# Patient Record
Sex: MALE | Race: WHITE
[De-identification: names, ages, dates, MRNs, and addresses within clinical notes are randomized per-mention and may not be internally consistent; named-entity substitution may affect disease eponyms.]

---

## 2017-03-03 ENCOUNTER — HOSPITAL ENCOUNTER (OUTPATIENT)
Dept: HOSPITAL 79 - KOH-I | Age: 67
End: 2017-03-03
Attending: PODIATRIST
Payer: MEDICARE

## 2017-03-03 DIAGNOSIS — M79.671: Primary | ICD-10-CM

## 2017-04-26 ENCOUNTER — OFFICE VISIT (OUTPATIENT)
Dept: UROLOGY | Facility: CLINIC | Age: 67
End: 2017-04-26

## 2017-04-26 DIAGNOSIS — N13.8 BPH (BENIGN PROSTATIC HYPERTROPHY) WITH URINARY OBSTRUCTION: ICD-10-CM

## 2017-04-26 DIAGNOSIS — E29.1 HYPOGONADISM IN MALE: Primary | ICD-10-CM

## 2017-04-26 DIAGNOSIS — N40.1 BPH (BENIGN PROSTATIC HYPERTROPHY) WITH URINARY OBSTRUCTION: ICD-10-CM

## 2017-04-26 LAB
BASOPHILS # BLD AUTO: 0.02 10*3/MM3 (ref 0–0.3)
BASOPHILS NFR BLD AUTO: 0.3 % (ref 0–2)
DEPRECATED RDW RBC AUTO: 41.7 FL (ref 37–54)
EOSINOPHIL # BLD AUTO: 0.22 10*3/MM3 (ref 0–0.7)
EOSINOPHIL NFR BLD AUTO: 3.1 % (ref 0–7)
ERYTHROCYTE [DISTWIDTH] IN BLOOD BY AUTOMATED COUNT: 13.1 % (ref 11.5–14.5)
HCT VFR BLD AUTO: 43.7 % (ref 42–52)
HGB BLD-MCNC: 14.6 G/DL (ref 14–18)
IMM GRANULOCYTES # BLD: 0.07 10*3/MM3 (ref 0–0.03)
IMM GRANULOCYTES NFR BLD: 1 % (ref 0–0.5)
LYMPHOCYTES # BLD AUTO: 2.75 10*3/MM3 (ref 1–3)
LYMPHOCYTES NFR BLD AUTO: 39.1 % (ref 16–46)
MCH RBC QN AUTO: 29.4 PG (ref 27–33)
MCHC RBC AUTO-ENTMCNC: 33.4 G/DL (ref 33–37)
MCV RBC AUTO: 87.9 FL (ref 80–94)
MONOCYTES # BLD AUTO: 0.83 10*3/MM3 (ref 0.1–0.9)
MONOCYTES NFR BLD AUTO: 11.8 % (ref 0–12)
NEUTROPHILS # BLD AUTO: 3.15 10*3/MM3 (ref 1.4–6.5)
NEUTROPHILS NFR BLD AUTO: 44.7 % (ref 40–75)
PLATELET # BLD AUTO: 194 10*3/MM3 (ref 130–400)
PMV BLD AUTO: 10.5 FL (ref 6–10)
PSA SERPL-MCNC: 1.38 NG/ML (ref 0–4)
RBC # BLD AUTO: 4.97 10*6/MM3 (ref 4.7–6.1)
TESTOST SERPL-MCNC: 333.35 NG/DL (ref 86.98–780.1)
WBC NRBC COR # BLD: 7.04 10*3/MM3 (ref 4.5–12.5)

## 2017-04-26 PROCEDURE — 36415 COLL VENOUS BLD VENIPUNCTURE: CPT | Performed by: UROLOGY

## 2017-04-26 PROCEDURE — 85025 COMPLETE CBC W/AUTO DIFF WBC: CPT | Performed by: UROLOGY

## 2017-04-26 PROCEDURE — 84153 ASSAY OF PSA TOTAL: CPT | Performed by: UROLOGY

## 2017-04-26 PROCEDURE — 96372 THER/PROPH/DIAG INJ SC/IM: CPT | Performed by: UROLOGY

## 2017-04-26 PROCEDURE — 84403 ASSAY OF TOTAL TESTOSTERONE: CPT | Performed by: UROLOGY

## 2017-04-26 PROCEDURE — 99203 OFFICE O/P NEW LOW 30 MIN: CPT | Performed by: UROLOGY

## 2017-04-26 RX ORDER — LISINOPRIL 40 MG/1
40 TABLET ORAL EVERY MORNING
COMMUNITY
Start: 2017-04-12 | End: 2022-02-11

## 2017-04-26 RX ORDER — TESTOSTERONE CYPIONATE 200 MG/ML
400 INJECTION, SOLUTION INTRAMUSCULAR ONCE
Status: COMPLETED | OUTPATIENT
Start: 2017-04-26 | End: 2017-04-26

## 2017-04-26 RX ORDER — GLIMEPIRIDE 4 MG/1
4 TABLET ORAL
COMMUNITY
Start: 2017-04-12

## 2017-04-26 RX ORDER — TIMOLOL MALEATE 5 MG/ML
1 SOLUTION/ DROPS OPHTHALMIC 2 TIMES DAILY
COMMUNITY
Start: 2017-04-08

## 2017-04-26 RX ADMIN — TESTOSTERONE CYPIONATE 400 MG: 200 INJECTION, SOLUTION INTRAMUSCULAR at 14:27

## 2017-04-26 NOTE — PROGRESS NOTES
Chief Complaint:          Chief Complaint   Patient presents with   • Erectile Dysfunction       HPI:   67 y.o. male.    67-year-old  white male to a much younger woman of 41 with significant erectile dysfunction for 2 years he's able to get a 50% erection and it maulik there is no been.  He has low libido and he feels as if his penis is drawn up inside him.  He is also signs and symptoms of hypogonadism.  I'm going to initiate a workup begun testosterone today after appropriate discussion of the risks and benefits and start him on oral sildenafil.This pleasant male patient presents today with signs and symptoms that are consistent with low testosterone he has positive Petr questionnaire by history.  He has a discussion of the various forms testosterone available including parenteral, topical, and the form of a patch.  We discussed the efficacy of the gels, and the injections.  As well as the cost and benefits analysis.  We discussed the the studies a talked about heart disease and its effect on prostate cancer both of which are negligible.  He gives verbal consent to proceed with treatment.  He understands the risks and benefits of length he also completed his attempts at fertility he understands the partial effect on spermatogenesis.ED-we discussed the anatomy and physiology of the penis and the endothelium.  We discussed the various treatment options available including oral medication and its various forms.  Penile injections, vacuum erection device and surgical intervention reserved for only the most severe cases.  We discussed the need for testosterone and about 20% of cases of erectile dysfunction      Past Medical History:        Past Medical History:   Diagnosis Date   • Diabetes mellitus    • Hypertension          Current Meds:     Current Outpatient Prescriptions   Medication Sig Dispense Refill   • glimepiride (AMARYL) 4 MG tablet      • lisinopril (PRINIVIL,ZESTRIL) 40 MG tablet      • metFORMIN  (GLUCOPHAGE) 500 MG tablet      • metFORMIN (GLUCOPHAGE) 850 MG tablet      • timolol (TIMOPTIC) 0.5 % ophthalmic solution        No current facility-administered medications for this visit.         Allergies:      No Known Allergies     Past Surgical History:     Past Surgical History:   Procedure Laterality Date   • CHOLECYSTECTOMY     • MOUTH SURGERY           Social History:     Social History     Social History   • Marital status:      Spouse name: N/A   • Number of children: N/A   • Years of education: N/A     Occupational History   • Not on file.     Social History Main Topics   • Smoking status: Former Smoker   • Smokeless tobacco: Not on file   • Alcohol use No   • Drug use: No   • Sexual activity: Not on file     Other Topics Concern   • Not on file     Social History Narrative   • No narrative on file       Family History:     Family History   Problem Relation Age of Onset   • No Known Problems Father    • No Known Problems Mother        Review of Systems:     Review of Systems   Constitutional: Negative.    HENT: Negative.    Eyes: Negative.    Respiratory: Negative.    Cardiovascular: Negative.    Gastrointestinal: Negative.    Endocrine: Negative.    Genitourinary: Negative.    Musculoskeletal: Negative.    Allergic/Immunologic: Negative.    Neurological: Negative.    Hematological: Negative.    Psychiatric/Behavioral: Negative.        Physical Exam:     Physical Exam   Constitutional: He is oriented to person, place, and time. He appears well-developed and well-nourished.   HENT:   Head: Normocephalic and atraumatic.   Eyes: Conjunctivae and EOM are normal. Pupils are equal, round, and reactive to light.   Neck: Normal range of motion.   Cardiovascular: Normal rate, regular rhythm, normal heart sounds and intact distal pulses.    Pulmonary/Chest: Effort normal and breath sounds normal.   Abdominal: Soft. Bowel sounds are normal.   Genitourinary: Rectum normal, prostate normal and penis normal.    Musculoskeletal: Normal range of motion.   Neurological: He is alert and oriented to person, place, and time. He has normal reflexes.   Skin: Skin is warm and dry.   Psychiatric: He has a normal mood and affect. His behavior is normal. Judgment and thought content normal.   Nursing note and vitals reviewed.      Procedure:       Assessment:   No diagnosis found.  No orders of the defined types were placed in this encounter.      Plan:   Erectile dysfunction-treatment therapy is an urgent need,ED-we discussed the anatomy and physiology of the penis and the endothelium.  We discussed the various treatment options available including oral medication and its various forms.  Penile injections, vacuum erection device and surgical intervention reserved for only the most severe cases.  We discussed the need for testosterone and about 20% of cases of erectile dysfunction.This pleasant male patient presents today with signs and symptoms that are consistent with low testosterone he has positive Petr questionnaire by history.  He has a discussion of the various forms testosterone available including parenteral, topical, and the form of a patch.  We discussed the efficacy of the gels, and the injections.  As well as the cost and benefits analysis.  We discussed the the studies a talked about heart disease and its effect on prostate cancer both of which are negligible.  He gives verbal consent to proceed with treatment.  He understands the risks and benefits of length he also completed his attempts at fertility he understands the partial effect on spermatogenesis           This document has been electronically signed by VIRAJ BAUTISTA MD April 26, 2017 1:58 PM

## 2017-10-19 ENCOUNTER — OFFICE VISIT (OUTPATIENT)
Dept: UROLOGY | Facility: CLINIC | Age: 67
End: 2017-10-19

## 2017-10-19 VITALS
DIASTOLIC BLOOD PRESSURE: 81 MMHG | BODY MASS INDEX: 30.8 KG/M2 | HEIGHT: 71 IN | WEIGHT: 220 LBS | HEART RATE: 68 BPM | SYSTOLIC BLOOD PRESSURE: 138 MMHG

## 2017-10-19 DIAGNOSIS — R79.89 LOW TESTOSTERONE IN MALE: Primary | ICD-10-CM

## 2017-10-19 DIAGNOSIS — N52.02 CORPORO-VENOUS OCCLUSIVE ERECTILE DYSFUNCTION: ICD-10-CM

## 2017-10-19 DIAGNOSIS — E29.1 HYPOGONADISM IN MALE: ICD-10-CM

## 2017-10-19 PROCEDURE — 96372 THER/PROPH/DIAG INJ SC/IM: CPT | Performed by: UROLOGY

## 2017-10-19 PROCEDURE — 99213 OFFICE O/P EST LOW 20 MIN: CPT | Performed by: UROLOGY

## 2017-10-19 RX ORDER — SILDENAFIL CITRATE 20 MG/1
TABLET ORAL
Qty: 24 TABLET | Refills: 11 | Status: SHIPPED | OUTPATIENT
Start: 2017-10-19

## 2017-10-19 RX ORDER — TESTOSTERONE CYPIONATE 200 MG/ML
100 INJECTION, SOLUTION INTRAMUSCULAR ONCE
Status: COMPLETED | OUTPATIENT
Start: 2017-10-19 | End: 2017-10-19

## 2017-10-19 RX ORDER — TESTOSTERONE CYPIONATE 200 MG/ML
INJECTION, SOLUTION INTRAMUSCULAR
Qty: 10 ML | Refills: 2 | Status: SHIPPED | OUTPATIENT
Start: 2017-10-19 | End: 2018-05-04 | Stop reason: HOSPADM

## 2017-10-19 RX ADMIN — TESTOSTERONE CYPIONATE 100 MG: 200 INJECTION, SOLUTION INTRAMUSCULAR at 09:32

## 2017-10-19 NOTE — PROGRESS NOTES
Chief Complaint:          Chief Complaint   Patient presents with   • Erectile Dysfunction       HPI:   67 y.o. male.  67-year-old  white male to a much younger woman of 41 with significant erectile dysfunction for 2 years he's able to get a 50% erection and it maulik there is no been.  He has low libido and he feels as if his penis is drawn up inside him.  He is also signs and symptoms of hypogonadism.  I'm going to initiate a workup begun testosterone today after appropriate discussion of the risks and benefits and start him on oral sildenafil.This pleasant male patient presents today with signs and symptoms that are consistent with low testosterone he has positive Petr questionnaire by history.  He has a discussion of the various forms testosterone available including parenteral, topical, and the form of a patch.  We discussed the efficacy of the gels, and the injections.  As well as the cost and benefits analysis.  We discussed the the studies a talked about heart disease and its effect on prostate cancer both of which are negligible.  He gives verbal consent to proceed with treatment.  He understands the risks and benefits of length he also completed his attempts at fertility he understands the partial effect on spermatogenesis.ED-we discussed the anatomy and physiology of the penis and the endothelium.  We discussed the various treatment options available including oral medication and its various forms.  Penile injections, vacuum erection device and surgical intervention reserved for only the most severe cases.  We discussed the need for testosterone and about 20% of cases of erectile dysfunction      He returns today his penis is still not rock hard.  His PSA is 1.38 with a testosterone of 333 and a hemoglobin of 14.6 I'm undergo ahead make the addition of sildenafil.  I discussed this with him at length he also discussed penile injections and a vacuum erection device to the age of his wife organ to  recommend the going to testosterone twice a week and adding the Viagra. Erectile dysfunction-we discussed the anatomy and physiology of the penis and the endothelium.  We discussed the various forms of erectile dysfunction including peripheral venous occlusive disease, postoperative, secondary to radiation treatments of the prostate, and arterial inflow.  We discussed the various treatment options available including oral medication and its various forms.  We discussed the use of both generic and non-generic Viagra.  We discussed Cialis and a longer half-life of 17 hours as well as the other 2 medications.  We discussed cost involved with this including the fact that the generic is much cheaper but is taken has multiple pills because they are 20 mg dosages.  We did discuss the other alternatives including Penile injections, vacuum erection devices and surgical intervention reserved for only the most severe cases.  We discussed the need for testosterone in about 20% of cases of erectile dysfunction.        Past Medical History:        Past Medical History:   Diagnosis Date   • Diabetes mellitus    • Hypertension          Current Meds:     Current Outpatient Prescriptions   Medication Sig Dispense Refill   • glimepiride (AMARYL) 4 MG tablet      • lisinopril (PRINIVIL,ZESTRIL) 40 MG tablet      • metFORMIN (GLUCOPHAGE) 500 MG tablet      • metFORMIN (GLUCOPHAGE) 850 MG tablet      • timolol (TIMOPTIC) 0.5 % ophthalmic solution        No current facility-administered medications for this visit.         Allergies:      No Known Allergies     Past Surgical History:     Past Surgical History:   Procedure Laterality Date   • CHOLECYSTECTOMY     • MOUTH SURGERY           Social History:     Social History     Social History   • Marital status:      Spouse name: N/A   • Number of children: N/A   • Years of education: N/A     Occupational History   • Not on file.     Social History Main Topics   • Smoking status:  Former Smoker   • Smokeless tobacco: Not on file   • Alcohol use No   • Drug use: No   • Sexual activity: Not on file     Other Topics Concern   • Not on file     Social History Narrative       Family History:     Family History   Problem Relation Age of Onset   • No Known Problems Father    • No Known Problems Mother        Review of Systems:     Review of Systems   Constitutional: Negative.    HENT: Negative.    Eyes: Negative.    Respiratory: Negative.    Cardiovascular: Negative.    Gastrointestinal: Negative.    Endocrine: Negative.    Musculoskeletal: Negative.    Allergic/Immunologic: Negative.    Neurological: Negative.    Hematological: Negative.    Psychiatric/Behavioral: Negative.        Physical Exam:     Physical Exam   Constitutional: He is oriented to person, place, and time. He appears well-developed and well-nourished.   HENT:   Head: Normocephalic and atraumatic.   Eyes: Conjunctivae and EOM are normal. Pupils are equal, round, and reactive to light.   Neck: Normal range of motion.   Cardiovascular: Normal rate, regular rhythm, normal heart sounds and intact distal pulses.    Pulmonary/Chest: Effort normal and breath sounds normal.   Abdominal: Soft. Bowel sounds are normal.   Genitourinary: Rectum normal, prostate normal and penis normal.   Musculoskeletal: Normal range of motion.   Neurological: He is alert and oriented to person, place, and time. He has normal reflexes.   Skin: Skin is warm and dry.   Psychiatric: He has a normal mood and affect. His behavior is normal. Judgment and thought content normal.   Nursing note and vitals reviewed.      Procedure:       Assessment:   No diagnosis found.  No orders of the defined types were placed in this encounter.      Plan:   -Low testosterone: patient is here for follow-up.  Since beginning the medication he's been very pleased.  He reports a dramatic improvement in his erections, ability to achieve and maintain an erection, improvement in libido,  increase in frequency of morning erections, a noticeable weight loss consistent with the treatment.  No development of breast problems or abnormalities.  He's going to have appropriate safety laboratory parameters checked.   He understands that the new data implicates testosterone with the development of prostate cancer and this is all but been disproven and the medical literature as well as the risks of cardiovascular disease which is actually also been disproven.  He understands that while he is a candidate for topical therapy if he is in contact with children this is not an option because it's been shown to accentuate genitalia development at an early age that this frequently irreversible.  He also understands this is a controlled substance and as such will not be prescribed without appropriate follow-up and appropriate laboratory investigation.  He understands effects on spermatogenesis including the fact that this is not always completely reversible and not always completely limited his ability to father a child.  He has demonstrated facility in the technique of both intramuscular and subcutaneous injection.  And has been taught sterility one drawing up the medication.  Erectile dysfunction-we discussed the anatomy and physiology of the penis and the endothelium.  We discussed the various forms of erectile dysfunction including peripheral venous occlusive disease, postoperative, secondary to radiation treatments of the prostate, and arterial inflow.  We discussed the various treatment options available including oral medication and its various forms.  We discussed the use of both generic and non-generic Viagra.  We discussed Cialis and a longer half-life of 17 hours as well as the other 2 medications.  We discussed cost involved with this including the fact that the generic is much cheaper but is taken has multiple pills because they are 20 mg dosages.  We did discuss the other alternatives including Penile  injections, vacuum erection devices and surgical intervention reserved for only the most severe cases.  We discussed the need for testosterone in about 20% of cases of erectile dysfunction.           This document has been electronically signed by VIRAJ BAUTISTA MD October 19, 2017 8:41 AM

## 2017-10-20 PROBLEM — N52.02 CORPORO-VENOUS OCCLUSIVE ERECTILE DYSFUNCTION: Status: ACTIVE | Noted: 2017-10-20

## 2018-03-01 ENCOUNTER — TRANSCRIBE ORDERS (OUTPATIENT)
Dept: ADMINISTRATIVE | Facility: HOSPITAL | Age: 68
End: 2018-03-01

## 2018-03-01 DIAGNOSIS — I70.0 CALCIFICATION OF AORTA (HCC): Primary | ICD-10-CM

## 2018-03-07 ENCOUNTER — TRANSCRIBE ORDERS (OUTPATIENT)
Dept: ADMINISTRATIVE | Facility: HOSPITAL | Age: 68
End: 2018-03-07

## 2018-03-07 ENCOUNTER — APPOINTMENT (OUTPATIENT)
Dept: LAB | Facility: HOSPITAL | Age: 68
End: 2018-03-07
Attending: INTERNAL MEDICINE

## 2018-03-07 DIAGNOSIS — Z78.9 AMERICAN DIABETES ASSOCIATION (ADA) 1100 CALORIE DIET: Primary | ICD-10-CM

## 2018-03-07 LAB
BUN BLD-MCNC: 16 MG/DL (ref 7–21)
CREAT BLD-MCNC: 1.02 MG/DL (ref 0.43–1.29)
GFR SERPL CREATININE-BSD FRML MDRD: 73 ML/MIN/1.73

## 2018-03-07 PROCEDURE — 82565 ASSAY OF CREATININE: CPT | Performed by: INTERNAL MEDICINE

## 2018-03-07 PROCEDURE — 84520 ASSAY OF UREA NITROGEN: CPT | Performed by: INTERNAL MEDICINE

## 2018-03-07 PROCEDURE — 36415 COLL VENOUS BLD VENIPUNCTURE: CPT | Performed by: INTERNAL MEDICINE

## 2018-03-08 ENCOUNTER — HOSPITAL ENCOUNTER (OUTPATIENT)
Dept: CT IMAGING | Facility: HOSPITAL | Age: 68
Discharge: HOME | End: 2018-03-08
Attending: INTERNAL MEDICINE | Admitting: INTERNAL MEDICINE

## 2018-03-08 DIAGNOSIS — I70.0 CALCIFICATION OF AORTA (HCC): ICD-10-CM

## 2018-03-08 PROCEDURE — 71275 CT ANGIOGRAPHY CHEST: CPT | Performed by: RADIOLOGY

## 2018-03-08 PROCEDURE — 71275 CT ANGIOGRAPHY CHEST: CPT

## 2018-03-08 PROCEDURE — 0 IOPAMIDOL 61 % SOLUTION: Performed by: INTERNAL MEDICINE

## 2018-03-08 RX ADMIN — IOPAMIDOL 100 ML: 612 INJECTION, SOLUTION INTRAVENOUS at 10:30

## 2018-05-01 DIAGNOSIS — R94.39 ABNORMAL CARDIOVASCULAR STRESS TEST: Primary | ICD-10-CM

## 2018-05-01 NOTE — PROGRESS NOTES
Per telephone call with Dr. Cecy Gorman pt needs cath. Diabetic with atypical symptoms however stress was positive at 2 minutes with ST depression (2 mm). Call [phone] to schedule for Wednesday or Friday

## 2018-05-03 ENCOUNTER — PREP FOR SURGERY (OUTPATIENT)
Dept: OTHER | Facility: HOSPITAL | Age: 68
End: 2018-05-03

## 2018-05-03 RX ORDER — CLOPIDOGREL BISULFATE 75 MG/1
75 TABLET ORAL DAILY
Status: CANCELLED | OUTPATIENT
Start: 2018-05-04

## 2018-05-03 RX ORDER — SODIUM CHLORIDE 0.9 % (FLUSH) 0.9 %
1-10 SYRINGE (ML) INJECTION AS NEEDED
Status: CANCELLED | OUTPATIENT
Start: 2018-05-03

## 2018-05-03 RX ORDER — CLOPIDOGREL BISULFATE 75 MG/1
600 TABLET ORAL ONCE
Status: CANCELLED | OUTPATIENT
Start: 2018-05-03 | End: 2018-05-03

## 2018-05-03 RX ORDER — ACETAMINOPHEN 325 MG/1
650 TABLET ORAL EVERY 4 HOURS PRN
Status: CANCELLED | OUTPATIENT
Start: 2018-05-03

## 2018-05-03 RX ORDER — NITROGLYCERIN 0.4 MG/1
0.4 TABLET SUBLINGUAL
Status: CANCELLED | OUTPATIENT
Start: 2018-05-03

## 2018-05-04 ENCOUNTER — APPOINTMENT (OUTPATIENT)
Dept: GENERAL RADIOLOGY | Facility: HOSPITAL | Age: 68
End: 2018-05-04

## 2018-05-04 ENCOUNTER — HOSPITAL ENCOUNTER (OUTPATIENT)
Facility: HOSPITAL | Age: 68
Discharge: HOME | End: 2018-05-04
Attending: INTERNAL MEDICINE | Admitting: INTERNAL MEDICINE

## 2018-05-04 VITALS
WEIGHT: 214.73 LBS | RESPIRATION RATE: 18 BRPM | HEART RATE: 60 BPM | DIASTOLIC BLOOD PRESSURE: 75 MMHG | HEIGHT: 71 IN | BODY MASS INDEX: 30.06 KG/M2 | SYSTOLIC BLOOD PRESSURE: 124 MMHG | OXYGEN SATURATION: 97 %

## 2018-05-04 DIAGNOSIS — R94.39 ABNORMAL CARDIOVASCULAR STRESS TEST: ICD-10-CM

## 2018-05-04 LAB
ALBUMIN SERPL-MCNC: 4.1 G/DL (ref 3.2–4.8)
ALBUMIN/GLOB SERPL: 1.4 G/DL (ref 1.5–2.5)
ALP SERPL-CCNC: 73 U/L (ref 25–100)
ALT SERPL W P-5'-P-CCNC: 28 U/L (ref 7–40)
ANION GAP SERPL CALCULATED.3IONS-SCNC: 6 MMOL/L (ref 3–11)
ARTICHOKE IGE QN: 87 MG/DL (ref 0–130)
AST SERPL-CCNC: 22 U/L (ref 0–33)
BILIRUB SERPL-MCNC: 0.7 MG/DL (ref 0.3–1.2)
BUN BLD-MCNC: 16 MG/DL (ref 9–23)
BUN/CREAT SERPL: 16 (ref 7–25)
CALCIUM SPEC-SCNC: 9.2 MG/DL (ref 8.7–10.4)
CHLORIDE SERPL-SCNC: 106 MMOL/L (ref 99–109)
CHOLEST SERPL-MCNC: 126 MG/DL (ref 0–200)
CO2 SERPL-SCNC: 25 MMOL/L (ref 20–31)
CREAT BLD-MCNC: 1 MG/DL (ref 0.6–1.3)
DEPRECATED RDW RBC AUTO: 40.1 FL (ref 37–54)
ERYTHROCYTE [DISTWIDTH] IN BLOOD BY AUTOMATED COUNT: 12.6 % (ref 11.3–14.5)
GFR SERPL CREATININE-BSD FRML MDRD: 75 ML/MIN/1.73
GLOBULIN UR ELPH-MCNC: 2.9 GM/DL
GLUCOSE BLD-MCNC: 150 MG/DL (ref 70–100)
GLUCOSE BLDC GLUCOMTR-MCNC: 114 MG/DL (ref 70–130)
HBA1C MFR BLD: 8.4 % (ref 4.8–5.6)
HCT VFR BLD AUTO: 41.6 % (ref 38.9–50.9)
HDLC SERPL-MCNC: 29 MG/DL (ref 40–60)
HGB BLD-MCNC: 14.2 G/DL (ref 13.1–17.5)
MCH RBC QN AUTO: 29.6 PG (ref 27–31)
MCHC RBC AUTO-ENTMCNC: 34.1 G/DL (ref 32–36)
MCV RBC AUTO: 86.7 FL (ref 80–99)
PLATELET # BLD AUTO: 174 10*3/MM3 (ref 150–450)
PMV BLD AUTO: 9.7 FL (ref 6–12)
POTASSIUM BLD-SCNC: 4.3 MMOL/L (ref 3.5–5.5)
PROT SERPL-MCNC: 7 G/DL (ref 5.7–8.2)
RBC # BLD AUTO: 4.8 10*6/MM3 (ref 4.2–5.76)
SODIUM BLD-SCNC: 137 MMOL/L (ref 132–146)
TRIGL SERPL-MCNC: 147 MG/DL (ref 0–150)
WBC NRBC COR # BLD: 6.86 10*3/MM3 (ref 3.5–10.8)

## 2018-05-04 PROCEDURE — S0260 H&P FOR SURGERY: HCPCS | Performed by: INTERNAL MEDICINE

## 2018-05-04 PROCEDURE — 25010000002 MIDAZOLAM PER 1 MG: Performed by: INTERNAL MEDICINE

## 2018-05-04 PROCEDURE — 36415 COLL VENOUS BLD VENIPUNCTURE: CPT

## 2018-05-04 PROCEDURE — 0 IOPAMIDOL PER 1 ML: Performed by: INTERNAL MEDICINE

## 2018-05-04 PROCEDURE — C1769 GUIDE WIRE: HCPCS | Performed by: INTERNAL MEDICINE

## 2018-05-04 PROCEDURE — 83036 HEMOGLOBIN GLYCOSYLATED A1C: CPT | Performed by: PHYSICIAN ASSISTANT

## 2018-05-04 PROCEDURE — C1894 INTRO/SHEATH, NON-LASER: HCPCS | Performed by: INTERNAL MEDICINE

## 2018-05-04 PROCEDURE — G0108 DIAB MANAGE TRN  PER INDIV: HCPCS | Performed by: REGISTERED NURSE

## 2018-05-04 PROCEDURE — 80053 COMPREHEN METABOLIC PANEL: CPT | Performed by: PHYSICIAN ASSISTANT

## 2018-05-04 PROCEDURE — 85027 COMPLETE CBC AUTOMATED: CPT | Performed by: PHYSICIAN ASSISTANT

## 2018-05-04 PROCEDURE — 25010000002 FENTANYL CITRATE (PF) 100 MCG/2ML SOLUTION: Performed by: INTERNAL MEDICINE

## 2018-05-04 PROCEDURE — 93005 ELECTROCARDIOGRAM TRACING: CPT | Performed by: PHYSICIAN ASSISTANT

## 2018-05-04 PROCEDURE — 82962 GLUCOSE BLOOD TEST: CPT

## 2018-05-04 PROCEDURE — 93458 L HRT ARTERY/VENTRICLE ANGIO: CPT | Performed by: INTERNAL MEDICINE

## 2018-05-04 PROCEDURE — C1760 CLOSURE DEV, VASC: HCPCS | Performed by: INTERNAL MEDICINE

## 2018-05-04 PROCEDURE — 71046 X-RAY EXAM CHEST 2 VIEWS: CPT

## 2018-05-04 PROCEDURE — 80061 LIPID PANEL: CPT | Performed by: PHYSICIAN ASSISTANT

## 2018-05-04 RX ORDER — MIDAZOLAM HYDROCHLORIDE 1 MG/ML
INJECTION INTRAMUSCULAR; INTRAVENOUS AS NEEDED
Status: DISCONTINUED | OUTPATIENT
Start: 2018-05-04 | End: 2018-05-04 | Stop reason: HOSPADM

## 2018-05-04 RX ORDER — ASPIRIN 81 MG/1
81 TABLET ORAL EVERY MORNING
COMMUNITY

## 2018-05-04 RX ORDER — NITROGLYCERIN 0.4 MG/1
0.4 TABLET SUBLINGUAL
Status: DISCONTINUED | OUTPATIENT
Start: 2018-05-04 | End: 2018-05-04 | Stop reason: HOSPADM

## 2018-05-04 RX ORDER — LIDOCAINE HYDROCHLORIDE 10 MG/ML
INJECTION, SOLUTION EPIDURAL; INFILTRATION; INTRACAUDAL; PERINEURAL AS NEEDED
Status: DISCONTINUED | OUTPATIENT
Start: 2018-05-04 | End: 2018-05-04 | Stop reason: HOSPADM

## 2018-05-04 RX ORDER — CLOPIDOGREL BISULFATE 75 MG/1
75 TABLET ORAL DAILY
Status: DISCONTINUED | OUTPATIENT
Start: 2018-05-05 | End: 2018-05-04 | Stop reason: HOSPADM

## 2018-05-04 RX ORDER — ACETAMINOPHEN 325 MG/1
650 TABLET ORAL EVERY 4 HOURS PRN
Status: DISCONTINUED | OUTPATIENT
Start: 2018-05-04 | End: 2018-05-04 | Stop reason: HOSPADM

## 2018-05-04 RX ORDER — FENTANYL CITRATE 50 UG/ML
INJECTION, SOLUTION INTRAMUSCULAR; INTRAVENOUS AS NEEDED
Status: DISCONTINUED | OUTPATIENT
Start: 2018-05-04 | End: 2018-05-04 | Stop reason: HOSPADM

## 2018-05-04 RX ORDER — ASPIRIN 81 MG/1
324 TABLET, CHEWABLE ORAL ONCE
Status: COMPLETED | OUTPATIENT
Start: 2018-05-04 | End: 2018-05-04

## 2018-05-04 RX ORDER — ATORVASTATIN CALCIUM 10 MG/1
10 TABLET, FILM COATED ORAL NIGHTLY
COMMUNITY

## 2018-05-04 RX ORDER — SODIUM CHLORIDE 0.9 % (FLUSH) 0.9 %
1-10 SYRINGE (ML) INJECTION AS NEEDED
Status: DISCONTINUED | OUTPATIENT
Start: 2018-05-04 | End: 2018-05-04 | Stop reason: HOSPADM

## 2018-05-04 RX ORDER — CLOPIDOGREL BISULFATE 75 MG/1
600 TABLET ORAL ONCE
Status: COMPLETED | OUTPATIENT
Start: 2018-05-04 | End: 2018-05-04

## 2018-05-04 RX ADMIN — ASPIRIN 81 MG 324 MG: 81 TABLET ORAL at 11:15

## 2018-05-04 RX ADMIN — CLOPIDOGREL BISULFATE 600 MG: 75 TABLET ORAL at 11:15

## 2018-05-04 NOTE — CONSULTS
Patient was seen for  diagnosis of type 2 diabetes. Discussed and taught patient about type 2 diabetes self-management, risk factors, and importance of blood glucose control to reduce complications. Target blood glucose readings and A1c goals per ADA were reviewed. Reviewed with patient current A1c, of 8.4% and discussed its significance. Signs, symptoms and treatment of hyperglycemia and hypoglycemia were discussed. Lifestyle changes such as physical activity with MD approval and healthy eating were encouraged.

## 2018-05-04 NOTE — H&P
Pre-cardiac Catheterization History and Physical  [facility] at [facility]      Patient:  Hector Johnston Jr.  :  1950  MRN: [id number]    PCP:  EDMOND Sarmiento  PHONE:  [phone]    DATE: 2018  ID: Hector Johnston Jr. is a 68 y.o. male resident of [place], [place]     CC: abnormal stress test    PROBLEM LIST:   1. Coronary artery disease  A. CTA Chest 3/2018: coronary artery calcification   B. GXT stress only 2018: positive for stress induced ischemia, with 1mm horizontal ST depression in lateral leads with stress  C.Stopped GXT at TWO MINUTES due to extreme fatigue.  C. Echo 3/12/2018: Normal LVSF, mild LVH, mild AI   2. Hypertension  3. Hyperlipidemia  4. DM2  5. Obesity  6. History of Bell's Palsy  7. Surgical history:   A. Bilateral cataracts   B. Cholecystectomy   C. Dental surgery     BRIEF HPI: Mr. Johnston is a pleasant 68 y.o obese WM with history of diabetes, HTN and HLD who presents for cardiac catheterization today. He underwent a CTA of his chest earlier this year and was noted to have coronary calcification on this test. He subsequently had a GXT stress only with Dr. Gorman which was positive for ischemia however patient was asymptomatic. He denies angina, exertional dyspnea, palpitations or heart failure symptoms. He denies syncope. No history of MI, CVA, DVT/PE or rheumatic fever. He was just started on Lipitor following his stress test results. His A1C has been as high as 9-10% in the past. He works in a warehouse and denies tobacco, alcohol or drug use.     Cardiac Risk Factors: advanced age (older than 55 for men, 65 for women), diabetes mellitus, dyslipidemia, hypertension, male gender and obesity (BMI >= 30 kg/m2)    Allergies:      No Known Allergies    MEDICATIONS:  · ASA 81mg daily  · Atorvastatin 10mg nightly  · Lisinopril 40mg daily  · Metformin 850mg BID  Glimepiride 4mg daily  · Sildenafil PRN  · Timolol eye drops     Past medical & surgical  "history, social and family history reviewed in the electronic medical record.    ROS: Pertinent positives listed in the HPI and problem list above. All others reviewed and negative.     Physical Exam:   /83 (BP Location: Left arm, Patient Position: Lying)   Pulse 73   Resp 16   Ht 180.3 cm (71\")   Wt 97.4 kg (214 lb 11.7 oz)   SpO2 96%   BMI 29.95 kg/m²     Constitutional:    Alert, cooperative, in no acute distress   Neck:     No Jugular venous distention, adenopathy, or thyromegaly noted.     Heart:    Regular rhythm and normal rate, normal S1 and S2, no murmurs,gallops, rubs, or clicks. No distinct PMI noted.    Lungs:     Clear to auscultation bilaterally, respirations regular, even     and unlabored    Abdomen:     Soft non-tender, non-distended, normal bowel sounds, no masses or organomegaly   Extremities:   No gross deformities, no edema, clubbing, or cyanosis.    Pulses:   Peripheral pulses palpable and equal bilaterally.     Barbaeu Test:  Left: Normal  (oxymetric Allens) Right: Not Assessed     Labs and Diagnostic Data:    Results from last 7 days  Lab Units 05/04/18  1010   SODIUM mmol/L 137   POTASSIUM mmol/L 4.3   CHLORIDE mmol/L 106   CO2 mmol/L 25.0   BUN mg/dL 16   CREATININE mg/dL 1.00   GLUCOSE mg/dL 150*   CALCIUM mg/dL 9.2       Results from last 7 days  Lab Units 05/04/18  1010   WBC 10*3/mm3 6.86   HEMOGLOBIN g/dL 14.2   HEMATOCRIT % 41.6   PLATELETS 10*3/mm3 174     Lab Results   Component Value Date    CHOL 126 05/04/2018    TRIG 147 05/04/2018    HDL 29 (L) 05/04/2018    LDL 87 05/04/2018    AST 22 05/04/2018    ALT 28 05/04/2018       Results from last 7 days  Lab Units 05/04/18  1010   HEMOGLOBIN A1C % 8.40*               EKG: pending     IMPRESSION:  · 69 y/o WM with history of HTN, HLD, DM2, and obesity with recently abnormal GXT stress only for lateral ischemia following a CTA of his chest which demonstrated coronary calcification. THIS WAS A VERY HIGH RISK TEST due to early " ST depression and very limited exercise duration.  He presents for cardiac catheterization with intervention standby.     PLAN:  · Procedure to perform: LHC +/- CBI. Risks, benefits and alternatives to the procedure explained to the patient and he understands and wishes to proceed.     I, Jose Dickerson MD, personally performed the services described as documented by the above named individual. I have made any necessary edits and it is both accurate and complete 5/4/2018  5:51 PM    Scribed for Jose Dickerson MD by Patrica Wallace PA-C. 5/4/2018  11:34 AM

## 2018-05-07 ENCOUNTER — DOCUMENTATION (OUTPATIENT)
Dept: CARDIAC REHAB | Facility: HOSPITAL | Age: 68
End: 2018-05-07

## 2018-08-04 ENCOUNTER — APPOINTMENT (OUTPATIENT)
Dept: GENERAL RADIOLOGY | Facility: HOSPITAL | Age: 68
End: 2018-08-04

## 2018-08-04 ENCOUNTER — APPOINTMENT (OUTPATIENT)
Dept: CT IMAGING | Facility: HOSPITAL | Age: 68
End: 2018-08-04

## 2018-08-04 ENCOUNTER — HOSPITAL ENCOUNTER (EMERGENCY)
Facility: HOSPITAL | Age: 68
Discharge: ANOTHER HOSPITAL | End: 2018-08-04
Attending: EMERGENCY MEDICINE | Admitting: EMERGENCY MEDICINE

## 2018-08-04 VITALS
DIASTOLIC BLOOD PRESSURE: 89 MMHG | OXYGEN SATURATION: 97 % | WEIGHT: 220 LBS | HEART RATE: 82 BPM | TEMPERATURE: 98.5 F | RESPIRATION RATE: 18 BRPM | BODY MASS INDEX: 30.8 KG/M2 | SYSTOLIC BLOOD PRESSURE: 140 MMHG | HEIGHT: 71 IN
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
## 2018-08-04 DIAGNOSIS — S39.91XA ABDOMINAL TRAUMA, INITIAL ENCOUNTER: Primary | ICD-10-CM

## 2018-08-04 DIAGNOSIS — R74.8 ELEVATED PANCREATIC ENZYME: ICD-10-CM

## 2018-08-04 LAB
ALBUMIN SERPL-MCNC: 4 G/DL (ref 3.4–4.8)
ALBUMIN/GLOB SERPL: 1.4 G/DL (ref 1.5–2.5)
ALP SERPL-CCNC: 79 U/L (ref 40–129)
ALT SERPL W P-5'-P-CCNC: 26 U/L (ref 10–44)
AMYLASE SERPL-CCNC: 174 U/L (ref 28–100)
ANION GAP SERPL CALCULATED.3IONS-SCNC: 9.1 MMOL/L (ref 3.6–11.2)
AST SERPL-CCNC: 24 U/L (ref 10–34)
BASOPHILS # BLD AUTO: 0.02 10*3/MM3 (ref 0–0.3)
BASOPHILS NFR BLD AUTO: 0.3 % (ref 0–2)
BILIRUB SERPL-MCNC: 0.4 MG/DL (ref 0.2–1.8)
BUN BLD-MCNC: 15 MG/DL (ref 7–21)
BUN/CREAT SERPL: 11.5 (ref 7–25)
CALCIUM SPEC-SCNC: 9.1 MG/DL (ref 7.7–10)
CHLORIDE SERPL-SCNC: 104 MMOL/L (ref 99–112)
CO2 SERPL-SCNC: 25.9 MMOL/L (ref 24.3–31.9)
CREAT BLD-MCNC: 1.31 MG/DL (ref 0.43–1.29)
DEPRECATED RDW RBC AUTO: 40.4 FL (ref 37–54)
EOSINOPHIL # BLD AUTO: 0.2 10*3/MM3 (ref 0–0.7)
EOSINOPHIL NFR BLD AUTO: 2.7 % (ref 0–7)
ERYTHROCYTE [DISTWIDTH] IN BLOOD BY AUTOMATED COUNT: 12.8 % (ref 11.5–14.5)
GFR SERPL CREATININE-BSD FRML MDRD: 55 ML/MIN/1.73
GLOBULIN UR ELPH-MCNC: 2.9 GM/DL
GLUCOSE BLD-MCNC: 296 MG/DL (ref 70–110)
HCT VFR BLD AUTO: 40.2 % (ref 42–52)
HCT VFR BLD AUTO: 40.8 % (ref 42–52)
HGB BLD-MCNC: 13.7 G/DL (ref 14–18)
HGB BLD-MCNC: 14.3 G/DL (ref 14–18)
IMM GRANULOCYTES # BLD: 0.05 10*3/MM3 (ref 0–0.03)
IMM GRANULOCYTES NFR BLD: 0.7 % (ref 0–0.5)
LIPASE SERPL-CCNC: 245 U/L (ref 13–60)
LYMPHOCYTES # BLD AUTO: 2.45 10*3/MM3 (ref 1–3)
LYMPHOCYTES NFR BLD AUTO: 33.1 % (ref 16–46)
MCH RBC QN AUTO: 30.8 PG (ref 27–33)
MCHC RBC AUTO-ENTMCNC: 35 G/DL (ref 33–37)
MCV RBC AUTO: 87.9 FL (ref 80–94)
MONOCYTES # BLD AUTO: 0.66 10*3/MM3 (ref 0.1–0.9)
MONOCYTES NFR BLD AUTO: 8.9 % (ref 0–12)
NEUTROPHILS # BLD AUTO: 4.03 10*3/MM3 (ref 1.4–6.5)
NEUTROPHILS NFR BLD AUTO: 54.3 % (ref 40–75)
OSMOLALITY SERPL CALC.SUM OF ELEC: 289.3 MOSM/KG (ref 273–305)
PLATELET # BLD AUTO: 186 10*3/MM3 (ref 130–400)
PMV BLD AUTO: 10.1 FL (ref 6–10)
POTASSIUM BLD-SCNC: 4.9 MMOL/L (ref 3.5–5.3)
PROT SERPL-MCNC: 6.9 G/DL (ref 6–8)
RBC # BLD AUTO: 4.64 10*6/MM3 (ref 4.7–6.1)
SODIUM BLD-SCNC: 139 MMOL/L (ref 135–153)
TROPONIN I SERPL-MCNC: <0.006 NG/ML
WBC NRBC COR # BLD: 7.41 10*3/MM3 (ref 4.5–12.5)

## 2018-08-04 PROCEDURE — 96374 THER/PROPH/DIAG INJ IV PUSH: CPT

## 2018-08-04 PROCEDURE — 71275 CT ANGIOGRAPHY CHEST: CPT

## 2018-08-04 PROCEDURE — 85025 COMPLETE CBC W/AUTO DIFF WBC: CPT | Performed by: PHYSICIAN ASSISTANT

## 2018-08-04 PROCEDURE — 73140 X-RAY EXAM OF FINGER(S): CPT | Performed by: RADIOLOGY

## 2018-08-04 PROCEDURE — 72128 CT CHEST SPINE W/O DYE: CPT | Performed by: RADIOLOGY

## 2018-08-04 PROCEDURE — 25010000002 MORPHINE PER 10 MG: Performed by: EMERGENCY MEDICINE

## 2018-08-04 PROCEDURE — 80053 COMPREHEN METABOLIC PANEL: CPT | Performed by: PHYSICIAN ASSISTANT

## 2018-08-04 PROCEDURE — 0 IOPAMIDOL PER 1 ML: Performed by: EMERGENCY MEDICINE

## 2018-08-04 PROCEDURE — 36415 COLL VENOUS BLD VENIPUNCTURE: CPT

## 2018-08-04 PROCEDURE — 72128 CT CHEST SPINE W/O DYE: CPT

## 2018-08-04 PROCEDURE — 99285 EMERGENCY DEPT VISIT HI MDM: CPT

## 2018-08-04 PROCEDURE — 85014 HEMATOCRIT: CPT | Performed by: PHYSICIAN ASSISTANT

## 2018-08-04 PROCEDURE — 72131 CT LUMBAR SPINE W/O DYE: CPT | Performed by: RADIOLOGY

## 2018-08-04 PROCEDURE — 74177 CT ABD & PELVIS W/CONTRAST: CPT | Performed by: RADIOLOGY

## 2018-08-04 PROCEDURE — 73030 X-RAY EXAM OF SHOULDER: CPT | Performed by: RADIOLOGY

## 2018-08-04 PROCEDURE — 96361 HYDRATE IV INFUSION ADD-ON: CPT

## 2018-08-04 PROCEDURE — 72125 CT NECK SPINE W/O DYE: CPT | Performed by: RADIOLOGY

## 2018-08-04 PROCEDURE — 72131 CT LUMBAR SPINE W/O DYE: CPT

## 2018-08-04 PROCEDURE — 84484 ASSAY OF TROPONIN QUANT: CPT | Performed by: PHYSICIAN ASSISTANT

## 2018-08-04 PROCEDURE — 74177 CT ABD & PELVIS W/CONTRAST: CPT

## 2018-08-04 PROCEDURE — 70450 CT HEAD/BRAIN W/O DYE: CPT

## 2018-08-04 PROCEDURE — 96376 TX/PRO/DX INJ SAME DRUG ADON: CPT

## 2018-08-04 PROCEDURE — 73030 X-RAY EXAM OF SHOULDER: CPT

## 2018-08-04 PROCEDURE — 96375 TX/PRO/DX INJ NEW DRUG ADDON: CPT

## 2018-08-04 PROCEDURE — 25010000002 ONDANSETRON PER 1 MG: Performed by: PHYSICIAN ASSISTANT

## 2018-08-04 PROCEDURE — 93005 ELECTROCARDIOGRAM TRACING: CPT | Performed by: PHYSICIAN ASSISTANT

## 2018-08-04 PROCEDURE — 73140 X-RAY EXAM OF FINGER(S): CPT

## 2018-08-04 PROCEDURE — 85018 HEMOGLOBIN: CPT | Performed by: PHYSICIAN ASSISTANT

## 2018-08-04 PROCEDURE — 83690 ASSAY OF LIPASE: CPT | Performed by: PHYSICIAN ASSISTANT

## 2018-08-04 PROCEDURE — 82150 ASSAY OF AMYLASE: CPT | Performed by: PHYSICIAN ASSISTANT

## 2018-08-04 PROCEDURE — 72125 CT NECK SPINE W/O DYE: CPT

## 2018-08-04 PROCEDURE — 71275 CT ANGIOGRAPHY CHEST: CPT | Performed by: RADIOLOGY

## 2018-08-04 PROCEDURE — 70450 CT HEAD/BRAIN W/O DYE: CPT | Performed by: RADIOLOGY

## 2018-08-04 RX ORDER — ONDANSETRON 2 MG/ML
4 INJECTION INTRAMUSCULAR; INTRAVENOUS ONCE
Status: COMPLETED | OUTPATIENT
Start: 2018-08-04 | End: 2018-08-04

## 2018-08-04 RX ORDER — SODIUM CHLORIDE 9 MG/ML
150 INJECTION, SOLUTION INTRAVENOUS CONTINUOUS
Status: DISCONTINUED | OUTPATIENT
Start: 2018-08-04 | End: 2018-08-04 | Stop reason: HOSPADM

## 2018-08-04 RX ORDER — SODIUM CHLORIDE 0.9 % (FLUSH) 0.9 %
10 SYRINGE (ML) INJECTION AS NEEDED
Status: DISCONTINUED | OUTPATIENT
Start: 2018-08-04 | End: 2018-08-04 | Stop reason: HOSPADM

## 2018-08-04 RX ORDER — SODIUM CHLORIDE 9 MG/ML
125 INJECTION, SOLUTION INTRAVENOUS CONTINUOUS
Status: DISCONTINUED | OUTPATIENT
Start: 2018-08-04 | End: 2018-08-04 | Stop reason: HOSPADM

## 2018-08-04 RX ADMIN — SODIUM CHLORIDE 125 ML/HR: 9 INJECTION, SOLUTION INTRAVENOUS at 13:23

## 2018-08-04 RX ADMIN — SODIUM CHLORIDE 1000 ML: 9 INJECTION, SOLUTION INTRAVENOUS at 13:48

## 2018-08-04 RX ADMIN — IOPAMIDOL 100 ML: 755 INJECTION, SOLUTION INTRAVENOUS at 14:17

## 2018-08-04 RX ADMIN — SODIUM CHLORIDE 1000 ML: 9 INJECTION, SOLUTION INTRAVENOUS at 14:36

## 2018-08-04 RX ADMIN — MORPHINE SULFATE 4 MG: 4 INJECTION, SOLUTION INTRAMUSCULAR; INTRAVENOUS at 15:12

## 2018-08-04 RX ADMIN — ONDANSETRON 4 MG: 2 INJECTION INTRAMUSCULAR; INTRAVENOUS at 14:36

## 2018-08-04 RX ADMIN — MORPHINE SULFATE 4 MG: 4 INJECTION, SOLUTION INTRAMUSCULAR; INTRAVENOUS at 14:35

## 2018-08-04 RX ADMIN — SODIUM CHLORIDE 150 ML/HR: 9 INJECTION, SOLUTION INTRAVENOUS at 15:51

## 2018-08-04 NOTE — ED NOTES
Called [facility] for possible transfer. They accepted. They are on their way at this time.      Manuela Kilgore  08/04/18 1528
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
## 2018-08-04 NOTE — ED NOTES
[facility] ALS crew here to transport pt to . Lori Stanley, RN  08/04/18 9648

## 2018-08-04 NOTE — ED NOTES
Pt left at this time via [facility] ems for transport to  Gonzalo King, LAINA  08/04/18 6152
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
## 2018-08-04 NOTE — ED NOTES
Encouraged pt to try for urine sample. Pt states he cannot urinate at this time. Nurse aware.     Veronica Spicer  08/04/18 5536

## 2018-08-06 ENCOUNTER — EPISODE CHANGES (OUTPATIENT)
Dept: CASE MANAGEMENT | Facility: OTHER | Age: 68
End: 2018-08-06

## 2020-02-05 RX ORDER — GLIMEPIRIDE 4 MG/1
4 TABLET ORAL 2 TIMES DAILY
Qty: 180 TABLET | Refills: 1 | Status: CANCELLED | OUTPATIENT
Start: 2020-02-05

## 2020-02-05 RX ORDER — AMITRIPTYLINE HYDROCHLORIDE 10 MG/1
10 TABLET, FILM COATED ORAL
Qty: 30 TABLET | Refills: 3 | Status: CANCELLED | OUTPATIENT
Start: 2020-02-05

## 2021-01-17 ENCOUNTER — APPOINTMENT (OUTPATIENT)
Dept: CT IMAGING | Facility: HOSPITAL | Age: 71
End: 2021-01-17

## 2021-01-17 ENCOUNTER — HOSPITAL ENCOUNTER (EMERGENCY)
Facility: HOSPITAL | Age: 71
Discharge: HOME | End: 2021-01-18
Attending: EMERGENCY MEDICINE | Admitting: EMERGENCY MEDICINE

## 2021-01-17 ENCOUNTER — APPOINTMENT (OUTPATIENT)
Dept: GENERAL RADIOLOGY | Facility: HOSPITAL | Age: 71
End: 2021-01-17

## 2021-01-17 DIAGNOSIS — U07.1 COVID-19: ICD-10-CM

## 2021-01-17 DIAGNOSIS — J12.82 PNEUMONIA DUE TO COVID-19 VIRUS: Primary | ICD-10-CM

## 2021-01-17 DIAGNOSIS — U07.1 PNEUMONIA DUE TO COVID-19 VIRUS: Primary | ICD-10-CM

## 2021-01-17 LAB
A-A DO2: 42.3 MMHG (ref 0–300)
ALBUMIN SERPL-MCNC: 4.39 G/DL (ref 3.5–5.2)
ALBUMIN/GLOB SERPL: 1.2 G/DL
ALP SERPL-CCNC: 89 U/L (ref 39–117)
ALT SERPL W P-5'-P-CCNC: 27 U/L (ref 1–41)
ANION GAP SERPL CALCULATED.3IONS-SCNC: 10.7 MMOL/L (ref 5–15)
ARTERIAL PATENCY WRIST A: POSITIVE
AST SERPL-CCNC: 27 U/L (ref 1–40)
ATMOSPHERIC PRESS: 723 MMHG
BACTERIA UR QL AUTO: NORMAL /HPF
BASE EXCESS BLDA CALC-SCNC: 0.3 MMOL/L (ref 0–2)
BASOPHILS # BLD AUTO: 0.02 10*3/MM3 (ref 0–0.2)
BASOPHILS NFR BLD AUTO: 0.4 % (ref 0–1.5)
BDY SITE: ABNORMAL
BILIRUB SERPL-MCNC: 0.3 MG/DL (ref 0–1.2)
BILIRUB UR QL STRIP: NEGATIVE
BODY TEMPERATURE: 0 C
BUN SERPL-MCNC: 17 MG/DL (ref 8–23)
BUN/CREAT SERPL: 13 (ref 7–25)
CALCIUM SPEC-SCNC: 9.5 MG/DL (ref 8.6–10.5)
CHLORIDE SERPL-SCNC: 100 MMOL/L (ref 98–107)
CLARITY UR: CLEAR
CO2 BLDA-SCNC: 23.5 MMOL/L (ref 22–33)
CO2 SERPL-SCNC: 25.3 MMOL/L (ref 22–29)
COHGB MFR BLD: 0.8 % (ref 0–5)
COLOR UR: YELLOW
CREAT SERPL-MCNC: 1.31 MG/DL (ref 0.76–1.27)
CRP SERPL-MCNC: 5.47 MG/DL (ref 0–0.5)
D DIMER PPP FEU-MCNC: 0.45 MCGFEU/ML (ref 0–0.5)
D-LACTATE SERPL-SCNC: 1.5 MMOL/L (ref 0.5–2)
DEPRECATED RDW RBC AUTO: 41.3 FL (ref 37–54)
EOSINOPHIL # BLD AUTO: 0.03 10*3/MM3 (ref 0–0.4)
EOSINOPHIL NFR BLD AUTO: 0.6 % (ref 0.3–6.2)
ERYTHROCYTE [DISTWIDTH] IN BLOOD BY AUTOMATED COUNT: 12.6 % (ref 12.3–15.4)
FERRITIN SERPL-MCNC: 290.4 NG/ML (ref 30–400)
GFR SERPL CREATININE-BSD FRML MDRD: 54 ML/MIN/1.73
GLOBULIN UR ELPH-MCNC: 3.7 GM/DL
GLUCOSE SERPL-MCNC: 210 MG/DL (ref 65–99)
GLUCOSE UR STRIP-MCNC: ABNORMAL MG/DL
HCO3 BLDA-SCNC: 22.6 MMOL/L (ref 20–26)
HCT VFR BLD AUTO: 43.9 % (ref 37.5–51)
HCT VFR BLD CALC: 43.1 % (ref 38–51)
HGB BLD-MCNC: 14.6 G/DL (ref 13–17.7)
HGB BLDA-MCNC: 14 G/DL (ref 14–18)
HGB UR QL STRIP.AUTO: NEGATIVE
HYALINE CASTS UR QL AUTO: NORMAL /LPF
IMM GRANULOCYTES # BLD AUTO: 0.03 10*3/MM3 (ref 0–0.05)
IMM GRANULOCYTES NFR BLD AUTO: 0.6 % (ref 0–0.5)
INHALED O2 CONCENTRATION: 21 %
KETONES UR QL STRIP: ABNORMAL
LEUKOCYTE ESTERASE UR QL STRIP.AUTO: NEGATIVE
LYMPHOCYTES # BLD AUTO: 1.13 10*3/MM3 (ref 0.7–3.1)
LYMPHOCYTES NFR BLD AUTO: 23.9 % (ref 19.6–45.3)
Lab: ABNORMAL
MCH RBC QN AUTO: 29.8 PG (ref 26.6–33)
MCHC RBC AUTO-ENTMCNC: 33.3 G/DL (ref 31.5–35.7)
MCV RBC AUTO: 89.6 FL (ref 79–97)
METHGB BLD QL: 0.2 % (ref 0–3)
MODALITY: ABNORMAL
MONOCYTES # BLD AUTO: 0.63 10*3/MM3 (ref 0.1–0.9)
MONOCYTES NFR BLD AUTO: 13.3 % (ref 5–12)
NEUTROPHILS NFR BLD AUTO: 2.88 10*3/MM3 (ref 1.7–7)
NEUTROPHILS NFR BLD AUTO: 61.2 % (ref 42.7–76)
NITRITE UR QL STRIP: NEGATIVE
NOTE: ABNORMAL
NRBC BLD AUTO-RTO: 0 /100 WBC (ref 0–0.2)
NT-PROBNP SERPL-MCNC: 68.2 PG/ML (ref 0–900)
OXYHGB MFR BLDV: 94 % (ref 94–99)
PCO2 BLDA: 29.5 MM HG (ref 35–45)
PCO2 TEMP ADJ BLD: ABNORMAL MM[HG]
PH BLDA: 7.49 PH UNITS (ref 7.35–7.45)
PH UR STRIP.AUTO: <=5 [PH] (ref 5–8)
PH, TEMP CORRECTED: ABNORMAL
PLATELET # BLD AUTO: 81 10*3/MM3 (ref 140–450)
PMV BLD AUTO: 10.7 FL (ref 6–12)
PO2 BLDA: 66.3 MM HG (ref 83–108)
PO2 TEMP ADJ BLD: ABNORMAL MM[HG]
POTASSIUM SERPL-SCNC: 4.5 MMOL/L (ref 3.5–5.2)
PROT SERPL-MCNC: 8.1 G/DL (ref 6–8.5)
PROT UR QL STRIP: ABNORMAL
RBC # BLD AUTO: 4.9 10*6/MM3 (ref 4.14–5.8)
RBC # UR: NORMAL /HPF
REF LAB TEST METHOD: NORMAL
SAO2 % BLDCOA: 94.9 % (ref 94–99)
SODIUM SERPL-SCNC: 136 MMOL/L (ref 136–145)
SP GR UR STRIP: >1.03 (ref 1–1.03)
SQUAMOUS #/AREA URNS HPF: NORMAL /HPF
UROBILINOGEN UR QL STRIP: ABNORMAL
VENTILATOR MODE: ABNORMAL
WBC # BLD AUTO: 4.72 10*3/MM3 (ref 3.4–10.8)
WBC UR QL AUTO: NORMAL /HPF

## 2021-01-17 PROCEDURE — 99284 EMERGENCY DEPT VISIT MOD MDM: CPT

## 2021-01-17 PROCEDURE — 99283 EMERGENCY DEPT VISIT LOW MDM: CPT

## 2021-01-17 PROCEDURE — 71045 X-RAY EXAM CHEST 1 VIEW: CPT

## 2021-01-17 PROCEDURE — 96365 THER/PROPH/DIAG IV INF INIT: CPT

## 2021-01-17 PROCEDURE — M0239 BAMLANIVIMAB-XXXX INFUSION: HCPCS | Performed by: EMERGENCY MEDICINE

## 2021-01-17 PROCEDURE — 80053 COMPREHEN METABOLIC PANEL: CPT | Performed by: PHYSICIAN ASSISTANT

## 2021-01-17 PROCEDURE — 82728 ASSAY OF FERRITIN: CPT | Performed by: PHYSICIAN ASSISTANT

## 2021-01-17 PROCEDURE — 36415 COLL VENOUS BLD VENIPUNCTURE: CPT

## 2021-01-17 PROCEDURE — 25010000002 CEFTRIAXONE: Performed by: EMERGENCY MEDICINE

## 2021-01-17 PROCEDURE — 86140 C-REACTIVE PROTEIN: CPT | Performed by: PHYSICIAN ASSISTANT

## 2021-01-17 PROCEDURE — 96367 TX/PROPH/DG ADDL SEQ IV INF: CPT

## 2021-01-17 PROCEDURE — 83880 ASSAY OF NATRIURETIC PEPTIDE: CPT | Performed by: PHYSICIAN ASSISTANT

## 2021-01-17 PROCEDURE — 25010000006 BAMLANIVIMAB 700 MG/20ML SOLUTION 20 ML VIAL: Performed by: EMERGENCY MEDICINE

## 2021-01-17 PROCEDURE — 36600 WITHDRAWAL OF ARTERIAL BLOOD: CPT

## 2021-01-17 PROCEDURE — 71250 CT THORAX DX C-: CPT

## 2021-01-17 PROCEDURE — 83050 HGB METHEMOGLOBIN QUAN: CPT

## 2021-01-17 PROCEDURE — 82805 BLOOD GASES W/O2 SATURATION: CPT

## 2021-01-17 PROCEDURE — 84484 ASSAY OF TROPONIN QUANT: CPT | Performed by: EMERGENCY MEDICINE

## 2021-01-17 PROCEDURE — 83605 ASSAY OF LACTIC ACID: CPT | Performed by: PHYSICIAN ASSISTANT

## 2021-01-17 PROCEDURE — 82375 ASSAY CARBOXYHB QUANT: CPT

## 2021-01-17 PROCEDURE — 85379 FIBRIN DEGRADATION QUANT: CPT | Performed by: PHYSICIAN ASSISTANT

## 2021-01-17 PROCEDURE — 85025 COMPLETE CBC W/AUTO DIFF WBC: CPT | Performed by: PHYSICIAN ASSISTANT

## 2021-01-17 PROCEDURE — 84145 PROCALCITONIN (PCT): CPT | Performed by: PHYSICIAN ASSISTANT

## 2021-01-17 PROCEDURE — 87040 BLOOD CULTURE FOR BACTERIA: CPT | Performed by: EMERGENCY MEDICINE

## 2021-01-17 PROCEDURE — 81001 URINALYSIS AUTO W/SCOPE: CPT | Performed by: PHYSICIAN ASSISTANT

## 2021-01-17 RX ORDER — SODIUM CHLORIDE 9 MG/ML
30 INJECTION, SOLUTION INTRAVENOUS ONCE
Status: COMPLETED | OUTPATIENT
Start: 2021-01-17 | End: 2021-01-17

## 2021-01-17 RX ORDER — SODIUM CHLORIDE 0.9 % (FLUSH) 0.9 %
10 SYRINGE (ML) INJECTION AS NEEDED
Status: DISCONTINUED | OUTPATIENT
Start: 2021-01-17 | End: 2021-01-18 | Stop reason: HOSPADM

## 2021-01-17 RX ORDER — ACETAMINOPHEN 500 MG
1000 TABLET ORAL ONCE
Status: COMPLETED | OUTPATIENT
Start: 2021-01-18 | End: 2021-01-18

## 2021-01-17 RX ORDER — EPINEPHRINE 1 MG/ML
0.3 INJECTION, SOLUTION INTRAMUSCULAR; SUBCUTANEOUS ONCE AS NEEDED
Status: DISCONTINUED | OUTPATIENT
Start: 2021-01-17 | End: 2021-01-18 | Stop reason: HOSPADM

## 2021-01-17 RX ORDER — DIPHENHYDRAMINE HYDROCHLORIDE 50 MG/ML
50 INJECTION INTRAMUSCULAR; INTRAVENOUS ONCE AS NEEDED
Status: DISCONTINUED | OUTPATIENT
Start: 2021-01-17 | End: 2021-01-18 | Stop reason: HOSPADM

## 2021-01-17 RX ORDER — METHYLPREDNISOLONE SODIUM SUCCINATE 125 MG/2ML
125 INJECTION, POWDER, LYOPHILIZED, FOR SOLUTION INTRAMUSCULAR; INTRAVENOUS ONCE AS NEEDED
Status: DISCONTINUED | OUTPATIENT
Start: 2021-01-17 | End: 2021-01-18 | Stop reason: HOSPADM

## 2021-01-17 RX ORDER — DEXAMETHASONE SODIUM PHOSPHATE 10 MG/ML
10 INJECTION INTRAMUSCULAR; INTRAVENOUS ONCE
Status: DISCONTINUED | OUTPATIENT
Start: 2021-01-17 | End: 2021-01-17

## 2021-01-17 RX ADMIN — CEFTRIAXONE 1 G: 1 INJECTION, POWDER, FOR SOLUTION INTRAMUSCULAR; INTRAVENOUS at 21:55

## 2021-01-17 RX ADMIN — SODIUM CHLORIDE 700 MG: 9 INJECTION, SOLUTION INTRAVENOUS at 20:37

## 2021-01-17 RX ADMIN — DOXYCYCLINE 100 MG: 100 INJECTION, POWDER, LYOPHILIZED, FOR SOLUTION INTRAVENOUS at 22:26

## 2021-01-17 RX ADMIN — SODIUM CHLORIDE 30 ML: 9 INJECTION, SOLUTION INTRAVENOUS at 21:55

## 2021-01-18 VITALS
OXYGEN SATURATION: 96 % | HEART RATE: 77 BPM | RESPIRATION RATE: 20 BRPM | HEIGHT: 71 IN | TEMPERATURE: 99 F | BODY MASS INDEX: 29.4 KG/M2 | DIASTOLIC BLOOD PRESSURE: 89 MMHG | WEIGHT: 210 LBS | SYSTOLIC BLOOD PRESSURE: 107 MMHG

## 2021-01-18 LAB
PROCALCITONIN SERPL-MCNC: 0.1 NG/ML (ref 0–0.25)
QT INTERVAL: 350 MS
QTC INTERVAL: 413 MS
TROPONIN T SERPL-MCNC: <0.01 NG/ML (ref 0–0.03)

## 2021-01-18 PROCEDURE — 93010 ELECTROCARDIOGRAM REPORT: CPT | Performed by: INTERNAL MEDICINE

## 2021-01-18 PROCEDURE — 93005 ELECTROCARDIOGRAM TRACING: CPT | Performed by: EMERGENCY MEDICINE

## 2021-01-18 RX ORDER — AZITHROMYCIN 500 MG/1
500 TABLET, FILM COATED ORAL DAILY
Qty: 5 TABLET | Refills: 0 | Status: SHIPPED | OUTPATIENT
Start: 2021-01-18

## 2021-01-18 RX ORDER — DEXAMETHASONE 6 MG/1
6 TABLET ORAL DAILY
Qty: 10 TABLET | Refills: 0 | Status: SHIPPED | OUTPATIENT
Start: 2021-01-18 | End: 2021-01-28

## 2021-01-18 RX ADMIN — SODIUM CHLORIDE 500 ML: 9 INJECTION, SOLUTION INTRAVENOUS at 00:41

## 2021-01-18 RX ADMIN — ACETAMINOPHEN 1000 MG: 500 TABLET ORAL at 00:41

## 2021-01-22 LAB
BACTERIA SPEC AEROBE CULT: NORMAL
BACTERIA SPEC AEROBE CULT: NORMAL

## 2021-05-07 ENCOUNTER — IMMUNIZATION (OUTPATIENT)
Dept: VACCINE CLINIC | Facility: HOSPITAL | Age: 71
End: 2021-05-07

## 2021-05-07 PROCEDURE — 91300 HC SARSCOV02 VAC 30MCG/0.3ML IM: CPT | Performed by: INTERNAL MEDICINE

## 2021-05-07 PROCEDURE — 0001A: CPT | Performed by: INTERNAL MEDICINE

## 2021-05-28 ENCOUNTER — IMMUNIZATION (OUTPATIENT)
Dept: VACCINE CLINIC | Facility: HOSPITAL | Age: 71
End: 2021-05-28

## 2021-05-28 PROCEDURE — 0002A: CPT | Performed by: INTERNAL MEDICINE

## 2021-05-28 PROCEDURE — 91300 HC SARSCOV02 VAC 30MCG/0.3ML IM: CPT | Performed by: INTERNAL MEDICINE

## 2022-02-11 ENCOUNTER — APPOINTMENT (OUTPATIENT)
Dept: MRI IMAGING | Facility: HOSPITAL | Age: 72
End: 2022-02-11

## 2022-02-11 ENCOUNTER — HOSPITAL ENCOUNTER (EMERGENCY)
Facility: HOSPITAL | Age: 72
Discharge: HOME | End: 2022-02-11
Attending: EMERGENCY MEDICINE | Admitting: EMERGENCY MEDICINE

## 2022-02-11 ENCOUNTER — APPOINTMENT (OUTPATIENT)
Dept: CT IMAGING | Facility: HOSPITAL | Age: 72
End: 2022-02-11

## 2022-02-11 VITALS
HEART RATE: 90 BPM | TEMPERATURE: 97.7 F | OXYGEN SATURATION: 97 % | HEIGHT: 71 IN | SYSTOLIC BLOOD PRESSURE: 138 MMHG | WEIGHT: 210 LBS | BODY MASS INDEX: 29.4 KG/M2 | DIASTOLIC BLOOD PRESSURE: 90 MMHG | RESPIRATION RATE: 18 BRPM

## 2022-02-11 DIAGNOSIS — H70.90 MASTOIDITIS, UNSPECIFIED LATERALITY: ICD-10-CM

## 2022-02-11 DIAGNOSIS — N28.9 RENAL INSUFFICIENCY: ICD-10-CM

## 2022-02-11 DIAGNOSIS — R11.2 NAUSEA AND VOMITING IN ADULT: ICD-10-CM

## 2022-02-11 DIAGNOSIS — H81.10 BENIGN PAROXYSMAL VERTIGO, UNSPECIFIED LATERALITY: Primary | ICD-10-CM

## 2022-02-11 LAB
ALBUMIN SERPL-MCNC: 4.32 G/DL (ref 3.5–5.2)
ALBUMIN/GLOB SERPL: 1.4 G/DL
ALP SERPL-CCNC: 58 U/L (ref 39–117)
ALT SERPL W P-5'-P-CCNC: 16 U/L (ref 1–41)
ANION GAP SERPL CALCULATED.3IONS-SCNC: 10.8 MMOL/L (ref 5–15)
ANION GAP SERPL CALCULATED.3IONS-SCNC: 10.9 MMOL/L (ref 5–15)
AST SERPL-CCNC: 20 U/L (ref 1–40)
BASOPHILS # BLD AUTO: 0.04 10*3/MM3 (ref 0–0.2)
BASOPHILS NFR BLD AUTO: 0.6 % (ref 0–1.5)
BILIRUB SERPL-MCNC: 0.4 MG/DL (ref 0–1.2)
BILIRUB UR QL STRIP: NEGATIVE
BUN SERPL-MCNC: 25 MG/DL (ref 8–23)
BUN SERPL-MCNC: 26 MG/DL (ref 8–23)
BUN/CREAT SERPL: 17.9 (ref 7–25)
BUN/CREAT SERPL: 19.4 (ref 7–25)
CALCIUM SPEC-SCNC: 9.3 MG/DL (ref 8.6–10.5)
CALCIUM SPEC-SCNC: 9.9 MG/DL (ref 8.6–10.5)
CHLORIDE SERPL-SCNC: 101 MMOL/L (ref 98–107)
CHLORIDE SERPL-SCNC: 98 MMOL/L (ref 98–107)
CLARITY UR: CLEAR
CO2 SERPL-SCNC: 22.1 MMOL/L (ref 22–29)
CO2 SERPL-SCNC: 22.2 MMOL/L (ref 22–29)
COLOR UR: YELLOW
CREAT SERPL-MCNC: 1.29 MG/DL (ref 0.76–1.27)
CREAT SERPL-MCNC: 1.45 MG/DL (ref 0.76–1.27)
CRP SERPL-MCNC: <0.3 MG/DL (ref 0–0.5)
D-LACTATE SERPL-SCNC: 1.5 MMOL/L (ref 0.5–2)
DEPRECATED RDW RBC AUTO: 41.4 FL (ref 37–54)
EOSINOPHIL # BLD AUTO: 0.39 10*3/MM3 (ref 0–0.4)
EOSINOPHIL NFR BLD AUTO: 5.7 % (ref 0.3–6.2)
ERYTHROCYTE [DISTWIDTH] IN BLOOD BY AUTOMATED COUNT: 12.6 % (ref 12.3–15.4)
FLUAV RNA RESP QL NAA+PROBE: NOT DETECTED
FLUBV RNA RESP QL NAA+PROBE: NOT DETECTED
GFR SERPL CREATININE-BSD FRML MDRD: 48 ML/MIN/1.73
GFR SERPL CREATININE-BSD FRML MDRD: 55 ML/MIN/1.73
GLOBULIN UR ELPH-MCNC: 3 GM/DL
GLUCOSE SERPL-MCNC: 226 MG/DL (ref 65–99)
GLUCOSE SERPL-MCNC: 279 MG/DL (ref 65–99)
GLUCOSE UR STRIP-MCNC: ABNORMAL MG/DL
HCT VFR BLD AUTO: 46.2 % (ref 37.5–51)
HGB BLD-MCNC: 15.2 G/DL (ref 13–17.7)
HGB UR QL STRIP.AUTO: NEGATIVE
HOLD SPECIMEN: NORMAL
HOLD SPECIMEN: NORMAL
IMM GRANULOCYTES # BLD AUTO: 0.04 10*3/MM3 (ref 0–0.05)
IMM GRANULOCYTES NFR BLD AUTO: 0.6 % (ref 0–0.5)
KETONES UR QL STRIP: NEGATIVE
LEUKOCYTE ESTERASE UR QL STRIP.AUTO: NEGATIVE
LIPASE SERPL-CCNC: 276 U/L (ref 13–60)
LYMPHOCYTES # BLD AUTO: 1.5 10*3/MM3 (ref 0.7–3.1)
LYMPHOCYTES NFR BLD AUTO: 21.8 % (ref 19.6–45.3)
MAGNESIUM SERPL-MCNC: 2 MG/DL (ref 1.6–2.4)
MCH RBC QN AUTO: 29.5 PG (ref 26.6–33)
MCHC RBC AUTO-ENTMCNC: 32.9 G/DL (ref 31.5–35.7)
MCV RBC AUTO: 89.5 FL (ref 79–97)
MONOCYTES # BLD AUTO: 0.46 10*3/MM3 (ref 0.1–0.9)
MONOCYTES NFR BLD AUTO: 6.7 % (ref 5–12)
NEUTROPHILS NFR BLD AUTO: 4.45 10*3/MM3 (ref 1.7–7)
NEUTROPHILS NFR BLD AUTO: 64.6 % (ref 42.7–76)
NITRITE UR QL STRIP: NEGATIVE
NRBC BLD AUTO-RTO: 0 /100 WBC (ref 0–0.2)
PH UR STRIP.AUTO: 5.5 [PH] (ref 5–8)
PLATELET # BLD AUTO: 189 10*3/MM3 (ref 140–450)
PMV BLD AUTO: 9.9 FL (ref 6–12)
POTASSIUM SERPL-SCNC: 5 MMOL/L (ref 3.5–5.2)
POTASSIUM SERPL-SCNC: 5.6 MMOL/L (ref 3.5–5.2)
POTASSIUM SERPL-SCNC: 5.8 MMOL/L (ref 3.5–5.2)
POTASSIUM SERPL-SCNC: 6.2 MMOL/L (ref 3.5–5.2)
PROT SERPL-MCNC: 7.3 G/DL (ref 6–8.5)
PROT UR QL STRIP: NEGATIVE
QT INTERVAL: 404 MS
QTC INTERVAL: 410 MS
RBC # BLD AUTO: 5.16 10*6/MM3 (ref 4.14–5.8)
SARS-COV-2 RNA RESP QL NAA+PROBE: NOT DETECTED
SODIUM SERPL-SCNC: 131 MMOL/L (ref 136–145)
SODIUM SERPL-SCNC: 134 MMOL/L (ref 136–145)
SP GR UR STRIP: 1.03 (ref 1–1.03)
TROPONIN T SERPL-MCNC: <0.01 NG/ML (ref 0–0.03)
UROBILINOGEN UR QL STRIP: ABNORMAL
WBC NRBC COR # BLD: 6.88 10*3/MM3 (ref 3.4–10.8)
WHOLE BLOOD HOLD SPECIMEN: NORMAL
WHOLE BLOOD HOLD SPECIMEN: NORMAL

## 2022-02-11 PROCEDURE — 25010000002 DEXAMETHASONE PER 1 MG: Performed by: PHYSICIAN ASSISTANT

## 2022-02-11 PROCEDURE — 25010000002 KETOROLAC TROMETHAMINE PER 15 MG: Performed by: PHYSICIAN ASSISTANT

## 2022-02-11 PROCEDURE — 83690 ASSAY OF LIPASE: CPT | Performed by: NURSE PRACTITIONER

## 2022-02-11 PROCEDURE — 84132 ASSAY OF SERUM POTASSIUM: CPT | Performed by: PHYSICIAN ASSISTANT

## 2022-02-11 PROCEDURE — 86140 C-REACTIVE PROTEIN: CPT | Performed by: NURSE PRACTITIONER

## 2022-02-11 PROCEDURE — 70450 CT HEAD/BRAIN W/O DYE: CPT

## 2022-02-11 PROCEDURE — 96375 TX/PRO/DX INJ NEW DRUG ADDON: CPT

## 2022-02-11 PROCEDURE — 25010000002 ONDANSETRON PER 1 MG: Performed by: PHYSICIAN ASSISTANT

## 2022-02-11 PROCEDURE — 85025 COMPLETE CBC W/AUTO DIFF WBC: CPT | Performed by: NURSE PRACTITIONER

## 2022-02-11 PROCEDURE — 93005 ELECTROCARDIOGRAM TRACING: CPT | Performed by: STUDENT IN AN ORGANIZED HEALTH CARE EDUCATION/TRAINING PROGRAM

## 2022-02-11 PROCEDURE — 81003 URINALYSIS AUTO W/O SCOPE: CPT | Performed by: NURSE PRACTITIONER

## 2022-02-11 PROCEDURE — 70450 CT HEAD/BRAIN W/O DYE: CPT | Performed by: RADIOLOGY

## 2022-02-11 PROCEDURE — 74176 CT ABD & PELVIS W/O CONTRAST: CPT | Performed by: RADIOLOGY

## 2022-02-11 PROCEDURE — 99284 EMERGENCY DEPT VISIT MOD MDM: CPT

## 2022-02-11 PROCEDURE — 80053 COMPREHEN METABOLIC PANEL: CPT | Performed by: NURSE PRACTITIONER

## 2022-02-11 PROCEDURE — 70551 MRI BRAIN STEM W/O DYE: CPT | Performed by: RADIOLOGY

## 2022-02-11 PROCEDURE — 84484 ASSAY OF TROPONIN QUANT: CPT | Performed by: NURSE PRACTITIONER

## 2022-02-11 PROCEDURE — 93010 ELECTROCARDIOGRAM REPORT: CPT | Performed by: INTERNAL MEDICINE

## 2022-02-11 PROCEDURE — 36415 COLL VENOUS BLD VENIPUNCTURE: CPT

## 2022-02-11 PROCEDURE — 83735 ASSAY OF MAGNESIUM: CPT | Performed by: NURSE PRACTITIONER

## 2022-02-11 PROCEDURE — 70551 MRI BRAIN STEM W/O DYE: CPT

## 2022-02-11 PROCEDURE — 87636 SARSCOV2 & INF A&B AMP PRB: CPT | Performed by: NURSE PRACTITIONER

## 2022-02-11 PROCEDURE — 96374 THER/PROPH/DIAG INJ IV PUSH: CPT

## 2022-02-11 PROCEDURE — 83605 ASSAY OF LACTIC ACID: CPT | Performed by: NURSE PRACTITIONER

## 2022-02-11 PROCEDURE — 93005 ELECTROCARDIOGRAM TRACING: CPT | Performed by: NURSE PRACTITIONER

## 2022-02-11 PROCEDURE — 74176 CT ABD & PELVIS W/O CONTRAST: CPT

## 2022-02-11 RX ORDER — MECLIZINE HCL 12.5 MG/1
25 TABLET ORAL ONCE
Status: COMPLETED | OUTPATIENT
Start: 2022-02-11 | End: 2022-02-11

## 2022-02-11 RX ORDER — CEFDINIR 300 MG/1
300 CAPSULE ORAL 2 TIMES DAILY
Qty: 20 CAPSULE | Refills: 0 | Status: SHIPPED | OUTPATIENT
Start: 2022-02-11 | End: 2022-02-21

## 2022-02-11 RX ORDER — ONDANSETRON 2 MG/ML
4 INJECTION INTRAMUSCULAR; INTRAVENOUS ONCE
Status: COMPLETED | OUTPATIENT
Start: 2022-02-11 | End: 2022-02-11

## 2022-02-11 RX ORDER — SODIUM POLYSTYRENE SULFONATE 4.1 MEQ/G
30 POWDER, FOR SUSPENSION ORAL; RECTAL ONCE
Status: COMPLETED | OUTPATIENT
Start: 2022-02-11 | End: 2022-02-11

## 2022-02-11 RX ORDER — LACTULOSE 10 G/15ML
20 SOLUTION ORAL DAILY
Status: DISCONTINUED | OUTPATIENT
Start: 2022-02-11 | End: 2022-02-11 | Stop reason: HOSPADM

## 2022-02-11 RX ORDER — DEXAMETHASONE SODIUM PHOSPHATE 10 MG/ML
10 INJECTION INTRAMUSCULAR; INTRAVENOUS ONCE
Status: COMPLETED | OUTPATIENT
Start: 2022-02-11 | End: 2022-02-11

## 2022-02-11 RX ORDER — KETOROLAC TROMETHAMINE 30 MG/ML
15 INJECTION, SOLUTION INTRAMUSCULAR; INTRAVENOUS ONCE
Status: COMPLETED | OUTPATIENT
Start: 2022-02-11 | End: 2022-02-11

## 2022-02-11 RX ORDER — SODIUM CHLORIDE 0.9 % (FLUSH) 0.9 %
10 SYRINGE (ML) INJECTION AS NEEDED
Status: DISCONTINUED | OUTPATIENT
Start: 2022-02-11 | End: 2022-02-11 | Stop reason: HOSPADM

## 2022-02-11 RX ADMIN — MECLIZINE HCL 12.5 MG 25 MG: 12.5 TABLET ORAL at 11:33

## 2022-02-11 RX ADMIN — LACTULOSE 20 G: 20 SOLUTION ORAL at 15:33

## 2022-02-11 RX ADMIN — KETOROLAC TROMETHAMINE 15 MG: 30 INJECTION, SOLUTION INTRAMUSCULAR; INTRAVENOUS at 11:33

## 2022-02-11 RX ADMIN — SODIUM CHLORIDE 1000 ML: 9 INJECTION, SOLUTION INTRAVENOUS at 15:00

## 2022-02-11 RX ADMIN — SODIUM POLYSTYRENE SULFONATE 30 G: 1 POWDER ORAL; RECTAL at 15:33

## 2022-02-11 RX ADMIN — DEXAMETHASONE SODIUM PHOSPHATE 10 MG: 10 INJECTION INTRAMUSCULAR; INTRAVENOUS at 11:33

## 2022-02-11 RX ADMIN — SODIUM CHLORIDE 1000 ML: 9 INJECTION, SOLUTION INTRAVENOUS at 09:23

## 2022-02-11 RX ADMIN — MECLIZINE HCL 12.5 MG 25 MG: 12.5 TABLET ORAL at 13:38

## 2022-02-11 RX ADMIN — ONDANSETRON 4 MG: 2 INJECTION INTRAMUSCULAR; INTRAVENOUS at 09:24

## 2022-02-11 NOTE — ED PROVIDER NOTES
Subjective   72-year-old male who presents to the emergency department chief complaint nausea, vomiting, dizziness.  Patient has history of diabetes, hypertension.  Patient does have generalized abdominal cramping.  Denies any associated fever, chills, body aches.      History provided by:  Patient   used: No    Vomiting  The primary symptoms include abdominal pain, nausea and vomiting. Primary symptoms do not include dysuria, arthralgias or rash. The onset was sudden.   The illness is also significant for chills. The illness does not include anorexia, dysphagia, odynophagia, bloating, tenesmus or itching. Associated medical issues do not include GERD, gallstones, liver disease, alcohol abuse, PUD, bowel resection, irritable bowel syndrome, hemorrhoids or diverticulitis.       Review of Systems   Constitutional: Positive for chills.   HENT: Negative.  Negative for drooling, ear pain, facial swelling, mouth sores, nosebleeds and postnasal drip.    Eyes: Negative.  Negative for pain, discharge, redness and itching.   Respiratory: Negative.  Negative for cough, choking and chest tightness.    Cardiovascular: Negative.  Negative for chest pain, palpitations and leg swelling.   Gastrointestinal: Positive for abdominal distention, abdominal pain, nausea and vomiting. Negative for anorexia, bloating and dysphagia.   Endocrine: Negative.  Negative for cold intolerance, heat intolerance and polyphagia.   Genitourinary: Negative.  Negative for difficulty urinating, dysuria, enuresis, flank pain, frequency, hematuria, penile discharge and penile swelling.   Musculoskeletal: Negative.  Negative for arthralgias, gait problem, joint swelling and neck pain.   Skin: Negative.  Negative for color change, itching, pallor, rash and wound.   Hematological: Negative.    Psychiatric/Behavioral: Negative.  Negative for behavioral problems, confusion, dysphoric mood and hallucinations. The patient is not  nervous/anxious.    All other systems reviewed and are negative.      Past Medical History:   Diagnosis Date   • Diabetes mellitus (CMS/HCC)    • Hypertension        No Known Allergies    Past Surgical History:   Procedure Laterality Date   • CARDIAC CATHETERIZATION N/A 5/4/2018    Procedure: Left Heart Cath;  Surgeon: Jose Dickerson MD;  Location: [facility] CATH INVASIVE LOCATION;  Service: Cardiovascular   • CHOLECYSTECTOMY     • MOUTH SURGERY         Family History   Problem Relation Age of Onset   • No Known Problems Father    • No Known Problems Mother        Social History     Socioeconomic History   • Marital status:    Tobacco Use   • Smoking status: Former Smoker   • Smokeless tobacco: Never Used   Substance and Sexual Activity   • Alcohol use: No   • Drug use: No   • Sexual activity: Yes     Partners: Female           Objective   Physical Exam  Vitals and nursing note reviewed.   Constitutional:       General: He is not in acute distress.     Appearance: Normal appearance. He is well-developed and normal weight. He is not ill-appearing, toxic-appearing or diaphoretic.   HENT:      Head: Normocephalic and atraumatic.      Right Ear: Tympanic membrane, ear canal and external ear normal. There is no impacted cerumen.      Left Ear: Tympanic membrane, ear canal and external ear normal. There is no impacted cerumen.      Nose: Nose normal. No congestion or rhinorrhea.      Mouth/Throat:      Mouth: Mucous membranes are moist.      Pharynx: Oropharynx is clear. No oropharyngeal exudate or posterior oropharyngeal erythema.   Eyes:      General: No scleral icterus.        Right eye: No discharge.         Left eye: No discharge.      Conjunctiva/sclera: Conjunctivae normal.      Pupils: Pupils are equal, round, and reactive to light.   Cardiovascular:      Rate and Rhythm: Normal rate and regular rhythm.      Pulses: Normal pulses.      Heart sounds: Normal heart sounds. No murmur heard.  No gallop.     Pulmonary:      Effort: Pulmonary effort is normal. No respiratory distress.      Breath sounds: Normal breath sounds. No stridor. No wheezing, rhonchi or rales.   Abdominal:      General: Bowel sounds are normal.      Palpations: Abdomen is soft. There is no mass.      Tenderness: There is generalized abdominal tenderness. There is no rebound.      Hernia: No hernia is present.   Musculoskeletal:         General: No swelling, tenderness, deformity or signs of injury. Normal range of motion.      Cervical back: Normal range of motion and neck supple. No rigidity or tenderness.      Right lower leg: No edema.      Left lower leg: No edema.   Lymphadenopathy:      Cervical: No cervical adenopathy.   Skin:     General: Skin is warm and dry.      Capillary Refill: Capillary refill takes less than 2 seconds.      Coloration: Skin is not jaundiced or pale.      Findings: No bruising, erythema, lesion or rash.   Neurological:      General: No focal deficit present.      Mental Status: He is alert and oriented to person, place, and time.      Cranial Nerves: No cranial nerve deficit.      Sensory: No sensory deficit.      Motor: No weakness.      Coordination: Coordination normal.      Gait: Gait normal.      Deep Tendon Reflexes: Reflexes normal.   Psychiatric:         Behavior: Behavior normal.         Thought Content: Thought content normal.         Judgment: Judgment normal.         Procedures           ED Course  ED Course as of 02/11/22 1809 Fri Feb 11, 2022   0529 EKG noted sinus rhythm.  60 bpm.  .  QRS 74.  QTc 410.  No acute ST elevation [SF]   1038 IMPRESSION:  1.  No acute findings identified.  2.  Diverticulosis without diverticulitis.  3.  Moderate constipation. No bowel obstruction.  4.  Nonobstructing right kidney stone. No hydronephrosis.  5.  Fatty liver.  6.  Prior cholecystectomy.  7.  Other nonacute findings as above.    [BH]   1038    IMPRESSION:    Stable senescent changes. No CT evidence of  acute intracranial  abnormality. []   1252 IMPRESSION:  1.  No acute intracranial findings. No acute infarct.  2.  Right greater than left mastoiditis.  3.  Moderate chronic small vessel ischemic disease.    []   1317 Discussed care with patient.  Patient comes in complaining of dizziness, nausea with dry heaving.  Patient did have nystagmus present on physical exam.  Patient did have a CT scan and MRI does not show any acute ischemic event.  Patient does have right greater than left mastoiditis.  I discussed this case with Dr. Smiley, my attending physician. He has advised me to treat patient will omnicef. Did not advice an admission as we currently have no available beds.  []   1504 Discussed with Dr. Barrera advised to give kayexalate and stop lisinopril and follow up with PCP to discuss restarting it.  []      ED Course User Index  [] Lawson Molina PA-C  [] Catherine Molina PA-C  [] Minh Sarmiento, DO                                                 MDM    Final diagnoses:   Benign paroxysmal vertigo, unspecified laterality   Nausea and vomiting in adult   Renal insufficiency   Mastoiditis, unspecified laterality       ED Disposition  ED Disposition     ED Disposition Condition Comment    Discharge Stable           Iris Silverman Sascha, APRN  [address] [facility] [address]  [phone]    Call in 1 day      Kailash Helms MD  [address] [facility] [address]  [facility] [address]  [phone]    Call in 1 day           Medication List      New Prescriptions    cefdinir 300 MG capsule  Commonly known as: OMNICEF  Take 1 capsule by mouth 2 (Two) Times a Day for 10 days.        Stop    lisinopril 40 MG tablet  Commonly known as: PRINIVIL,ZESTRIL           Where to Get Your Medications      You can get these medications from any pharmacy    Bring a paper prescription for each of these medications  · cefdinir 300 MG capsule          Catherine Molina PA-C  02/11/22 4506

## 2022-02-11 NOTE — ED NOTES
MEDICAL SCREENING:    Reason for Visit: Vomiting, dizziness    Patient initially seen in triage.  The patient was advised further evaluation and diagnostic testing will be needed, some of the treatment and testing will be initiated in the lobby in order to begin the process.  The patient will be returned to the waiting area for the time being and possibly be re-assessed by a subsequent ED provider.  The patient will be brought back to the treatment area in as timely manner as possible.       Danyell Zepeda, APRN  02/11/22 0602

## 2022-02-11 NOTE — ED NOTES
Pt sitting in lobby, nad noted, respirations nonlabored and even, awake and alert.     Shanell Montenegro, RN  02/11/22 0792

## 2022-02-11 NOTE — ED NOTES
Pt sitting in lobby, nad noted, respirations nonlabored and even, awake and alert     Shanell Montenegro, RN  02/11/22 5173

## 2022-03-15 ENCOUNTER — TRANSCRIBE ORDERS (OUTPATIENT)
Dept: ADMINISTRATIVE | Facility: HOSPITAL | Age: 72
End: 2022-03-15

## 2022-03-15 ENCOUNTER — LAB (OUTPATIENT)
Dept: LAB | Facility: HOSPITAL | Age: 72
End: 2022-03-15

## 2022-03-15 DIAGNOSIS — N17.9 ACUTE KIDNEY INJURY: Primary | ICD-10-CM

## 2022-03-15 DIAGNOSIS — N17.9 ACUTE RENAL FAILURE, UNSPECIFIED ACUTE RENAL FAILURE TYPE: ICD-10-CM

## 2022-03-15 DIAGNOSIS — N17.9 ACUTE RENAL FAILURE, UNSPECIFIED ACUTE RENAL FAILURE TYPE: Primary | ICD-10-CM

## 2022-03-15 LAB
ANION GAP SERPL CALCULATED.3IONS-SCNC: 9 MMOL/L (ref 5–15)
BUN SERPL-MCNC: 23 MG/DL (ref 8–23)
BUN/CREAT SERPL: 19.7 (ref 7–25)
CALCIUM SPEC-SCNC: 8.6 MG/DL (ref 8.6–10.5)
CHLORIDE SERPL-SCNC: 104 MMOL/L (ref 98–107)
CO2 SERPL-SCNC: 23 MMOL/L (ref 22–29)
CREAT SERPL-MCNC: 1.17 MG/DL (ref 0.76–1.27)
EGFRCR SERPLBLD CKD-EPI 2021: 66.6 ML/MIN/1.73
GLUCOSE SERPL-MCNC: 169 MG/DL (ref 65–99)
POTASSIUM SERPL-SCNC: 4.6 MMOL/L (ref 3.5–5.2)
SODIUM SERPL-SCNC: 136 MMOL/L (ref 136–145)

## 2022-03-15 PROCEDURE — 36415 COLL VENOUS BLD VENIPUNCTURE: CPT

## 2022-03-15 PROCEDURE — 80048 BASIC METABOLIC PNL TOTAL CA: CPT

## 2022-03-31 ENCOUNTER — HOSPITAL ENCOUNTER (OUTPATIENT)
Dept: ULTRASOUND IMAGING | Facility: HOSPITAL | Age: 72
Discharge: HOME | End: 2022-03-31
Admitting: INTERNAL MEDICINE

## 2022-03-31 DIAGNOSIS — N17.9 ACUTE KIDNEY INJURY: ICD-10-CM

## 2022-03-31 PROCEDURE — 76775 US EXAM ABDO BACK WALL LIM: CPT | Performed by: RADIOLOGY

## 2022-03-31 PROCEDURE — 76775 US EXAM ABDO BACK WALL LIM: CPT

## 2022-10-15 ENCOUNTER — TRANSCRIBE ORDERS (OUTPATIENT)
Dept: ADMINISTRATIVE | Facility: HOSPITAL | Age: 72
End: 2022-10-15

## 2022-10-15 ENCOUNTER — LAB (OUTPATIENT)
Dept: LAB | Facility: HOSPITAL | Age: 72
End: 2022-10-15

## 2022-10-15 DIAGNOSIS — N17.9 ACUTE RENAL FAILURE, UNSPECIFIED ACUTE RENAL FAILURE TYPE: ICD-10-CM

## 2022-10-15 DIAGNOSIS — N17.9 ACUTE RENAL FAILURE, UNSPECIFIED ACUTE RENAL FAILURE TYPE: Primary | ICD-10-CM

## 2022-10-15 PROCEDURE — 80053 COMPREHEN METABOLIC PANEL: CPT

## 2022-10-15 PROCEDURE — 36415 COLL VENOUS BLD VENIPUNCTURE: CPT

## 2022-10-15 PROCEDURE — 82043 UR ALBUMIN QUANTITATIVE: CPT

## 2022-10-15 PROCEDURE — 84156 ASSAY OF PROTEIN URINE: CPT

## 2022-10-15 PROCEDURE — 81001 URINALYSIS AUTO W/SCOPE: CPT

## 2022-10-15 PROCEDURE — 82570 ASSAY OF URINE CREATININE: CPT

## 2022-10-16 LAB
ALBUMIN SERPL-MCNC: 4.1 G/DL (ref 3.5–5.2)
ALBUMIN UR-MCNC: <1.2 MG/DL
ALBUMIN/GLOB SERPL: 1.7 G/DL
ALP SERPL-CCNC: 54 U/L (ref 39–117)
ALT SERPL W P-5'-P-CCNC: 20 U/L (ref 1–41)
ANION GAP SERPL CALCULATED.3IONS-SCNC: 10.3 MMOL/L (ref 5–15)
AST SERPL-CCNC: 24 U/L (ref 1–40)
BACTERIA UR QL AUTO: NORMAL /HPF
BILIRUB SERPL-MCNC: 0.3 MG/DL (ref 0–1.2)
BILIRUB UR QL STRIP: NEGATIVE
BUN SERPL-MCNC: 22 MG/DL (ref 8–23)
BUN/CREAT SERPL: 18.2 (ref 7–25)
CALCIUM SPEC-SCNC: 9.3 MG/DL (ref 8.6–10.5)
CHLORIDE SERPL-SCNC: 101 MMOL/L (ref 98–107)
CLARITY UR: CLEAR
CO2 SERPL-SCNC: 24.7 MMOL/L (ref 22–29)
COLOR UR: YELLOW
CREAT SERPL-MCNC: 1.21 MG/DL (ref 0.76–1.27)
CREAT UR-MCNC: 48.5 MG/DL
CREAT UR-MCNC: 48.5 MG/DL
EGFRCR SERPLBLD CKD-EPI 2021: 64 ML/MIN/1.73
GLOBULIN UR ELPH-MCNC: 2.4 GM/DL
GLUCOSE SERPL-MCNC: 283 MG/DL (ref 65–99)
GLUCOSE UR STRIP-MCNC: ABNORMAL MG/DL
HGB UR QL STRIP.AUTO: NEGATIVE
HYALINE CASTS UR QL AUTO: NORMAL /LPF
KETONES UR QL STRIP: NEGATIVE
LEUKOCYTE ESTERASE UR QL STRIP.AUTO: NEGATIVE
MICROALBUMIN/CREAT UR: NORMAL MG/G{CREAT}
NITRITE UR QL STRIP: NEGATIVE
PH UR STRIP.AUTO: 7 [PH] (ref 5–8)
POTASSIUM SERPL-SCNC: 5 MMOL/L (ref 3.5–5.2)
PROT ?TM UR-MCNC: 6.4 MG/DL
PROT SERPL-MCNC: 6.5 G/DL (ref 6–8.5)
PROT UR QL STRIP: NEGATIVE
PROT/CREAT UR: 132 MG/G CREA (ref 0–200)
RBC # UR STRIP: NORMAL /HPF
REF LAB TEST METHOD: NORMAL
SODIUM SERPL-SCNC: 136 MMOL/L (ref 136–145)
SP GR UR STRIP: 1.02 (ref 1–1.03)
SQUAMOUS #/AREA URNS HPF: NORMAL /HPF
UROBILINOGEN UR QL STRIP: ABNORMAL
WBC # UR STRIP: NORMAL /HPF

## 2023-03-30 ENCOUNTER — TRANSCRIBE ORDERS (OUTPATIENT)
Dept: ADMINISTRATIVE | Facility: HOSPITAL | Age: 73
End: 2023-03-30
Payer: MEDICARE

## 2023-03-30 ENCOUNTER — HOSPITAL ENCOUNTER (OUTPATIENT)
Dept: GENERAL RADIOLOGY | Facility: HOSPITAL | Age: 73
Discharge: HOME | End: 2023-03-30
Admitting: NURSE PRACTITIONER
Payer: MEDICARE

## 2023-03-30 DIAGNOSIS — M79.601 BILATERAL ARM PAIN: ICD-10-CM

## 2023-03-30 DIAGNOSIS — M79.601 BILATERAL ARM PAIN: Primary | ICD-10-CM

## 2023-03-30 DIAGNOSIS — M79.602 BILATERAL ARM PAIN: ICD-10-CM

## 2023-03-30 DIAGNOSIS — M79.602 BILATERAL ARM PAIN: Primary | ICD-10-CM

## 2023-03-30 PROCEDURE — 73030 X-RAY EXAM OF SHOULDER: CPT

## 2023-03-30 PROCEDURE — 73060 X-RAY EXAM OF HUMERUS: CPT | Performed by: RADIOLOGY

## 2023-03-30 PROCEDURE — 73060 X-RAY EXAM OF HUMERUS: CPT

## 2023-04-07 ENCOUNTER — TRANSCRIBE ORDERS (OUTPATIENT)
Dept: ADMINISTRATIVE | Facility: HOSPITAL | Age: 73
End: 2023-04-07
Payer: MEDICARE

## 2023-04-07 DIAGNOSIS — M79.602 LEFT ARM PAIN: ICD-10-CM

## 2023-04-07 DIAGNOSIS — M79.601 RIGHT ARM PAIN: Primary | ICD-10-CM

## 2023-05-10 ENCOUNTER — HOSPITAL ENCOUNTER (OUTPATIENT)
Dept: MRI IMAGING | Facility: HOSPITAL | Age: 73
Discharge: HOME | End: 2023-05-10
Payer: MEDICARE

## 2023-05-10 DIAGNOSIS — M79.602 LEFT ARM PAIN: ICD-10-CM

## 2023-05-10 DIAGNOSIS — M79.601 RIGHT ARM PAIN: ICD-10-CM

## 2023-05-10 PROCEDURE — 73221 MRI JOINT UPR EXTREM W/O DYE: CPT | Performed by: RADIOLOGY

## 2023-05-10 PROCEDURE — 73218 MRI UPPER EXTREMITY W/O DYE: CPT

## 2023-05-10 PROCEDURE — 73221 MRI JOINT UPR EXTREM W/O DYE: CPT

## 2023-05-24 ENCOUNTER — OFFICE VISIT (OUTPATIENT)
Dept: ORTHOPEDIC SURGERY | Facility: CLINIC | Age: 73
End: 2023-05-24
Payer: MEDICARE

## 2023-05-24 VITALS
WEIGHT: 210.1 LBS | DIASTOLIC BLOOD PRESSURE: 85 MMHG | HEART RATE: 72 BPM | BODY MASS INDEX: 29.41 KG/M2 | HEIGHT: 71 IN | SYSTOLIC BLOOD PRESSURE: 143 MMHG

## 2023-05-24 DIAGNOSIS — M75.42 IMPINGEMENT SYNDROME OF BOTH SHOULDERS: Primary | ICD-10-CM

## 2023-05-24 DIAGNOSIS — M75.41 IMPINGEMENT SYNDROME OF BOTH SHOULDERS: Primary | ICD-10-CM

## 2023-05-24 RX ORDER — DULOXETIN HYDROCHLORIDE 30 MG/1
30 CAPSULE, DELAYED RELEASE ORAL DAILY
COMMUNITY

## 2023-05-24 RX ORDER — VALSARTAN 40 MG/1
40 TABLET ORAL DAILY
COMMUNITY

## 2023-05-24 RX ORDER — LIDOCAINE HYDROCHLORIDE 10 MG/ML
5 INJECTION, SOLUTION EPIDURAL; INFILTRATION; INTRACAUDAL; PERINEURAL
Status: COMPLETED | OUTPATIENT
Start: 2023-05-24 | End: 2023-05-24

## 2023-05-24 RX ORDER — FENOFIBRATE 134 MG/1
CAPSULE ORAL
COMMUNITY
Start: 2023-05-23

## 2023-05-24 RX ORDER — METHYLPREDNISOLONE ACETATE 80 MG/ML
80 INJECTION, SUSPENSION INTRA-ARTICULAR; INTRALESIONAL; INTRAMUSCULAR; SOFT TISSUE
Status: COMPLETED | OUTPATIENT
Start: 2023-05-24 | End: 2023-05-24

## 2023-05-24 RX ADMIN — LIDOCAINE HYDROCHLORIDE 5 ML: 10 INJECTION, SOLUTION EPIDURAL; INFILTRATION; INTRACAUDAL; PERINEURAL at 15:13

## 2023-05-24 RX ADMIN — METHYLPREDNISOLONE ACETATE 80 MG: 80 INJECTION, SUSPENSION INTRA-ARTICULAR; INTRALESIONAL; INTRAMUSCULAR; SOFT TISSUE at 15:13

## 2023-05-24 NOTE — PROGRESS NOTES
[facility] [place] [facility] [place]          Patient: Hector Johnston Jr.  YOB: 1950  Date of Encounter: 05/24/2023  PCP: Anastacia Naidu APRN      Subjective     Chief Complaint   Patient presents with   • Right Shoulder - Pain, Initial Evaluation   • Left Shoulder - Pain, Initial Evaluation           History of Present Illness:     Hector Johnston Jr. is a 73 y.o. male presents today Shoulder Pain: Patient complaints of bilateral shoulder pain. This is evaluated as a non injury. The pain is described as aching, shooting, stabbing and throbbing.  The onset of the pain was gradual, starting about 1 year ago.  The pain occurs every day, when active and at night/ wakes from sleep at night.  Location is anterior, lateral. No history of dislocation. Symptoms are aggravated by reaching, lifting, pulling, throwing, repetitive use, dressing self. Symptoms are diminished by  rest, avoiding the painful activities.   Limited activities include: reaching, lifting, pulling, repetitive use. mild stiffness, mild weakness, swelling noted bilateral lateral is reported. 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             Patient Active Problem List   Diagnosis   • Hypogonadism in male   • Corporo-venous occlusive erectile dysfunction   • Abnormal cardiovascular stress test     Past Medical History:   Diagnosis Date   • Diabetes mellitus    • Hypertension      Past Surgical History:   Procedure Laterality Date   • CARDIAC CATHETERIZATION N/A 5/4/2018    Procedure: Left Heart Cath;  Surgeon: Jose Dickerson MD;  Location: [facility] INVASIVE LOCATION;  Service: Cardiovascular   • CHOLECYSTECTOMY     • MOUTH SURGERY       Social History     Occupational History   • Not on file   Tobacco Use   • Smoking status: Former   • Smokeless tobacco: Never   Vaping Use   • Vaping Use: Never used   Substance and Sexual Activity   • Alcohol use: No   • Drug use: No   • Sexual  activity: Yes     Partners: Female    Hector Johnston Jr.  reports that he has quit smoking. He has never used smokeless tobacco.. I have educated him on the risk of diseases from using tobacco products such as cancer, COPD and heart disease.          Social History     Social History Narrative   • Not on file     Family History   Problem Relation Age of Onset   • No Known Problems Father    • No Known Problems Mother      Current Outpatient Medications   Medication Sig Dispense Refill   • aspirin 81 MG EC tablet Take 1 tablet by mouth Every Morning.     • atorvastatin (LIPITOR) 10 MG tablet Take 1 tablet by mouth Every Night.     • Blood Glucose Monitoring Suppl (TRUE METRIX METER) w/Device kit Use to test blood sugar 3 times a day and as needed 1 kit 0   • brimonidine (ALPHAGAN P) 0.1 % solution ophthalmic solution Every 8 (Eight) Hours.     • DULoxetine (CYMBALTA) 30 MG capsule Take 1 capsule by mouth Daily.     • fenofibrate micronized (LOFIBRA) 134 MG capsule      • glimepiride (AMARYL) 4 MG tablet Take 1 tablet by mouth 2 (Two) Times a Day. 180 tablet 3   • glucose blood test strip Use to test blood sugar 3 times a day & as needed 100 each 5   • ibuprofen (ADVIL,MOTRIN) 800 MG tablet Take 1 tablet by mouth Every 6 to 8 Hours As Needed with food. 120 tablet 3   • latanoprost (XALATAN) 0.005 % ophthalmic solution Apply 1 drop to left eye as directed by provider Every Night. 2.5 mL 9   • loratadine (CLARITIN) 10 MG tablet Take 1 tablet by mouth Every Morning. 30 tablet 3   • metFORMIN (GLUCOPHAGE) 850 MG tablet Take 1 tablet by mouth 2 (Two) Times a Day with food. 120 tablet 3   • Multiple Vitamins-Minerals (MULTIVITAMIN ADULT PO) Take 1 tablet by mouth Every Morning.     • Syringe, Disposable, 3 ML misc Use 3 ml syringe with a 25 gauge  5/8 inch needle 24 each 6   • timolol (TIMOPTIC) 0.5 % ophthalmic solution Administer 1 drop to both eyes 3 (Three) Times a Day. 30 mL 3   • TRUEPLUS LANCETS 33G misc Use to test  blood sugar 3 times a day & as needed 100 each 5   • valsartan (DIOVAN) 40 MG tablet Take 1 tablet by mouth Daily.     • amitriptyline (ELAVIL) 10 MG tablet Take 1 tablet by mouth every night at bedtime. (Patient not taking: Reported on 5/24/2023) 30 tablet 3   • amitriptyline (ELAVIL) 10 MG tablet Take 1 to 2 tablets by mouth Every Night at bedtime (Patient not taking: Reported on 5/24/2023) 60 tablet 3   • azithromycin (ZITHROMAX) 500 MG tablet Take 1 tablet by mouth Daily. 5 tablet 0   • Cholecalciferol 50 MCG (2000 UT) tablet Take 1 tablet by mouth Daily. (Patient not taking: Reported on 5/24/2023) 90 each 3   • glimepiride (AMARYL) 4 MG tablet Take 1 tablet by mouth Every Morning Before Breakfast.     • glucose blood test strip Use as directed. 50 each 6   • ibuprofen (ADVIL,MOTRIN) 800 MG tablet Take 1 tablet by mouth every 6-8 hours as needed with food. 120 tablet 3   • linagliptin (TRADJENTA) 5 MG tablet tablet Take 1 tablet by mouth Daily. (Patient not taking: Reported on 5/24/2023) 30 tablet 5   • linagliptin (TRADJENTA) 5 MG tablet tablet Take 1 tablet by mouth Daily. 30 tablet 3   • metFORMIN (GLUCOPHAGE) 850 MG tablet Take 500 mg by mouth 2 (Two) Times a Day With Meals.     • metFORMIN (GLUCOPHAGE) 850 MG tablet Take 1 tablet by mouth 2 (Two) Times a Day with food. 120 tablet 3   • sildenafil (REVATIO) 20 MG tablet 1-5 by mouth one hour prior to intercourse on an empty stomach (Patient not taking: Reported on 5/24/2023) 24 tablet 11   • timolol (BETIMOL) 0.25 % ophthalmic solution Instill 1 drop into both eyes twice a day 5 mL 6   • timolol (TIMOPTIC) 0.5 % ophthalmic solution Administer 1 drop to both eyes 2 (Two) Times a Day.     • timolol (TIMOPTIC) 0.5 % ophthalmic solution Instill 1 drop into both eyes twice a day 30 mL 3     No current facility-administered medications for this visit.     No Known Allergies         Review of Systems   Constitutional: Negative.   HENT: Negative.    Eyes: Positive  "for pain.   Cardiovascular: Negative.    Respiratory: Negative.    Endocrine: Negative.    Hematologic/Lymphatic: Negative.    Skin: Negative.    Musculoskeletal: Positive for joint pain.        Pertinent positives listed in HPI   Gastrointestinal: Negative.    Genitourinary: Negative.    Neurological: Negative.    Psychiatric/Behavioral: Negative.    Allergic/Immunologic: Negative.          Objective      Vitals:    05/24/23 1413   BP: 143/85   Pulse: 72   Weight: 95.3 kg (210 lb 1.6 oz)   Height: 180.3 cm (70.98\")      BMI is >= 25 and <30. (Overweight) The following options were offered after discussion;: exercise counseling/recommendations and nutrition counseling/recommendations      Physical Exam  Vitals and nursing note reviewed.   Constitutional:       General: He is not in acute distress.     Appearance: Normal appearance. He is not ill-appearing.   HENT:      Head: Normocephalic and atraumatic.      Right Ear: External ear normal.      Left Ear: External ear normal.      Nose: Nose normal.      Mouth/Throat:      Mouth: Mucous membranes are moist.      Pharynx: Oropharynx is clear.   Eyes:      Extraocular Movements: Extraocular movements intact.      Conjunctiva/sclera: Conjunctivae normal.      Pupils: Pupils are equal, round, and reactive to light.   Cardiovascular:      Rate and Rhythm: Normal rate.      Pulses: Normal pulses.   Pulmonary:      Effort: Pulmonary effort is normal.   Abdominal:      General: There is no distension.   Musculoskeletal:      Cervical back: Normal range of motion. No rigidity.   Skin:     General: Skin is warm and dry.      Capillary Refill: Capillary refill takes less than 2 seconds.   Neurological:      General: No focal deficit present.      Mental Status: He is alert and oriented to person, place, and time.   Psychiatric:         Mood and Affect: Mood normal.         Behavior: Behavior normal.     Right Shoulder Exam     Tenderness   The patient is experiencing " tenderness in the acromion.    Range of Motion   Active abduction: 90   External rotation: normal   Forward flexion: 110   Internal rotation 0 degrees: Lumbar     Muscle Strength   Abduction: 5/5   Internal rotation: 5/5   External rotation: 5/5   Supraspinatus: 5/5   Subscapularis: 5/5   Biceps: 5/5     Tests   Mays test: positive  Impingement: positive  Drop arm: negative  Sulcus: absent    Other   Erythema: absent  Pulse: present      Left Shoulder Exam     Tenderness   The patient is experiencing tenderness in the acromion.    Range of Motion   Active abduction: 90   External rotation: normal   Forward flexion: 110   Internal rotation 0 degrees: Lumbar     Muscle Strength   Abduction: 5/5   Internal rotation: 5/5   External rotation: 5/5   Supraspinatus: 5/5   Subscapularis: 5/5   Biceps: 5/5     Tests   Mays test: positive  Cross arm: negative  Impingement: positive  Drop arm: negative  Sulcus: present    Other   Erythema: absent  Pulse: present                       Radiology:      MRI Humerus Left Without Contrast    Result Date: 5/11/2023    No acute findings in the left humerus.  This report was finalized on 5/11/2023 9:00 AM by Dr. Sebastian Tolliver MD.      MRI Humerus Right Without Contrast    Result Date: 5/11/2023    No acute findings in the right humerus.  This report was finalized on 5/11/2023 8:59 AM by Dr. Sebastian Tolliver MD.      MRI Shoulder Right Without Contrast    Result Date: 5/11/2023    Mild distal supraspinatus tendinosis.  This report was finalized on 5/11/2023 8:22 AM by Dr. Sebastian Tolliver MD.      MRI Shoulder Left Without Contrast    Result Date: 5/11/2023  1.  Distal supraspinatus tendinosis with probably mild articular surface partial tearing. 2.  Glenohumeral joint degenerative change with glenoid labrum superiorly degeneration. 3.  Acromioclavicular joint degenerative change.  This report was finalized on 5/11/2023 8:50 AM by Dr. Sebastian Tolliver MD.      XR Humerus 2 View  Bilateral    Result Date: 3/31/2023  1.  Subcortical lucency distal right humerus without periosteal reaction or associated fracture. If pain at this location, consider CT or MRI to further evaluate. 2.  No acute fracture or dislocation identified. Otherwise unremarkable exam.  This report was finalized on 3/31/2023 8:03 AM by Dr. Mark Chu MD.      XR Shoulder 2+ View Bilateral    Result Date: 3/31/2023    Mild-moderate osteoarthritis of the left greater than right shoulders.  This report was finalized on 3/31/2023 8:06 AM by Dr. Mark Chu MD.              Assessment/Plan        ICD-10-CM ICD-9-CM   1. Impingement syndrome of both shoulders  M75.41 726.2    M75.42      73-year-old male with 1 year history of progressive onset worsening bilateral shoulder impingement with MRI evidence of bursal sided rotator cuff tendinosis and impingement syndrome.  No direct surgical indication.  As result of the notable findings on MRI and exam the patient was provided today with a subacromial injection right shoulder with 80 mg Depo-Medrol and lidocaine block.  Patient tolerated this procedure well.  He was instructed to return in 2 weeks to which we discussed left shoulder injection as the patient is insulin-dependent diabetic and we wanted to refrain from bilateral injections today.        Large Joint Arthrocentesis: R subacromial bursa  Date/Time: 5/24/2023 3:13 PM  Consent given by: patient  Site marked: site marked  Supporting Documentation  Indications: pain and diagnostic evaluation   Procedure Details  Location: shoulder - R subacromial bursa  Needle size: 25 G  Approach: lateral  Medications administered: 80 mg methylPREDNISolone acetate 80 MG/ML; 5 mL lidocaine PF 1% 1 %  Patient tolerance: patient tolerated the procedure well with no immediate complications                      This document was signed by Petr Lim PA-C May 24, 2023     CC: MondayAnastacia APRN      Dictated Utilizing Ivone  Dictation:   Please note that portions of this note were completed with a voice recognition program.   Part of this note may be an electronic transcription/translation of spoken language to printed text using the Dragon Dictation System.

## 2023-07-11 ENCOUNTER — OFFICE VISIT (OUTPATIENT)
Dept: ORTHOPEDIC SURGERY | Facility: CLINIC | Age: 73
End: 2023-07-11
Payer: MEDICARE

## 2023-07-11 VITALS — BODY MASS INDEX: 29.4 KG/M2 | WEIGHT: 210 LBS | HEIGHT: 71 IN

## 2023-07-11 DIAGNOSIS — M75.41 IMPINGEMENT SYNDROME OF BOTH SHOULDERS: Primary | ICD-10-CM

## 2023-07-11 DIAGNOSIS — M75.42 IMPINGEMENT SYNDROME OF BOTH SHOULDERS: Primary | ICD-10-CM

## 2023-07-11 PROCEDURE — 99213 OFFICE O/P EST LOW 20 MIN: CPT | Performed by: PHYSICIAN ASSISTANT

## 2023-07-11 NOTE — PROGRESS NOTES
[facility] Establ[facility] Patient Visit          Patient: Hector Johnston Jr.  YOB: 1950  Date of Encounter: 7/11/2023  PCP: Anastacia Naidu APRN      Subjective     Chief Complaint   Patient presents with    Left Shoulder - Pain    Right Shoulder - Pain           History of Present Illness:     Hector Johnston Jr. is a 73 y.o. male presents today for follow-up evaluation bilateral shoulder pain and impingement.  The patient received intra-articular steroid injection left shoulder last office visit with resolution of pain and symptoms.  He reports improving overall daily activity and range of motion and decreased difficulty upon overhead motion and activity.  He reports decreasing pain upon reaching pushing and pulling.  Patient has continued with his current occupation and modifying activity with no significant complication or worsening of pain/symptoms.          Patient Active Problem List   Diagnosis    Hypogonadism in male    Corporo-venous occlusive erectile dysfunction    Abnormal cardiovascular stress test     Past Medical History:   Diagnosis Date    Diabetes mellitus     Hypertension      Past Surgical History:   Procedure Laterality Date    CARDIAC CATHETERIZATION N/A 5/4/2018    Procedure: Left Heart Cath;  Surgeon: Jose Dickerson MD;  Location: [facility] INVASIVE LOCATION;  Service: Cardiovascular    CHOLECYSTECTOMY      MOUTH SURGERY       Social History     Occupational History    Not on file   Tobacco Use    Smoking status: Former    Smokeless tobacco: Never   Vaping Use    Vaping Use: Never used   Substance and Sexual Activity    Alcohol use: No    Drug use: No    Sexual activity: Yes     Partners: Female    Hector Johnston Jr.  reports that he has quit smoking. He has never used smokeless tobacco.. I have educated him on the risk of diseases from using tobacco products such as cancer, COPD and heart disease.          Social History     Social History Narrative     Not on file     Family History   Problem Relation Age of Onset    No Known Problems Father     No Known Problems Mother      Current Outpatient Medications   Medication Sig Dispense Refill    amitriptyline (ELAVIL) 10 MG tablet Take 1 to 2 tablets by mouth Every Night at bedtime 60 tablet 3    aspirin 81 MG EC tablet Take 1 tablet by mouth Every Morning.      atorvastatin (LIPITOR) 10 MG tablet Take 1 tablet by mouth Every Night.      azithromycin (ZITHROMAX) 500 MG tablet Take 1 tablet by mouth Daily. 5 tablet 0    Blood Glucose Monitoring Suppl (TRUE METRIX METER) w/Device kit Use to test blood sugar 3 times a day and as needed 1 kit 0    brimonidine (ALPHAGAN P) 0.1 % solution ophthalmic solution Every 8 (Eight) Hours.      Cholecalciferol 50 MCG (2000 UT) tablet Take 1 tablet by mouth Daily. 90 each 3    DULoxetine (CYMBALTA) 30 MG capsule Take 1 capsule by mouth Daily.      fenofibrate micronized (LOFIBRA) 134 MG capsule       glimepiride (AMARYL) 4 MG tablet Take 1 tablet by mouth 2 (Two) Times a Day. 180 tablet 3    glucose blood test strip Use to test blood sugar 3 times a day & as needed 100 each 5    ibuprofen (ADVIL,MOTRIN) 800 MG tablet Take 1 tablet by mouth Every 6 to 8 Hours As Needed with food. 120 tablet 3    latanoprost (XALATAN) 0.005 % ophthalmic solution Apply 1 drop to left eye as directed by provider Every Night. 2.5 mL 9    linagliptin (TRADJENTA) 5 MG tablet tablet Take 1 tablet by mouth Daily. 30 tablet 5    loratadine (CLARITIN) 10 MG tablet Take 1 tablet by mouth Every Morning. 30 tablet 3    metFORMIN (GLUCOPHAGE) 850 MG tablet Take 500 mg by mouth 2 (Two) Times a Day With Meals.      metFORMIN (GLUCOPHAGE) 850 MG tablet Take 1 tablet by mouth 2 (Two) Times a Day with food. 120 tablet 3    Multiple Vitamins-Minerals (MULTIVITAMIN ADULT PO) Take 1 tablet by mouth Every Morning.      sildenafil (REVATIO) 20 MG tablet 1-5 by mouth one hour prior to intercourse on an empty stomach 24 tablet  "11    Syringe, Disposable, 3 ML misc Use 3 ml syringe with a 25 gauge  5/8 inch needle 24 each 6    timolol (BETIMOL) 0.25 % ophthalmic solution Instill 1 drop into both eyes twice a day 5 mL 6    timolol (TIMOPTIC) 0.5 % ophthalmic solution Instill 1 drop into both eyes twice a day 30 mL 3    timolol (TIMOPTIC) 0.5 % ophthalmic solution Administer 1 drop to both eyes 3 (Three) Times a Day. 30 mL 3    TRUEPLUS LANCETS 33G misc Use to test blood sugar 3 times a day & as needed 100 each 5    valsartan (DIOVAN) 40 MG tablet Take 1 tablet by mouth Daily.       No current facility-administered medications for this visit.     No Known Allergies         Review of Systems   Constitutional: Negative.   HENT: Negative.     Eyes:  Positive for pain.   Cardiovascular: Negative.    Respiratory: Negative.     Endocrine: Negative.    Hematologic/Lymphatic: Negative.    Skin: Negative.    Musculoskeletal:  Positive for joint pain.        Pertinent positives listed in HPI   Gastrointestinal: Negative.    Genitourinary: Negative.    Neurological: Negative.    Psychiatric/Behavioral: Negative.     Allergic/Immunologic: Negative.        Objective      Vitals:    07/11/23 1445   Weight: 95.3 kg (210 lb)   Height: 180.3 cm (70.98\")      BMI is >= 25 and <30. (Overweight) The following options were offered after discussion;: exercise counseling/recommendations and nutrition counseling/recommendations      Physical Exam  Vitals and nursing note reviewed.   Constitutional:       General: He is not in acute distress.     Appearance: Normal appearance. He is not ill-appearing.   HENT:      Head: Normocephalic and atraumatic.      Right Ear: External ear normal.      Left Ear: External ear normal.      Nose: Nose normal.      Mouth/Throat:      Mouth: Mucous membranes are moist.      Pharynx: Oropharynx is clear.   Eyes:      Extraocular Movements: Extraocular movements intact.      Conjunctiva/sclera: Conjunctivae normal.      Pupils: Pupils " are equal, round, and reactive to light.   Cardiovascular:      Rate and Rhythm: Normal rate.      Pulses: Normal pulses.   Pulmonary:      Effort: Pulmonary effort is normal.   Abdominal:      General: There is no distension.   Musculoskeletal:      Cervical back: Normal range of motion. No rigidity.   Skin:     General: Skin is warm and dry.      Capillary Refill: Capillary refill takes less than 2 seconds.   Neurological:      General: No focal deficit present.      Mental Status: He is alert and oriented to person, place, and time.   Psychiatric:         Mood and Affect: Mood normal.         Behavior: Behavior normal.   Right Shoulder Exam     Tenderness   The patient is experiencing tenderness in the acromion.    Range of Motion   Active abduction:  120 normal   External rotation:  normal   Forward flexion:  130 normal   Internal rotation 0 degrees:  Lumbar     Muscle Strength   Abduction: 5/5   Internal rotation: 5/5   External rotation: 5/5   Supraspinatus: 5/5   Subscapularis: 5/5   Biceps: 5/5     Tests   Mays test: negative  Impingement: negative  Drop arm: negative  Sulcus: absent    Other   Erythema: absent  Pulse: present      Left Shoulder Exam     Tenderness   The patient is experiencing tenderness in the acromion.    Range of Motion   Active abduction:  120 normal   External rotation:  normal   Forward flexion:  130 normal   Internal rotation 0 degrees:  Lumbar     Muscle Strength   Abduction: 5/5   Internal rotation: 5/5   External rotation: 5/5   Supraspinatus: 5/5   Subscapularis: 5/5   Biceps: 5/5     Tests   Mays test: negative  Cross arm: negative  Impingement: negative  Drop arm: negative  Sulcus: absent    Other   Erythema: absent  Pulse: present             Radiology:      MRI Humerus Left Without Contrast    Result Date: 5/11/2023    No acute findings in the left humerus.  This report was finalized on 5/11/2023 9:00 AM by Dr. Sebastian Tolliver MD.      MRI Humerus Right Without  Contrast    Result Date: 5/11/2023    No acute findings in the right humerus.  This report was finalized on 5/11/2023 8:59 AM by Dr. Sebastian Tolliver MD.      MRI Shoulder Right Without Contrast    Result Date: 5/11/2023    Mild distal supraspinatus tendinosis.  This report was finalized on 5/11/2023 8:22 AM by Dr. Sebastian Tolliver MD.      MRI Shoulder Left Without Contrast    Result Date: 5/11/2023  1.  Distal supraspinatus tendinosis with probably mild articular surface partial tearing. 2.  Glenohumeral joint degenerative change with glenoid labrum superiorly degeneration. 3.  Acromioclavicular joint degenerative change.  This report was finalized on 5/11/2023 8:50 AM by Dr. Sebastian Tolliver MD.               Assessment/Plan        ICD-10-CM ICD-9-CM   1. Impingement syndrome of both shoulders  M75.41 726.2    M75.42      73-year-old male with notable 1 year history of progressive onset worsening bilateral shoulder impingement with previous MRI evidence of bursal sided rotator cuff tendinosis and impingement syndrome left shoulder.  The patient has responded with the most recent subacromial injection left shoulder.  Secondary to the alleviation and continuation of improvement of pain symptoms with no new findings the patient was encouraged to continue to implement progressive range of motion activity as pain and swelling as his guide.  No direct surgical indication.  The patient will return back on an as-needed basis upon any further complication or worsening of pain/symptoms.                  This document was signed by Petr Lim PA-C July 11, 2023    CC: Anastacia Naidu APRN      Dictated Utilizing Dragon Dictation:   Please note that portions of this note were completed with a voice recognition program.   Part of this note may be an electronic transcription/translation of spoken language to printed text using the Dragon Dictation System.

## 2023-10-06 ENCOUNTER — OFFICE VISIT (OUTPATIENT)
Dept: ORTHOPEDIC SURGERY | Facility: CLINIC | Age: 73
End: 2023-10-06
Payer: MEDICARE

## 2023-10-06 VITALS — BODY MASS INDEX: 29.4 KG/M2 | HEIGHT: 71 IN | WEIGHT: 210 LBS

## 2023-10-06 DIAGNOSIS — M75.42 IMPINGEMENT SYNDROME OF BOTH SHOULDERS: Primary | ICD-10-CM

## 2023-10-06 DIAGNOSIS — M75.41 IMPINGEMENT SYNDROME OF BOTH SHOULDERS: Primary | ICD-10-CM

## 2023-10-06 RX ORDER — INSULIN GLARGINE 100 [IU]/ML
INJECTION, SOLUTION SUBCUTANEOUS
COMMUNITY
Start: 2023-08-16

## 2023-10-06 RX ORDER — GABAPENTIN 100 MG/1
CAPSULE ORAL
COMMUNITY
Start: 2023-09-27

## 2023-10-06 RX ADMIN — METHYLPREDNISOLONE ACETATE 80 MG: 80 INJECTION, SUSPENSION INTRA-ARTICULAR; INTRALESIONAL; INTRAMUSCULAR; SOFT TISSUE at 11:50

## 2023-10-06 RX ADMIN — LIDOCAINE HYDROCHLORIDE 5 ML: 10 INJECTION, SOLUTION EPIDURAL; INFILTRATION; INTRACAUDAL; PERINEURAL at 11:50

## 2023-10-19 RX ORDER — LIDOCAINE HYDROCHLORIDE 10 MG/ML
5 INJECTION, SOLUTION EPIDURAL; INFILTRATION; INTRACAUDAL; PERINEURAL
Status: COMPLETED | OUTPATIENT
Start: 2023-10-06 | End: 2023-10-06

## 2023-10-19 RX ORDER — METHYLPREDNISOLONE ACETATE 80 MG/ML
80 INJECTION, SUSPENSION INTRA-ARTICULAR; INTRALESIONAL; INTRAMUSCULAR; SOFT TISSUE
Status: COMPLETED | OUTPATIENT
Start: 2023-10-06 | End: 2023-10-06

## 2023-10-19 NOTE — PROGRESS NOTES
[facility] [place] [facility] Established Patient Visit          Patient: Hector Johnston Jr.  YOB: 1950  Date of Encounter: 10/6/2023  PCP: Anastacia Naidu APRN      Subjective     Chief Complaint   Patient presents with    Right Shoulder - Follow-up, Pain    Left Shoulder - Follow-up, Pain           History of Present Illness:     Hector Johnston Jr. is a 73 y.o. male presents today for follow-up evaluation bilateral shoulder pain and impingement.  The patient received intra-articular steroid injection left shoulder previous office visit several months ago with resolution of pain and symptoms.  He reports improved overall daily activity and range of motion and decreased difficulty upon overhead motion and activity.  He reports very early return of symptoms and progressive pain upon reaching pushing and pulling.  Patient has continued with his current occupation and modifying activity         Patient Active Problem List   Diagnosis    Hypogonadism in male    Corporo-venous occlusive erectile dysfunction    Abnormal cardiovascular stress test     Past Medical History:   Diagnosis Date    Diabetes mellitus     Hypertension      Past Surgical History:   Procedure Laterality Date    CARDIAC CATHETERIZATION N/A 5/4/2018    Procedure: Left Heart Cath;  Surgeon: Jose Dickerson MD;  Location: [facility] INVASIVE LOCATION;  Service: Cardiovascular    CHOLECYSTECTOMY      MOUTH SURGERY       Social History     Occupational History    Not on file   Tobacco Use    Smoking status: Former    Smokeless tobacco: Never   Vaping Use    Vaping Use: Never used   Substance and Sexual Activity    Alcohol use: No    Drug use: No    Sexual activity: Yes     Partners: Female    Hector Johnston Jr.  reports that he has quit smoking. He has never used smokeless tobacco.. I have educated him on the risk of diseases from using tobacco products such as cancer, COPD and heart disease.          Social History     Social History  Narrative    Not on file     Family History   Problem Relation Age of Onset    No Known Problems Father     No Known Problems Mother      Current Outpatient Medications   Medication Sig Dispense Refill    amitriptyline (ELAVIL) 10 MG tablet Take 1 to 2 tablets by mouth Every Night at bedtime 60 tablet 3    aspirin 81 MG EC tablet Take 1 tablet by mouth Every Morning.      atorvastatin (LIPITOR) 10 MG tablet Take 1 tablet by mouth Every Night.      Blood Glucose Monitoring Suppl (TRUE METRIX METER) w/Device kit Use to test blood sugar 3 times a day and as needed 1 kit 0    brimonidine (ALPHAGAN P) 0.1 % solution ophthalmic solution Every 8 (Eight) Hours.      Cholecalciferol 50 MCG (2000 UT) tablet Take 1 tablet by mouth Daily. 90 each 3    DULoxetine (CYMBALTA) 30 MG capsule Take 1 capsule by mouth Daily.      fenofibrate micronized (LOFIBRA) 134 MG capsule       gabapentin (NEURONTIN) 100 MG capsule       glimepiride (AMARYL) 4 MG tablet Take 1 tablet by mouth 2 (Two) Times a Day. 180 tablet 3    glucose blood test strip Use to test blood sugar 3 times a day & as needed 100 each 5    ibuprofen (ADVIL,MOTRIN) 800 MG tablet Take 1 tablet by mouth Every 6 to 8 Hours As Needed with food. 120 tablet 3    Lantus SoloStar 100 UNIT/ML injection pen       latanoprost (XALATAN) 0.005 % ophthalmic solution Apply 1 drop to left eye as directed by provider Every Night. 2.5 mL 9    linagliptin (TRADJENTA) 5 MG tablet tablet Take 1 tablet by mouth Daily. 30 tablet 5    loratadine (CLARITIN) 10 MG tablet Take 1 tablet by mouth Every Morning. 30 tablet 3    metFORMIN (GLUCOPHAGE) 850 MG tablet Take 500 mg by mouth 2 (Two) Times a Day With Meals.      metFORMIN (GLUCOPHAGE) 850 MG tablet Take 1 tablet by mouth 2 (Two) Times a Day with food. 120 tablet 3    Multiple Vitamins-Minerals (MULTIVITAMIN ADULT PO) Take 1 tablet by mouth Every Morning.      sildenafil (REVATIO) 20 MG tablet 1-5 by mouth one hour prior to intercourse on an  "empty stomach 24 tablet 11    Syringe, Disposable, 3 ML misc Use 3 ml syringe with a 25 gauge  5/8 inch needle 24 each 6    timolol (BETIMOL) 0.25 % ophthalmic solution Instill 1 drop into both eyes twice a day 5 mL 6    timolol (TIMOPTIC) 0.5 % ophthalmic solution Instill 1 drop into both eyes twice a day 30 mL 3    timolol (TIMOPTIC) 0.5 % ophthalmic solution Administer 1 drop to both eyes 3 (Three) Times a Day. 30 mL 3    TRUEPLUS LANCETS 33G misc Use to test blood sugar 3 times a day & as needed 100 each 5    valsartan (DIOVAN) 40 MG tablet Take 1 tablet by mouth Daily.      azithromycin (ZITHROMAX) 500 MG tablet Take 1 tablet by mouth Daily. (Patient not taking: Reported on 10/6/2023) 5 tablet 0     No current facility-administered medications for this visit.     No Known Allergies         Review of Systems   Constitutional: Negative.   HENT: Negative.     Eyes:  Positive for pain.   Cardiovascular: Negative.    Respiratory: Negative.     Endocrine: Negative.    Hematologic/Lymphatic: Negative.    Skin: Negative.    Musculoskeletal:  Positive for joint pain.        Pertinent positives listed in HPI   Gastrointestinal: Negative.    Genitourinary: Negative.    Neurological: Negative.    Psychiatric/Behavioral: Negative.     Allergic/Immunologic: Negative.          Objective      Vitals:    10/06/23 1057   Weight: 95.3 kg (210 lb)   Height: 180.3 cm (71\")      BMI is >= 25 and <30. (Overweight) The following options were offered after discussion;: exercise counseling/recommendations and nutrition counseling/recommendations      Physical Exam  Vitals and nursing note reviewed.   Constitutional:       General: He is not in acute distress.     Appearance: Normal appearance. He is not ill-appearing.   HENT:      Head: Normocephalic and atraumatic.      Right Ear: External ear normal.      Left Ear: External ear normal.      Nose: Nose normal.      Mouth/Throat:      Mouth: Mucous membranes are moist.      Pharynx: " Oropharynx is clear.   Eyes:      Extraocular Movements: Extraocular movements intact.      Conjunctiva/sclera: Conjunctivae normal.      Pupils: Pupils are equal, round, and reactive to light.   Cardiovascular:      Rate and Rhythm: Normal rate.      Pulses: Normal pulses.   Pulmonary:      Effort: Pulmonary effort is normal.   Abdominal:      General: There is no distension.   Musculoskeletal:      Cervical back: Normal range of motion. No rigidity.   Skin:     General: Skin is warm and dry.      Capillary Refill: Capillary refill takes less than 2 seconds.   Neurological:      General: No focal deficit present.      Mental Status: He is alert and oriented to person, place, and time.   Psychiatric:         Mood and Affect: Mood normal.         Behavior: Behavior normal.     Right Shoulder Exam     Tenderness   The patient is experiencing tenderness in the acromion.    Range of Motion   Active abduction:  100 abnormal   External rotation:  normal   Forward flexion:  110 abnormal   Internal rotation 0 degrees:  Lumbar     Muscle Strength   Abduction: 5/5   Internal rotation: 5/5   External rotation: 5/5   Supraspinatus: 5/5   Subscapularis: 5/5   Biceps: 5/5     Tests   Mays test: positive  Impingement: positive  Drop arm: negative  Sulcus: absent    Other   Erythema: absent  Pulse: present      Left Shoulder Exam     Tenderness   The patient is experiencing tenderness in the acromion.    Range of Motion   Active abduction:  100 abnormal   External rotation:  normal   Forward flexion:  110 abnormal   Internal rotation 0 degrees:  Lumbar     Muscle Strength   Abduction: 5/5   Internal rotation: 5/5   External rotation: 5/5   Supraspinatus: 5/5   Subscapularis: 5/5   Biceps: 5/5     Tests   Mays test: positive  Cross arm: negative  Impingement: positive  Drop arm: negative  Sulcus: absent    Other   Erythema: absent  Pulse: present               Radiology:      No radiology results for the last 90  days.            Assessment/Plan        ICD-10-CM ICD-9-CM   1. Impingement syndrome of both shoulders  M75.41 726.2    M75.42        73-year-old male with notable progressive onset worsening bilateral shoulder impingement with previous MRI evidence of bursal sided rotator cuff tendinosis and impingement syndrome left shoulder.  The patient has responded with the most recent subacromial injections left shoulder.  Secondary to return of pain symptoms the patient was provided today with repeat steroid injections left shoulder 80 mg Depo-Medrol with lidocaine block injected into the subacromial space left shoulder.  Patient tolerated this procedure well.  He was instructed to return back in 4 weeks manage we discussed the possibility of progression with right shoulder injection and further conservative treatment options.        Large Joint Arthrocentesis: L subacromial bursa  Date/Time: 10/6/2023 11:50 AM  Consent given by: patient  Site marked: site marked  Supporting Documentation  Indications: pain and diagnostic evaluation   Procedure Details  Location: shoulder - L subacromial bursa  Needle size: 25 G  Approach: lateral  Medications administered: 80 mg methylPREDNISolone acetate 80 MG/ML; 5 mL lidocaine PF 1% 1 %  Patient tolerance: patient tolerated the procedure well with no immediate complications                        This document was signed by Petr Lim PA-C October 6, 2023    CC: MondayAnastacia APRN      Dictated Utilizing Dragon Dictation:   Please note that portions of this note were completed with a voice recognition program.   Part of this note may be an electronic transcription/translation of spoken language to printed text using the Dragon Dictation System.

## 2023-11-10 ENCOUNTER — OFFICE VISIT (OUTPATIENT)
Dept: ORTHOPEDIC SURGERY | Facility: CLINIC | Age: 73
End: 2023-11-10
Payer: MEDICARE

## 2023-11-10 VITALS — WEIGHT: 210.1 LBS | BODY MASS INDEX: 29.41 KG/M2 | HEIGHT: 71 IN

## 2023-11-10 DIAGNOSIS — M75.42 IMPINGEMENT SYNDROME OF BOTH SHOULDERS: Primary | ICD-10-CM

## 2023-11-10 DIAGNOSIS — M75.41 IMPINGEMENT SYNDROME OF BOTH SHOULDERS: Primary | ICD-10-CM

## 2023-11-10 RX ADMIN — LIDOCAINE HYDROCHLORIDE 5 ML: 10 INJECTION, SOLUTION EPIDURAL; INFILTRATION; INTRACAUDAL; PERINEURAL at 10:20

## 2023-11-10 RX ADMIN — METHYLPREDNISOLONE ACETATE 80 MG: 80 INJECTION, SUSPENSION INTRA-ARTICULAR; INTRALESIONAL; INTRAMUSCULAR; SOFT TISSUE at 10:20

## 2023-11-10 NOTE — PROGRESS NOTES
[facility] [place] [facility] [place]          Patient: Hector Johnston Jr.  YOB: 1950  Date of Encounter: 11/10/2023  PCP: Anastacia Naidu APRN      Subjective     Chief Complaint   Patient presents with    Left Shoulder - Pain, Follow-up    Right Shoulder - Pain, Follow-up           History of Present Illness:     Hector Johnston Jr. is a 73 y.o. male presents today for follow-up evaluation bilateral shoulder pain and impingement.  The patient received intra-articular steroid injection left shoulder previous office visit several months ago with resolution of pain and symptoms.  He reports improved overall daily activity and range of motion and decreased difficulty upon overhead motion and activity.  He reports very early return of symptoms and progressive pain upon reaching pushing and pulling.  Patient has continued with his current occupation and modifying activity. patient reports no new complaints and denies any paresthesias.        Patient Active Problem List   Diagnosis    Hypogonadism in male    Corporo-venous occlusive erectile dysfunction    Abnormal cardiovascular stress test     Past Medical History:   Diagnosis Date    Diabetes mellitus     Hypertension      Past Surgical History:   Procedure Laterality Date    CARDIAC CATHETERIZATION N/A 5/4/2018    Procedure: Left Heart Cath;  Surgeon: Jose Dickerson MD;  Location: [facility] INVASIVE LOCATION;  Service: Cardiovascular    CHOLECYSTECTOMY      MOUTH SURGERY       Social History     Occupational History    Not on file   Tobacco Use    Smoking status: Former    Smokeless tobacco: Never   Vaping Use    Vaping Use: Never used   Substance and Sexual Activity    Alcohol use: No    Drug use: No    Sexual activity: Yes     Partners: Female    Hector Johnston Jr.  reports that he has quit smoking. He has never used smokeless tobacco.. I have educated him on the risk of diseases from using tobacco products such as cancer,  COPD and heart disease.          Social History     Social History Narrative    Not on file     Family History   Problem Relation Age of Onset    No Known Problems Father     No Known Problems Mother      Current Outpatient Medications   Medication Sig Dispense Refill    amitriptyline (ELAVIL) 10 MG tablet Take 1 to 2 tablets by mouth Every Night at bedtime 60 tablet 3    aspirin 81 MG EC tablet Take 1 tablet by mouth Every Morning.      atorvastatin (LIPITOR) 10 MG tablet Take 1 tablet by mouth Every Night.      Blood Glucose Monitoring Suppl (TRUE METRIX METER) w/Device kit Use to test blood sugar 3 times a day and as needed 1 kit 0    brimonidine (ALPHAGAN P) 0.1 % solution ophthalmic solution Every 8 (Eight) Hours.      Cholecalciferol 50 MCG (2000 UT) tablet Take 1 tablet by mouth Daily. 90 each 3    DULoxetine (CYMBALTA) 30 MG capsule Take 1 capsule by mouth Daily.      fenofibrate micronized (LOFIBRA) 134 MG capsule       gabapentin (NEURONTIN) 100 MG capsule       glimepiride (AMARYL) 4 MG tablet Take 1 tablet by mouth 2 (Two) Times a Day. 180 tablet 3    glucose blood test strip Use to test blood sugar 3 times a day & as needed 100 each 5    ibuprofen (ADVIL,MOTRIN) 800 MG tablet Take 1 tablet by mouth Every 6 to 8 Hours As Needed with food. 120 tablet 3    Lantus SoloStar 100 UNIT/ML injection pen       latanoprost (XALATAN) 0.005 % ophthalmic solution Apply 1 drop to left eye as directed by provider Every Night. 2.5 mL 9    linagliptin (TRADJENTA) 5 MG tablet tablet Take 1 tablet by mouth Daily. 30 tablet 5    loratadine (CLARITIN) 10 MG tablet Take 1 tablet by mouth Every Morning. 30 tablet 3    metFORMIN (GLUCOPHAGE) 850 MG tablet Take 500 mg by mouth 2 (Two) Times a Day With Meals.      metFORMIN (GLUCOPHAGE) 850 MG tablet Take 1 tablet by mouth 2 (Two) Times a Day with food. 120 tablet 3    Multiple Vitamins-Minerals (MULTIVITAMIN ADULT PO) Take 1 tablet by mouth Every Morning.      sildenafil  "(REVATIO) 20 MG tablet 1-5 by mouth one hour prior to intercourse on an empty stomach 24 tablet 11    Syringe, Disposable, 3 ML misc Use 3 ml syringe with a 25 gauge  5/8 inch needle 24 each 6    timolol (BETIMOL) 0.25 % ophthalmic solution Instill 1 drop into both eyes twice a day 5 mL 6    timolol (TIMOPTIC) 0.5 % ophthalmic solution Instill 1 drop into both eyes twice a day 30 mL 3    timolol (TIMOPTIC) 0.5 % ophthalmic solution Administer 1 drop to both eyes 3 (Three) Times a Day. 30 mL 3    TRUEPLUS LANCETS 33G misc Use to test blood sugar 3 times a day & as needed 100 each 5    valsartan (DIOVAN) 40 MG tablet Take 1 tablet by mouth Daily.      azithromycin (ZITHROMAX) 500 MG tablet Take 1 tablet by mouth Daily. 5 tablet 0     No current facility-administered medications for this visit.     No Known Allergies         Review of Systems   Constitutional: Negative.   HENT: Negative.     Eyes:  Positive for pain.   Cardiovascular: Negative.    Respiratory: Negative.     Endocrine: Negative.    Hematologic/Lymphatic: Negative.    Skin: Negative.    Musculoskeletal:  Positive for joint pain.        Pertinent positives listed in HPI   Gastrointestinal: Negative.    Genitourinary: Negative.    Neurological: Negative.    Psychiatric/Behavioral: Negative.     Allergic/Immunologic: Negative.          Objective      Vitals:    11/10/23 0949   Weight: 95.3 kg (210 lb 1.6 oz)   Height: 180.3 cm (70.98\")      BMI is >= 25 and <30. (Overweight) The following options were offered after discussion;: exercise counseling/recommendations and nutrition counseling/recommendations      Physical Exam  Vitals and nursing note reviewed.   Constitutional:       General: He is not in acute distress.     Appearance: Normal appearance. He is not ill-appearing.   HENT:      Head: Normocephalic and atraumatic.      Right Ear: External ear normal.      Left Ear: External ear normal.      Nose: Nose normal.      Mouth/Throat:      Mouth: Mucous " membranes are moist.      Pharynx: Oropharynx is clear.   Eyes:      Extraocular Movements: Extraocular movements intact.      Conjunctiva/sclera: Conjunctivae normal.      Pupils: Pupils are equal, round, and reactive to light.   Cardiovascular:      Rate and Rhythm: Normal rate.      Pulses: Normal pulses.   Pulmonary:      Effort: Pulmonary effort is normal.   Abdominal:      General: There is no distension.   Musculoskeletal:      Cervical back: Normal range of motion. No rigidity.   Skin:     General: Skin is warm and dry.      Capillary Refill: Capillary refill takes less than 2 seconds.   Neurological:      General: No focal deficit present.      Mental Status: He is alert and oriented to person, place, and time.   Psychiatric:         Mood and Affect: Mood normal.         Behavior: Behavior normal.   Right Shoulder Exam     Tenderness   The patient is experiencing tenderness in the acromion.    Range of Motion   Active abduction:  100 abnormal   External rotation:  normal   Forward flexion:  110 abnormal   Internal rotation 0 degrees:  Lumbar     Muscle Strength   Abduction: 5/5   Internal rotation: 5/5   External rotation: 5/5   Supraspinatus: 5/5   Subscapularis: 5/5   Biceps: 5/5     Tests   Mays test: positive  Impingement: positive  Drop arm: negative  Sulcus: absent    Other   Erythema: absent  Pulse: present      Left Shoulder Exam     Tenderness   The patient is experiencing tenderness in the acromion.    Range of Motion   Active abduction:  100 abnormal   External rotation:  normal   Forward flexion:  110 abnormal   Internal rotation 0 degrees:  Lumbar     Muscle Strength   Abduction: 5/5   Internal rotation: 5/5   External rotation: 5/5   Supraspinatus: 5/5   Subscapularis: 5/5   Biceps: 5/5     Tests   Mays test: positive  Cross arm: negative  Impingement: positive  Drop arm: negative  Sulcus: absent    Other   Erythema: absent  Pulse: present             Radiology:      No radiology results  for the last 90 days.            Assessment/Plan      No diagnosis found.      73-year-old male with notable progressive onset worsening bilateral shoulder impingement with previous MRI evidence of bursal sided rotator cuff tendinosis and impingement syndrome left shoulder.  The patient has responded with the most recent subacromial injections left shoulder.  Secondary to return of pain symptoms the patient was provided today with repeat steroid injections left shoulder 80 mg Depo-Medrol with lidocaine block injected into the subacromial space left shoulder.  Patient tolerated this procedure well.  He was instructed to return back in 4 weeks manage we discussed the possibility of progression with right shoulder injection and further conservative treatment options.        Large Joint Arthrocentesis: L subacromial bursa  Date/Time: 11/10/2023 10:20 AM  Consent given by: patient  Site marked: site marked  Supporting Documentation  Indications: pain and diagnostic evaluation   Procedure Details  Location: shoulder - L subacromial bursa  Needle size: 25 G  Approach: lateral  Medications administered: 80 mg methylPREDNISolone acetate 80 MG/ML; 5 mL lidocaine PF 1% 1 %  Patient tolerance: patient tolerated the procedure well with no immediate complications                      This document was signed by Petr Lim PA-C November 10, 2023    CC: MondayAnastacia APRN      Dictated Utilizing Dragon Dictation:   Please note that portions of this note were completed with a voice recognition program.   Part of this note may be an electronic transcription/translation of spoken language to printed text using the Dragon Dictation System.

## 2023-11-24 RX ORDER — METHYLPREDNISOLONE ACETATE 80 MG/ML
80 INJECTION, SUSPENSION INTRA-ARTICULAR; INTRALESIONAL; INTRAMUSCULAR; SOFT TISSUE
Status: COMPLETED | OUTPATIENT
Start: 2023-11-10 | End: 2023-11-10

## 2023-11-24 RX ORDER — LIDOCAINE HYDROCHLORIDE 10 MG/ML
5 INJECTION, SOLUTION EPIDURAL; INFILTRATION; INTRACAUDAL; PERINEURAL
Status: COMPLETED | OUTPATIENT
Start: 2023-11-10 | End: 2023-11-10

## 2023-12-06 ENCOUNTER — OFFICE VISIT (OUTPATIENT)
Dept: ORTHOPEDIC SURGERY | Facility: CLINIC | Age: 73
End: 2023-12-06
Payer: MEDICARE

## 2023-12-06 VITALS — WEIGHT: 210 LBS | HEIGHT: 71 IN | BODY MASS INDEX: 29.4 KG/M2

## 2023-12-06 DIAGNOSIS — M75.41 IMPINGEMENT SYNDROME OF BOTH SHOULDERS: Primary | ICD-10-CM

## 2023-12-06 DIAGNOSIS — M75.42 IMPINGEMENT SYNDROME OF BOTH SHOULDERS: Primary | ICD-10-CM

## 2023-12-06 PROCEDURE — 99213 OFFICE O/P EST LOW 20 MIN: CPT | Performed by: PHYSICIAN ASSISTANT

## 2023-12-06 NOTE — PROGRESS NOTES
[facility] [place] Patient Visit          Patient: Hector Johnston Jr.  YOB: 1950  Date of Encounter: 12/6/2023  PCP: Anastacia Naidu APRN      Subjective     Chief Complaint   Patient presents with    Left Shoulder - Follow-up, Pain    Right Shoulder - Follow-up, Pain           History of Present Illness:     Hector Johnston Jr. is a 73 y.o. male presents today for follow-up evaluation bilateral shoulder pain and impingement.  The patient received previous injections with notable relief of his shoulder pain. He states several months ago with resolution of pain and symptoms.  He reports improved overall daily activity and range of motion and decreased difficulty upon overhead motion and activity.  He reports no new complaints and denies any paresthesias.        Patient Active Problem List   Diagnosis    Hypogonadism in male    Corporo-venous occlusive erectile dysfunction    Abnormal cardiovascular stress test     Past Medical History:   Diagnosis Date    Diabetes mellitus     Hypertension      Past Surgical History:   Procedure Laterality Date    CARDIAC CATHETERIZATION N/A 5/4/2018    Procedure: Left Heart Cath;  Surgeon: Jose Dickerson MD;  Location: [facility] INVASIVE LOCATION;  Service: Cardiovascular    CHOLECYSTECTOMY      MOUTH SURGERY       Social History     Occupational History    Not on file   Tobacco Use    Smoking status: Former    Smokeless tobacco: Never   Vaping Use    Vaping Use: Never used   Substance and Sexual Activity    Alcohol use: No    Drug use: No    Sexual activity: Yes     Partners: Female    Hector Johnston Jr.  reports that he has quit smoking. He has never used smokeless tobacco.. I have educated him on the risk of diseases from using tobacco products such as cancer, COPD and heart disease.          Social History     Social History Narrative    Not on file     Family History   Problem Relation Age of Onset    No Known Problems Father     No  Known Problems Mother      Current Outpatient Medications   Medication Sig Dispense Refill    amitriptyline (ELAVIL) 10 MG tablet Take 1 to 2 tablets by mouth Every Night at bedtime 60 tablet 3    aspirin 81 MG EC tablet Take 1 tablet by mouth Every Morning.      atorvastatin (LIPITOR) 10 MG tablet Take 1 tablet by mouth Every Night.      azithromycin (ZITHROMAX) 500 MG tablet Take 1 tablet by mouth Daily. 5 tablet 0    Blood Glucose Monitoring Suppl (TRUE METRIX METER) w/Device kit Use to test blood sugar 3 times a day and as needed 1 kit 0    brimonidine (ALPHAGAN P) 0.1 % solution ophthalmic solution Every 8 (Eight) Hours.      Cholecalciferol 50 MCG (2000 UT) tablet Take 1 tablet by mouth Daily. 90 each 3    DULoxetine (CYMBALTA) 30 MG capsule Take 1 capsule by mouth Daily.      fenofibrate micronized (LOFIBRA) 134 MG capsule       gabapentin (NEURONTIN) 100 MG capsule       glimepiride (AMARYL) 4 MG tablet Take 1 tablet by mouth 2 (Two) Times a Day. 180 tablet 3    glucose blood test strip Use to test blood sugar 3 times a day & as needed 100 each 5    ibuprofen (ADVIL,MOTRIN) 800 MG tablet Take 1 tablet by mouth Every 6 to 8 Hours As Needed with food. 120 tablet 3    Lantus SoloStar 100 UNIT/ML injection pen       latanoprost (XALATAN) 0.005 % ophthalmic solution Apply 1 drop to left eye as directed by provider Every Night. 2.5 mL 9    linagliptin (TRADJENTA) 5 MG tablet tablet Take 1 tablet by mouth Daily. 30 tablet 5    loratadine (CLARITIN) 10 MG tablet Take 1 tablet by mouth Every Morning. 30 tablet 3    metFORMIN (GLUCOPHAGE) 850 MG tablet Take 500 mg by mouth 2 (Two) Times a Day With Meals.      metFORMIN (GLUCOPHAGE) 850 MG tablet Take 1 tablet by mouth 2 (Two) Times a Day with food. 120 tablet 3    Multiple Vitamins-Minerals (MULTIVITAMIN ADULT PO) Take 1 tablet by mouth Every Morning.      sildenafil (REVATIO) 20 MG tablet 1-5 by mouth one hour prior to intercourse on an empty stomach 24 tablet 11     "Syringe, Disposable, 3 ML misc Use 3 ml syringe with a 25 gauge  5/8 inch needle 24 each 6    timolol (BETIMOL) 0.25 % ophthalmic solution Instill 1 drop into both eyes twice a day 5 mL 6    timolol (TIMOPTIC) 0.5 % ophthalmic solution Instill 1 drop into both eyes twice a day 30 mL 3    timolol (TIMOPTIC) 0.5 % ophthalmic solution Administer 1 drop to both eyes 3 (Three) Times a Day. 30 mL 3    TRUEPLUS LANCETS 33G misc Use to test blood sugar 3 times a day & as needed 100 each 5    valsartan (DIOVAN) 40 MG tablet Take 1 tablet by mouth Daily.       No current facility-administered medications for this visit.     No Known Allergies         Review of Systems   Constitutional: Negative.   HENT: Negative.     Eyes:  Positive for pain.   Cardiovascular: Negative.    Respiratory: Negative.     Endocrine: Negative.    Hematologic/Lymphatic: Negative.    Skin: Negative.    Musculoskeletal:  Positive for joint pain.        Pertinent positives listed in HPI   Gastrointestinal: Negative.    Genitourinary: Negative.    Neurological: Negative.    Psychiatric/Behavioral: Negative.     Allergic/Immunologic: Negative.          Objective      Vitals:    12/06/23 0856   Weight: 95.3 kg (210 lb)   Height: 180.3 cm (70.98\")      BMI is >= 25 and <30. (Overweight) The following options were offered after discussion;: exercise counseling/recommendations and nutrition counseling/recommendations      Physical Exam  Vitals and nursing note reviewed.   Constitutional:       General: He is not in acute distress.     Appearance: Normal appearance. He is not ill-appearing.   HENT:      Head: Normocephalic and atraumatic.      Right Ear: External ear normal.      Left Ear: External ear normal.      Nose: Nose normal.      Mouth/Throat:      Mouth: Mucous membranes are moist.      Pharynx: Oropharynx is clear.   Eyes:      Extraocular Movements: Extraocular movements intact.      Conjunctiva/sclera: Conjunctivae normal.      Pupils: Pupils are " equal, round, and reactive to light.   Cardiovascular:      Rate and Rhythm: Normal rate.      Pulses: Normal pulses.   Pulmonary:      Effort: Pulmonary effort is normal.   Abdominal:      General: There is no distension.   Musculoskeletal:      Cervical back: Normal range of motion. No rigidity.   Skin:     General: Skin is warm and dry.      Capillary Refill: Capillary refill takes less than 2 seconds.   Neurological:      General: No focal deficit present.      Mental Status: He is alert and oriented to person, place, and time.   Psychiatric:         Mood and Affect: Mood normal.         Behavior: Behavior normal.     Right Shoulder Exam     Tenderness   The patient is experiencing tenderness in the acromion.    Range of Motion   Active abduction:  100 abnormal   External rotation:  normal   Forward flexion:  110 abnormal   Internal rotation 0 degrees:  Lumbar     Muscle Strength   Abduction: 5/5   Internal rotation: 5/5   External rotation: 5/5   Supraspinatus: 5/5   Subscapularis: 5/5   Biceps: 5/5     Tests   Mays test: positive  Impingement: positive  Drop arm: negative  Sulcus: absent    Other   Erythema: absent  Pulse: present      Left Shoulder Exam     Tenderness   The patient is experiencing tenderness in the acromion.    Range of Motion   Active abduction:  100 abnormal   External rotation:  normal   Forward flexion:  110 abnormal   Internal rotation 0 degrees:  Lumbar     Muscle Strength   Abduction: 5/5   Internal rotation: 5/5   External rotation: 5/5   Supraspinatus: 5/5   Subscapularis: 5/5   Biceps: 5/5     Tests   Mays test: positive  Cross arm: negative  Impingement: positive  Drop arm: negative  Sulcus: absent    Other   Erythema: absent  Pulse: present               Radiology:      No radiology results for the last 90 days.            Assessment/Plan        ICD-10-CM ICD-9-CM   1. Impingement syndrome of both shoulders  M75.41 726.2    M75.42          73-year-old male with notable  progressive onset worsening bilateral shoulder impingement with previous MRI evidence of bursal sided rotator cuff tendinosis and impingement syndrome left shoulder.  The patient has responded with the most recent subacromial injections left shoulder.  Secondary to alleviation the patient was asked to return back upon any complication or return of pain/symptoms. Follow up when necessary.             This document was signed by Petr Lim PA-C December 6, 2023    CC: MondayAnastacia APRN      Dictated Utilizing Dragon Dictation:   Please note that portions of this note were completed with a voice recognition program.   Part of this note may be an electronic transcription/translation of spoken language to printed text using the Dragon Dictation System.

## 2024-01-14 ENCOUNTER — HOSPITAL ENCOUNTER (EMERGENCY)
Facility: HOSPITAL | Age: 74
Discharge: HOME | End: 2024-01-14
Attending: EMERGENCY MEDICINE | Admitting: EMERGENCY MEDICINE
Payer: MEDICARE

## 2024-01-14 ENCOUNTER — APPOINTMENT (OUTPATIENT)
Dept: GENERAL RADIOLOGY | Facility: HOSPITAL | Age: 74
End: 2024-01-14
Payer: MEDICARE

## 2024-01-14 VITALS
SYSTOLIC BLOOD PRESSURE: 146 MMHG | OXYGEN SATURATION: 99 % | DIASTOLIC BLOOD PRESSURE: 82 MMHG | HEIGHT: 71 IN | HEART RATE: 72 BPM | TEMPERATURE: 97.9 F | RESPIRATION RATE: 17 BRPM | WEIGHT: 200 LBS | BODY MASS INDEX: 28 KG/M2

## 2024-01-14 DIAGNOSIS — J06.9 VIRAL URI: Primary | ICD-10-CM

## 2024-01-14 LAB
ALBUMIN SERPL-MCNC: 4.2 G/DL (ref 3.5–5.2)
ALBUMIN/GLOB SERPL: 1.5 G/DL
ALP SERPL-CCNC: 57 U/L (ref 39–117)
ALT SERPL W P-5'-P-CCNC: 24 U/L (ref 1–41)
ANION GAP SERPL CALCULATED.3IONS-SCNC: 11.2 MMOL/L (ref 5–15)
AST SERPL-CCNC: 22 U/L (ref 1–40)
BASOPHILS # BLD AUTO: 0.04 10*3/MM3 (ref 0–0.2)
BASOPHILS NFR BLD AUTO: 0.7 % (ref 0–1.5)
BILIRUB SERPL-MCNC: 0.3 MG/DL (ref 0–1.2)
BUN SERPL-MCNC: 21 MG/DL (ref 8–23)
BUN/CREAT SERPL: 16.9 (ref 7–25)
CALCIUM SPEC-SCNC: 10 MG/DL (ref 8.6–10.5)
CHLORIDE SERPL-SCNC: 103 MMOL/L (ref 98–107)
CO2 SERPL-SCNC: 24.8 MMOL/L (ref 22–29)
CREAT SERPL-MCNC: 1.24 MG/DL (ref 0.76–1.27)
DEPRECATED RDW RBC AUTO: 41.3 FL (ref 37–54)
EGFRCR SERPLBLD CKD-EPI 2021: 61.4 ML/MIN/1.73
EOSINOPHIL # BLD AUTO: 0.2 10*3/MM3 (ref 0–0.4)
EOSINOPHIL NFR BLD AUTO: 3.3 % (ref 0.3–6.2)
ERYTHROCYTE [DISTWIDTH] IN BLOOD BY AUTOMATED COUNT: 12.6 % (ref 12.3–15.4)
FLUAV RNA RESP QL NAA+PROBE: NOT DETECTED
FLUBV RNA RESP QL NAA+PROBE: NOT DETECTED
GLOBULIN UR ELPH-MCNC: 2.8 GM/DL
GLUCOSE SERPL-MCNC: 268 MG/DL (ref 65–99)
HCT VFR BLD AUTO: 41.2 % (ref 37.5–51)
HGB BLD-MCNC: 13.7 G/DL (ref 13–17.7)
IMM GRANULOCYTES # BLD AUTO: 0.04 10*3/MM3 (ref 0–0.05)
IMM GRANULOCYTES NFR BLD AUTO: 0.7 % (ref 0–0.5)
LYMPHOCYTES # BLD AUTO: 2.01 10*3/MM3 (ref 0.7–3.1)
LYMPHOCYTES NFR BLD AUTO: 33 % (ref 19.6–45.3)
MCH RBC QN AUTO: 30 PG (ref 26.6–33)
MCHC RBC AUTO-ENTMCNC: 33.3 G/DL (ref 31.5–35.7)
MCV RBC AUTO: 90.2 FL (ref 79–97)
MONOCYTES # BLD AUTO: 0.56 10*3/MM3 (ref 0.1–0.9)
MONOCYTES NFR BLD AUTO: 9.2 % (ref 5–12)
NEUTROPHILS NFR BLD AUTO: 3.24 10*3/MM3 (ref 1.7–7)
NEUTROPHILS NFR BLD AUTO: 53.1 % (ref 42.7–76)
NRBC BLD AUTO-RTO: 0 /100 WBC (ref 0–0.2)
PLATELET # BLD AUTO: 206 10*3/MM3 (ref 140–450)
PMV BLD AUTO: 10 FL (ref 6–12)
POTASSIUM SERPL-SCNC: 4.6 MMOL/L (ref 3.5–5.2)
PROT SERPL-MCNC: 7 G/DL (ref 6–8.5)
RBC # BLD AUTO: 4.57 10*6/MM3 (ref 4.14–5.8)
SARS-COV-2 RNA RESP QL NAA+PROBE: NOT DETECTED
SODIUM SERPL-SCNC: 139 MMOL/L (ref 136–145)
WBC NRBC COR # BLD AUTO: 6.09 10*3/MM3 (ref 3.4–10.8)

## 2024-01-14 PROCEDURE — 87636 SARSCOV2 & INF A&B AMP PRB: CPT | Performed by: PHYSICIAN ASSISTANT

## 2024-01-14 PROCEDURE — 71046 X-RAY EXAM CHEST 2 VIEWS: CPT | Performed by: RADIOLOGY

## 2024-01-14 PROCEDURE — 80053 COMPREHEN METABOLIC PANEL: CPT | Performed by: PHYSICIAN ASSISTANT

## 2024-01-14 PROCEDURE — 36415 COLL VENOUS BLD VENIPUNCTURE: CPT

## 2024-01-14 PROCEDURE — 85025 COMPLETE CBC W/AUTO DIFF WBC: CPT | Performed by: PHYSICIAN ASSISTANT

## 2024-01-14 PROCEDURE — 99283 EMERGENCY DEPT VISIT LOW MDM: CPT

## 2024-01-14 PROCEDURE — 71046 X-RAY EXAM CHEST 2 VIEWS: CPT

## 2024-01-14 NOTE — ED PROVIDER NOTES
Subjective   History of Present Illness  74-year-old male that presents to the emergency department with complaint of congestion, cough x 2 days.    History provided by:  Patient   used: No    URI  Presenting symptoms: congestion    Presenting symptoms: no fatigue and no fever    Severity:  Moderate  Onset quality:  Gradual  Duration:  1 day  Timing:  Intermittent  Progression:  Worsening  Chronicity:  New  Relieved by:  Nothing  Worsened by:  Nothing  Ineffective treatments:  None tried  Associated symptoms: arthralgias and myalgias    Associated symptoms: no headaches    Risk factors: not elderly, no chronic cardiac disease, no chronic kidney disease, no chronic respiratory disease, no recent illness, no recent travel and no sick contacts        Review of Systems   Constitutional: Negative.  Negative for chills, diaphoresis, fatigue and fever.   HENT:  Positive for congestion.    Eyes: Negative.  Negative for photophobia and itching.   Respiratory: Negative.  Negative for choking, chest tightness and stridor.    Cardiovascular: Negative.    Gastrointestinal: Negative.  Negative for anal bleeding, blood in stool, constipation, diarrhea and nausea.   Endocrine: Negative.  Negative for heat intolerance and polydipsia.   Genitourinary: Negative.  Negative for enuresis and genital sores.   Musculoskeletal:  Positive for arthralgias and myalgias.   Skin: Negative.  Negative for pallor and rash.   Neurological: Negative.  Negative for facial asymmetry, light-headedness, numbness and headaches.   Hematological: Negative.  Negative for adenopathy.   Psychiatric/Behavioral: Negative.  Negative for confusion, decreased concentration, dysphoric mood and hallucinations. The patient is not hyperactive.    All other systems reviewed and are negative.      Past Medical History:   Diagnosis Date    Diabetes mellitus     Hypertension        No Known Allergies    Past Surgical History:   Procedure Laterality Date     CARDIAC CATHETERIZATION N/A 5/4/2018    Procedure: Left Heart Cath;  Surgeon: Jose Dickerson MD;  Location: [facility] CATH INVASIVE LOCATION;  Service: Cardiovascular    CHOLECYSTECTOMY      MOUTH SURGERY         Family History   Problem Relation Age of Onset    No Known Problems Father     No Known Problems Mother        Social History     Socioeconomic History    Marital status:    Tobacco Use    Smoking status: Former    Smokeless tobacco: Never   Vaping Use    Vaping Use: Never used   Substance and Sexual Activity    Alcohol use: No    Drug use: No    Sexual activity: Yes     Partners: Female           Objective   Physical Exam  Vitals and nursing note reviewed.   Constitutional:       General: He is not in acute distress.     Appearance: Normal appearance. He is normal weight. He is not ill-appearing, toxic-appearing or diaphoretic.   HENT:      Head: Normocephalic and atraumatic.      Right Ear: Tympanic membrane, ear canal and external ear normal. There is no impacted cerumen.      Left Ear: Tympanic membrane, ear canal and external ear normal. There is no impacted cerumen.      Nose: Nose normal. No congestion or rhinorrhea.      Mouth/Throat:      Mouth: Mucous membranes are moist.      Pharynx: Oropharynx is clear. No oropharyngeal exudate or posterior oropharyngeal erythema.   Eyes:      General: No scleral icterus.        Right eye: No discharge.         Left eye: No discharge.      Extraocular Movements: Extraocular movements intact.      Conjunctiva/sclera: Conjunctivae normal.      Pupils: Pupils are equal, round, and reactive to light.   Cardiovascular:      Rate and Rhythm: Normal rate and regular rhythm.      Pulses: Normal pulses.      Heart sounds: Normal heart sounds. No murmur heard.     No friction rub. No gallop.   Pulmonary:      Effort: Pulmonary effort is normal. No respiratory distress.      Breath sounds: Normal breath sounds. No stridor. No wheezing, rhonchi or rales.   Chest:       Chest wall: No tenderness.   Abdominal:      General: Abdomen is flat. Bowel sounds are normal. There is no distension.      Palpations: Abdomen is soft. There is no mass.      Tenderness: There is no abdominal tenderness. There is no right CVA tenderness, guarding or rebound.      Hernia: No hernia is present.   Musculoskeletal:         General: No swelling, tenderness, deformity or signs of injury. Normal range of motion.      Cervical back: Normal range of motion and neck supple. No rigidity or tenderness.      Right lower leg: No edema.      Left lower leg: No edema.   Lymphadenopathy:      Cervical: No cervical adenopathy.   Skin:     General: Skin is warm and dry.      Capillary Refill: Capillary refill takes less than 2 seconds.      Coloration: Skin is not jaundiced or pale.      Findings: No bruising, erythema, lesion or rash.   Neurological:      General: No focal deficit present.      Mental Status: He is alert and oriented to person, place, and time. Mental status is at baseline.      Cranial Nerves: No cranial nerve deficit.      Sensory: No sensory deficit.      Motor: No weakness.      Coordination: Coordination normal.      Gait: Gait normal.   Psychiatric:         Mood and Affect: Mood normal.         Behavior: Behavior normal.         Thought Content: Thought content normal.         Judgment: Judgment normal.         Procedures           ED Course  ED Course as of 01/14/24 1552   Sun Jan 14, 2024   1550 IMPRESSION:     No evidence of acute disease in the chest.   [BH]      ED Course User Index  [] Lawson Molina PA-C                                             Medical Decision Making  Problems Addressed:  Viral URI: complicated acute illness or injury    Amount and/or Complexity of Data Reviewed  Labs: ordered.  Radiology: ordered.        Final diagnoses:   Viral URI       ED Disposition  ED Disposition       ED Disposition   Discharge    Condition   Stable    Comment   --                Monday, Anastacia Chan, APRN  [address] [facility] [address]  [phone]    Call in 1 day           Medication List      No changes were made to your prescriptions during this visit.            Lawson Molina PA-C  01/14/24 1558

## 2024-04-21 ENCOUNTER — APPOINTMENT (OUTPATIENT)
Dept: GENERAL RADIOLOGY | Facility: HOSPITAL | Age: 74
End: 2024-04-21
Payer: MEDICARE

## 2024-04-21 ENCOUNTER — HOSPITAL ENCOUNTER (INPATIENT)
Facility: HOSPITAL | Age: 74
LOS: 2 days | Discharge: HOME | End: 2024-04-24
Attending: STUDENT IN AN ORGANIZED HEALTH CARE EDUCATION/TRAINING PROGRAM | Admitting: INTERNAL MEDICINE
Payer: MEDICARE

## 2024-04-21 DIAGNOSIS — I25.10 CORONARY ARTERY DISEASE INVOLVING NATIVE CORONARY ARTERY OF NATIVE HEART, UNSPECIFIED WHETHER ANGINA PRESENT: ICD-10-CM

## 2024-04-21 DIAGNOSIS — I21.4 NSTEMI (NON-ST ELEVATED MYOCARDIAL INFARCTION): Primary | ICD-10-CM

## 2024-04-21 PROBLEM — H43.02 VITREOUS PROLAPSE, LEFT EYE: Status: ACTIVE | Noted: 2021-03-03

## 2024-04-21 PROBLEM — H35.372 EPIRETINAL MEMBRANE (ERM) OF LEFT EYE: Status: ACTIVE | Noted: 2021-03-03

## 2024-04-21 PROBLEM — K40.90 HERNIA, INGUINAL, LEFT: Status: ACTIVE | Noted: 2024-04-21

## 2024-04-21 PROBLEM — H40.1190 PRIMARY OPEN-ANGLE GLAUCOMA: Status: ACTIVE | Noted: 2020-12-29

## 2024-04-21 PROBLEM — Z96.1 BILATERAL PSEUDOPHAKIA: Status: ACTIVE | Noted: 2020-12-29

## 2024-04-21 LAB
ALBUMIN SERPL-MCNC: 4.1 G/DL (ref 3.5–5.2)
ALBUMIN/GLOB SERPL: 1.3 G/DL
ALP SERPL-CCNC: 59 U/L (ref 39–117)
ALT SERPL W P-5'-P-CCNC: 25 U/L (ref 1–41)
ANION GAP SERPL CALCULATED.3IONS-SCNC: 9.2 MMOL/L (ref 5–15)
APTT PPP: 23.5 SECONDS (ref 26.5–34.5)
AST SERPL-CCNC: 25 U/L (ref 1–40)
BASOPHILS # BLD AUTO: 0.03 10*3/MM3 (ref 0–0.2)
BASOPHILS NFR BLD AUTO: 0.5 % (ref 0–1.5)
BILIRUB SERPL-MCNC: 0.2 MG/DL (ref 0–1.2)
BUN SERPL-MCNC: 21 MG/DL (ref 8–23)
BUN/CREAT SERPL: 16.3 (ref 7–25)
CALCIUM SPEC-SCNC: 9.7 MG/DL (ref 8.6–10.5)
CHLORIDE SERPL-SCNC: 105 MMOL/L (ref 98–107)
CO2 SERPL-SCNC: 24.8 MMOL/L (ref 22–29)
CREAT SERPL-MCNC: 1.29 MG/DL (ref 0.76–1.27)
DEPRECATED RDW RBC AUTO: 39.6 FL (ref 37–54)
EGFRCR SERPLBLD CKD-EPI 2021: 58.5 ML/MIN/1.73
EOSINOPHIL # BLD AUTO: 0.18 10*3/MM3 (ref 0–0.4)
EOSINOPHIL NFR BLD AUTO: 2.8 % (ref 0.3–6.2)
ERYTHROCYTE [DISTWIDTH] IN BLOOD BY AUTOMATED COUNT: 12.2 % (ref 12.3–15.4)
GEN 5 2HR TROPONIN T REFLEX: 60 NG/L
GLOBULIN UR ELPH-MCNC: 3.2 GM/DL
GLUCOSE BLDC GLUCOMTR-MCNC: 198 MG/DL (ref 70–130)
GLUCOSE SERPL-MCNC: 244 MG/DL (ref 65–99)
HCT VFR BLD AUTO: 42.8 % (ref 37.5–51)
HGB BLD-MCNC: 13.9 G/DL (ref 13–17.7)
HOLD SPECIMEN: NORMAL
HOLD SPECIMEN: NORMAL
IMM GRANULOCYTES # BLD AUTO: 0.03 10*3/MM3 (ref 0–0.05)
IMM GRANULOCYTES NFR BLD AUTO: 0.5 % (ref 0–0.5)
INR PPP: 0.86 (ref 0.9–1.1)
LIPASE SERPL-CCNC: 47 U/L (ref 13–60)
LYMPHOCYTES # BLD AUTO: 2.3 10*3/MM3 (ref 0.7–3.1)
LYMPHOCYTES NFR BLD AUTO: 36.3 % (ref 19.6–45.3)
MAGNESIUM SERPL-MCNC: 1.8 MG/DL (ref 1.6–2.4)
MCH RBC QN AUTO: 29 PG (ref 26.6–33)
MCHC RBC AUTO-ENTMCNC: 32.5 G/DL (ref 31.5–35.7)
MCV RBC AUTO: 89.4 FL (ref 79–97)
MONOCYTES # BLD AUTO: 0.61 10*3/MM3 (ref 0.1–0.9)
MONOCYTES NFR BLD AUTO: 9.6 % (ref 5–12)
NEUTROPHILS NFR BLD AUTO: 3.19 10*3/MM3 (ref 1.7–7)
NEUTROPHILS NFR BLD AUTO: 50.3 % (ref 42.7–76)
NRBC BLD AUTO-RTO: 0 /100 WBC (ref 0–0.2)
NT-PROBNP SERPL-MCNC: 429.1 PG/ML (ref 0–900)
PLATELET # BLD AUTO: 203 10*3/MM3 (ref 140–450)
PMV BLD AUTO: 9.6 FL (ref 6–12)
POTASSIUM SERPL-SCNC: 4.8 MMOL/L (ref 3.5–5.2)
PROT SERPL-MCNC: 7.3 G/DL (ref 6–8.5)
PROTHROMBIN TIME: 12.2 SECONDS (ref 12.1–14.7)
QT INTERVAL: 366 MS
QT INTERVAL: 398 MS
QTC INTERVAL: 408 MS
QTC INTERVAL: 413 MS
RBC # BLD AUTO: 4.79 10*6/MM3 (ref 4.14–5.8)
SODIUM SERPL-SCNC: 139 MMOL/L (ref 136–145)
T4 FREE SERPL-MCNC: 0.85 NG/DL (ref 0.93–1.7)
TROPONIN T DELTA: 6 NG/L
TROPONIN T SERPL HS-MCNC: 54 NG/L
TSH SERPL DL<=0.05 MIU/L-ACNC: 1.8 UIU/ML (ref 0.27–4.2)
UFH PPP CHRO-ACNC: <0.1 IU/ML (ref 0.3–0.7)
WBC NRBC COR # BLD AUTO: 6.34 10*3/MM3 (ref 3.4–10.8)
WHOLE BLOOD HOLD COAG: NORMAL
WHOLE BLOOD HOLD SPECIMEN: NORMAL

## 2024-04-21 PROCEDURE — 25010000002 HEPARIN (PORCINE) PER 1000 UNITS: Performed by: PHYSICIAN ASSISTANT

## 2024-04-21 PROCEDURE — 82948 REAGENT STRIP/BLOOD GLUCOSE: CPT

## 2024-04-21 PROCEDURE — 83735 ASSAY OF MAGNESIUM: CPT | Performed by: INTERNAL MEDICINE

## 2024-04-21 PROCEDURE — 93005 ELECTROCARDIOGRAM TRACING: CPT | Performed by: INTERNAL MEDICINE

## 2024-04-21 PROCEDURE — 80053 COMPREHEN METABOLIC PANEL: CPT | Performed by: STUDENT IN AN ORGANIZED HEALTH CARE EDUCATION/TRAINING PROGRAM

## 2024-04-21 PROCEDURE — 85730 THROMBOPLASTIN TIME PARTIAL: CPT | Performed by: PHYSICIAN ASSISTANT

## 2024-04-21 PROCEDURE — 99285 EMERGENCY DEPT VISIT HI MDM: CPT

## 2024-04-21 PROCEDURE — 83690 ASSAY OF LIPASE: CPT | Performed by: PHYSICIAN ASSISTANT

## 2024-04-21 PROCEDURE — 85025 COMPLETE CBC W/AUTO DIFF WBC: CPT | Performed by: STUDENT IN AN ORGANIZED HEALTH CARE EDUCATION/TRAINING PROGRAM

## 2024-04-21 PROCEDURE — 36415 COLL VENOUS BLD VENIPUNCTURE: CPT

## 2024-04-21 PROCEDURE — 71045 X-RAY EXAM CHEST 1 VIEW: CPT

## 2024-04-21 PROCEDURE — 84443 ASSAY THYROID STIM HORMONE: CPT | Performed by: PHYSICIAN ASSISTANT

## 2024-04-21 PROCEDURE — G0378 HOSPITAL OBSERVATION PER HR: HCPCS

## 2024-04-21 PROCEDURE — 93005 ELECTROCARDIOGRAM TRACING: CPT | Performed by: STUDENT IN AN ORGANIZED HEALTH CARE EDUCATION/TRAINING PROGRAM

## 2024-04-21 PROCEDURE — 85610 PROTHROMBIN TIME: CPT | Performed by: PHYSICIAN ASSISTANT

## 2024-04-21 PROCEDURE — 85520 HEPARIN ASSAY: CPT | Performed by: PHYSICIAN ASSISTANT

## 2024-04-21 PROCEDURE — 25010000002 HEPARIN (PORCINE) 25000-0.45 UT/250ML-% SOLUTION: Performed by: PHYSICIAN ASSISTANT

## 2024-04-21 PROCEDURE — 93010 ELECTROCARDIOGRAM REPORT: CPT | Performed by: INTERNAL MEDICINE

## 2024-04-21 PROCEDURE — 84439 ASSAY OF FREE THYROXINE: CPT | Performed by: PHYSICIAN ASSISTANT

## 2024-04-21 PROCEDURE — 83735 ASSAY OF MAGNESIUM: CPT | Performed by: PHYSICIAN ASSISTANT

## 2024-04-21 PROCEDURE — 71045 X-RAY EXAM CHEST 1 VIEW: CPT | Performed by: RADIOLOGY

## 2024-04-21 PROCEDURE — 83880 ASSAY OF NATRIURETIC PEPTIDE: CPT | Performed by: PHYSICIAN ASSISTANT

## 2024-04-21 PROCEDURE — 84484 ASSAY OF TROPONIN QUANT: CPT | Performed by: STUDENT IN AN ORGANIZED HEALTH CARE EDUCATION/TRAINING PROGRAM

## 2024-04-21 PROCEDURE — 99223 1ST HOSP IP/OBS HIGH 75: CPT | Performed by: INTERNAL MEDICINE

## 2024-04-21 PROCEDURE — 93005 ELECTROCARDIOGRAM TRACING: CPT | Performed by: PHYSICIAN ASSISTANT

## 2024-04-21 RX ORDER — DORZOLAMIDE HCL 20 MG/ML
1 SOLUTION/ DROPS OPHTHALMIC 3 TIMES DAILY
COMMUNITY

## 2024-04-21 RX ORDER — AMOXICILLIN 250 MG
2 CAPSULE ORAL 2 TIMES DAILY
Status: DISCONTINUED | OUTPATIENT
Start: 2024-04-21 | End: 2024-04-24 | Stop reason: HOSPADM

## 2024-04-21 RX ORDER — TIMOLOL MALEATE 5 MG/ML
1 SOLUTION/ DROPS OPHTHALMIC 2 TIMES DAILY
COMMUNITY

## 2024-04-21 RX ORDER — ASPIRIN 325 MG
325 TABLET ORAL ONCE
Status: COMPLETED | OUTPATIENT
Start: 2024-04-21 | End: 2024-04-21

## 2024-04-21 RX ORDER — BISACODYL 10 MG
10 SUPPOSITORY, RECTAL RECTAL DAILY PRN
Status: DISCONTINUED | OUTPATIENT
Start: 2024-04-21 | End: 2024-04-24 | Stop reason: HOSPADM

## 2024-04-21 RX ORDER — HEPARIN SODIUM 5000 [USP'U]/ML
2000 INJECTION, SOLUTION INTRAVENOUS; SUBCUTANEOUS AS NEEDED
Status: DISCONTINUED | OUTPATIENT
Start: 2024-04-21 | End: 2024-04-21

## 2024-04-21 RX ORDER — SODIUM CHLORIDE 9 MG/ML
40 INJECTION, SOLUTION INTRAVENOUS AS NEEDED
Status: DISCONTINUED | OUTPATIENT
Start: 2024-04-21 | End: 2024-04-24 | Stop reason: HOSPADM

## 2024-04-21 RX ORDER — NITROGLYCERIN 0.4 MG/1
0.4 TABLET SUBLINGUAL
Status: DISCONTINUED | OUTPATIENT
Start: 2024-04-21 | End: 2024-04-24 | Stop reason: HOSPADM

## 2024-04-21 RX ORDER — HEPARIN SODIUM 5000 [USP'U]/ML
4000 INJECTION, SOLUTION INTRAVENOUS; SUBCUTANEOUS AS NEEDED
Status: DISCONTINUED | OUTPATIENT
Start: 2024-04-21 | End: 2024-04-21

## 2024-04-21 RX ORDER — GLIPIZIDE 5 MG/1
5 TABLET ORAL
Status: ON HOLD | COMMUNITY
End: 2024-04-24

## 2024-04-21 RX ORDER — HEPARIN SODIUM 10000 [USP'U]/100ML
7 INJECTION, SOLUTION INTRAVENOUS
Status: DISCONTINUED | OUTPATIENT
Start: 2024-04-21 | End: 2024-04-23

## 2024-04-21 RX ORDER — VALSARTAN 40 MG/1
40 TABLET ORAL 2 TIMES DAILY
COMMUNITY
End: 2024-04-24 | Stop reason: HOSPADM

## 2024-04-21 RX ORDER — FENOFIBRATE 145 MG/1
145 TABLET, COATED ORAL DAILY
Status: CANCELLED | OUTPATIENT
Start: 2024-04-22

## 2024-04-21 RX ORDER — ASPIRIN 81 MG/1
81 TABLET, CHEWABLE ORAL DAILY
Status: DISCONTINUED | OUTPATIENT
Start: 2024-04-22 | End: 2024-04-22

## 2024-04-21 RX ORDER — HEPARIN SODIUM 5000 [USP'U]/ML
4000 INJECTION, SOLUTION INTRAVENOUS; SUBCUTANEOUS ONCE
Qty: 1 ML | Refills: 0 | Status: COMPLETED | OUTPATIENT
Start: 2024-04-21 | End: 2024-04-21

## 2024-04-21 RX ORDER — ATORVASTATIN CALCIUM 10 MG/1
10 TABLET, FILM COATED ORAL NIGHTLY
Status: CANCELLED | OUTPATIENT
Start: 2024-04-21

## 2024-04-21 RX ORDER — SODIUM CHLORIDE 0.9 % (FLUSH) 0.9 %
10 SYRINGE (ML) INJECTION AS NEEDED
Status: DISCONTINUED | OUTPATIENT
Start: 2024-04-21 | End: 2024-04-24 | Stop reason: HOSPADM

## 2024-04-21 RX ORDER — ASPIRIN 81 MG/1
81 TABLET ORAL EVERY MORNING
Status: CANCELLED | OUTPATIENT
Start: 2024-04-22

## 2024-04-21 RX ORDER — POLYETHYLENE GLYCOL 3350 17 G/17G
17 POWDER, FOR SOLUTION ORAL DAILY PRN
Status: DISCONTINUED | OUTPATIENT
Start: 2024-04-21 | End: 2024-04-24 | Stop reason: HOSPADM

## 2024-04-21 RX ORDER — LATANOPROST 50 UG/ML
1 SOLUTION/ DROPS OPHTHALMIC NIGHTLY
COMMUNITY

## 2024-04-21 RX ORDER — ASPIRIN 81 MG/1
81 TABLET ORAL DAILY
Status: DISCONTINUED | OUTPATIENT
Start: 2024-04-22 | End: 2024-04-23

## 2024-04-21 RX ORDER — ATORVASTATIN CALCIUM 40 MG/1
40 TABLET, FILM COATED ORAL NIGHTLY
Status: DISCONTINUED | OUTPATIENT
Start: 2024-04-21 | End: 2024-04-24 | Stop reason: HOSPADM

## 2024-04-21 RX ORDER — BISACODYL 5 MG/1
5 TABLET, DELAYED RELEASE ORAL DAILY PRN
Status: DISCONTINUED | OUTPATIENT
Start: 2024-04-21 | End: 2024-04-24 | Stop reason: HOSPADM

## 2024-04-21 RX ORDER — SODIUM CHLORIDE 0.9 % (FLUSH) 0.9 %
10 SYRINGE (ML) INJECTION EVERY 12 HOURS SCHEDULED
Status: DISCONTINUED | OUTPATIENT
Start: 2024-04-21 | End: 2024-04-24 | Stop reason: HOSPADM

## 2024-04-21 RX ORDER — DULOXETIN HYDROCHLORIDE 60 MG/1
60 CAPSULE, DELAYED RELEASE ORAL DAILY
COMMUNITY

## 2024-04-21 RX ADMIN — HEPARIN SODIUM 4000 UNITS: 5000 INJECTION INTRAVENOUS; SUBCUTANEOUS at 20:40

## 2024-04-21 RX ADMIN — HEPARIN SODIUM 11 UNITS/KG/HR: 10000 INJECTION, SOLUTION INTRAVENOUS at 20:42

## 2024-04-21 RX ADMIN — NITROGLYCERIN 1 INCH: 20 OINTMENT TOPICAL at 19:51

## 2024-04-21 RX ADMIN — ASPIRIN 325 MG: 325 TABLET ORAL at 17:42

## 2024-04-21 RX ADMIN — Medication 10 ML: at 21:45

## 2024-04-21 NOTE — Clinical Note
First balloon inflation max pressure = 18 alma delia. First balloon inflation duration = 13 seconds. Second inflation of balloon - Max pressure = 18 alma delia. 2nd Inflation of balloon - Duration = 6 seconds. 2nd inflation was done at 12:08 EDT. Third inflation of balloon - Max pressure = 18 alma delia. 3rd Inflation of balloon - Duration = 5 seconds. 3rd inflation was done at 12:09 EDT.

## 2024-04-21 NOTE — Clinical Note
First balloon inflation max pressure = 12 alma delia. First balloon inflation duration = 10 seconds. Second inflation of balloon - Max pressure = 18 alma delia. 2nd Inflation of balloon - Duration = 13 seconds.

## 2024-04-21 NOTE — Clinical Note
First balloon inflation max pressure = 9 alma delia. First balloon inflation duration = 5 seconds. Second inflation of balloon - Max pressure = 12 alma delia. 2nd Inflation of balloon - Duration = 11 seconds.

## 2024-04-21 NOTE — ED NOTES
MEDICAL SCREENING:    Reason for Visit: chest pain    Patient initially seen in triage.  The patient was advised further evaluation and diagnostic testing will be needed, some of the treatment and testing will be initiated in the lobby in order to begin the process.  The patient will be returned to the waiting area for the time being and possibly be re-assessed by a subsequent ED provider.  The patient will be brought back to the treatment area in as timely manner as possible.      Mary Machuca PA  04/21/24 [id number]

## 2024-04-21 NOTE — Clinical Note
First balloon inflation max pressure = 8 alma delia. First balloon inflation duration = 4 seconds. Second inflation of balloon - Max pressure = 12 alma delia. 2nd Inflation of balloon - Duration = 6 seconds. 2nd inflation was done at 11:58 EDT.

## 2024-04-21 NOTE — Clinical Note
Note written and signed by Dr Nelson. Attempted to contact patient- no answer and unable to leave a voicemail.   Report was  verbally given at bedside. .

## 2024-04-22 ENCOUNTER — APPOINTMENT (OUTPATIENT)
Dept: CARDIOLOGY | Facility: HOSPITAL | Age: 74
End: 2024-04-22
Payer: MEDICARE

## 2024-04-22 ENCOUNTER — APPOINTMENT (OUTPATIENT)
Dept: CT IMAGING | Facility: HOSPITAL | Age: 74
End: 2024-04-22
Payer: MEDICARE

## 2024-04-22 LAB
ANION GAP SERPL CALCULATED.3IONS-SCNC: 9.2 MMOL/L (ref 5–15)
BASOPHILS # BLD AUTO: 0.03 10*3/MM3 (ref 0–0.2)
BASOPHILS NFR BLD AUTO: 0.4 % (ref 0–1.5)
BH CV ECHO MEAS - ACS: 1.7 CM
BH CV ECHO MEAS - AI P1/2T: 634.2 MSEC
BH CV ECHO MEAS - AO MAX PG: 4.5 MMHG
BH CV ECHO MEAS - AO MEAN PG: 3 MMHG
BH CV ECHO MEAS - AO ROOT DIAM: 3.8 CM
BH CV ECHO MEAS - AO V2 MAX: 106 CM/SEC
BH CV ECHO MEAS - AO V2 VTI: 22 CM
BH CV ECHO MEAS - EDV(CUBED): 64 ML
BH CV ECHO MEAS - EDV(MOD-SP4): 68 ML
BH CV ECHO MEAS - EF(MOD-SP4): 67.5 %
BH CV ECHO MEAS - ESV(CUBED): 17.6 ML
BH CV ECHO MEAS - ESV(MOD-SP4): 22.1 ML
BH CV ECHO MEAS - FS: 35 %
BH CV ECHO MEAS - IVS/LVPW: 1.09 CM
BH CV ECHO MEAS - IVSD: 1.2 CM
BH CV ECHO MEAS - LA DIMENSION: 3.9 CM
BH CV ECHO MEAS - LAT PEAK E' VEL: 5.6 CM/SEC
BH CV ECHO MEAS - LV DIASTOLIC VOL/BSA (35-75): 32.2 CM2
BH CV ECHO MEAS - LV MASS(C)D: 155.4 GRAMS
BH CV ECHO MEAS - LV SYSTOLIC VOL/BSA (12-30): 10.5 CM2
BH CV ECHO MEAS - LVIDD: 4 CM
BH CV ECHO MEAS - LVIDS: 2.6 CM
BH CV ECHO MEAS - LVOT AREA: 3.5 CM2
BH CV ECHO MEAS - LVOT DIAM: 2.1 CM
BH CV ECHO MEAS - LVPWD: 1.1 CM
BH CV ECHO MEAS - MED PEAK E' VEL: 4.8 CM/SEC
BH CV ECHO MEAS - MV A MAX VEL: 95.5 CM/SEC
BH CV ECHO MEAS - MV E MAX VEL: 79 CM/SEC
BH CV ECHO MEAS - MV E/A: 0.83
BH CV ECHO MEAS - PA ACC TIME: 0.1 SEC
BH CV ECHO MEAS - RAP SYSTOLE: 10 MMHG
BH CV ECHO MEAS - RVSP: 25.2 MMHG
BH CV ECHO MEAS - SV(MOD-SP4): 45.9 ML
BH CV ECHO MEAS - SVI(MOD-SP4): 21.8 ML/M2
BH CV ECHO MEAS - TAPSE (>1.6): 2.13 CM
BH CV ECHO MEAS - TR MAX PG: 15.2 MMHG
BH CV ECHO MEAS - TR MAX VEL: 195 CM/SEC
BH CV ECHO MEASUREMENTS AVERAGE E/E' RATIO: 15.19
BUN SERPL-MCNC: 17 MG/DL (ref 8–23)
BUN/CREAT SERPL: 15.3 (ref 7–25)
CALCIUM SPEC-SCNC: 8.9 MG/DL (ref 8.6–10.5)
CHLORIDE SERPL-SCNC: 106 MMOL/L (ref 98–107)
CHOLEST SERPL-MCNC: 119 MG/DL (ref 0–200)
CHOLEST SERPL-MCNC: 120 MG/DL (ref 0–200)
CO2 SERPL-SCNC: 24.8 MMOL/L (ref 22–29)
CREAT SERPL-MCNC: 1.11 MG/DL (ref 0.76–1.27)
DEPRECATED RDW RBC AUTO: 40.1 FL (ref 37–54)
EGFRCR SERPLBLD CKD-EPI 2021: 70.1 ML/MIN/1.73
EOSINOPHIL # BLD AUTO: 0.24 10*3/MM3 (ref 0–0.4)
EOSINOPHIL NFR BLD AUTO: 3.5 % (ref 0.3–6.2)
ERYTHROCYTE [DISTWIDTH] IN BLOOD BY AUTOMATED COUNT: 12.3 % (ref 12.3–15.4)
GLUCOSE BLDC GLUCOMTR-MCNC: 133 MG/DL (ref 70–130)
GLUCOSE BLDC GLUCOMTR-MCNC: 165 MG/DL (ref 70–130)
GLUCOSE BLDC GLUCOMTR-MCNC: 196 MG/DL (ref 70–130)
GLUCOSE BLDC GLUCOMTR-MCNC: 251 MG/DL (ref 70–130)
GLUCOSE SERPL-MCNC: 194 MG/DL (ref 65–99)
HBA1C MFR BLD: 9.4 % (ref 4.8–5.6)
HCT VFR BLD AUTO: 37.8 % (ref 37.5–51)
HDLC SERPL-MCNC: 29 MG/DL (ref 40–60)
HGB BLD-MCNC: 12.7 G/DL (ref 13–17.7)
IMM GRANULOCYTES # BLD AUTO: 0.04 10*3/MM3 (ref 0–0.05)
IMM GRANULOCYTES NFR BLD AUTO: 0.6 % (ref 0–0.5)
LDLC SERPL CALC-MCNC: 62 MG/DL (ref 0–100)
LDLC/HDLC SERPL: 1.97 {RATIO}
LYMPHOCYTES # BLD AUTO: 3.1 10*3/MM3 (ref 0.7–3.1)
LYMPHOCYTES NFR BLD AUTO: 45.4 % (ref 19.6–45.3)
MAGNESIUM SERPL-MCNC: 1.7 MG/DL (ref 1.6–2.4)
MAGNESIUM SERPL-MCNC: 1.9 MG/DL (ref 1.6–2.4)
MCH RBC QN AUTO: 30.3 PG (ref 26.6–33)
MCHC RBC AUTO-ENTMCNC: 33.6 G/DL (ref 31.5–35.7)
MCV RBC AUTO: 90.2 FL (ref 79–97)
MONOCYTES # BLD AUTO: 0.68 10*3/MM3 (ref 0.1–0.9)
MONOCYTES NFR BLD AUTO: 10 % (ref 5–12)
NEUTROPHILS NFR BLD AUTO: 2.74 10*3/MM3 (ref 1.7–7)
NEUTROPHILS NFR BLD AUTO: 40.1 % (ref 42.7–76)
NRBC BLD AUTO-RTO: 0 /100 WBC (ref 0–0.2)
PLATELET # BLD AUTO: 171 10*3/MM3 (ref 140–450)
PMV BLD AUTO: 9.9 FL (ref 6–12)
POTASSIUM SERPL-SCNC: 4.1 MMOL/L (ref 3.5–5.2)
RBC # BLD AUTO: 4.19 10*6/MM3 (ref 4.14–5.8)
SODIUM SERPL-SCNC: 140 MMOL/L (ref 136–145)
TRIGL SERPL-MCNC: 169 MG/DL (ref 0–150)
TROPONIN T SERPL HS-MCNC: 129 NG/L
UFH PPP CHRO-ACNC: 0.34 IU/ML (ref 0.3–0.7)
UFH PPP CHRO-ACNC: 0.44 IU/ML (ref 0.3–0.7)
VLDLC SERPL-MCNC: 29 MG/DL (ref 5–40)
WBC NRBC COR # BLD AUTO: 6.83 10*3/MM3 (ref 3.4–10.8)

## 2024-04-22 PROCEDURE — 93005 ELECTROCARDIOGRAM TRACING: CPT | Performed by: INTERNAL MEDICINE

## 2024-04-22 PROCEDURE — 85025 COMPLETE CBC W/AUTO DIFF WBC: CPT | Performed by: INTERNAL MEDICINE

## 2024-04-22 PROCEDURE — 84484 ASSAY OF TROPONIN QUANT: CPT | Performed by: INTERNAL MEDICINE

## 2024-04-22 PROCEDURE — 93010 ELECTROCARDIOGRAM REPORT: CPT | Performed by: INTERNAL MEDICINE

## 2024-04-22 PROCEDURE — 83735 ASSAY OF MAGNESIUM: CPT | Performed by: INTERNAL MEDICINE

## 2024-04-22 PROCEDURE — 82948 REAGENT STRIP/BLOOD GLUCOSE: CPT

## 2024-04-22 PROCEDURE — 85520 HEPARIN ASSAY: CPT

## 2024-04-22 PROCEDURE — 36415 COLL VENOUS BLD VENIPUNCTURE: CPT

## 2024-04-22 PROCEDURE — 25010000002 HEPARIN (PORCINE) 25000-0.45 UT/250ML-% SOLUTION: Performed by: INTERNAL MEDICINE

## 2024-04-22 PROCEDURE — 99232 SBSQ HOSP IP/OBS MODERATE 35: CPT

## 2024-04-22 PROCEDURE — 80048 BASIC METABOLIC PNL TOTAL CA: CPT | Performed by: INTERNAL MEDICINE

## 2024-04-22 PROCEDURE — 80061 LIPID PANEL: CPT | Performed by: INTERNAL MEDICINE

## 2024-04-22 PROCEDURE — 83036 HEMOGLOBIN GLYCOSYLATED A1C: CPT | Performed by: INTERNAL MEDICINE

## 2024-04-22 PROCEDURE — 93306 TTE W/DOPPLER COMPLETE: CPT

## 2024-04-22 PROCEDURE — 75574 CT ANGIO HRT W/3D IMAGE: CPT | Performed by: RADIOLOGY

## 2024-04-22 PROCEDURE — 99222 1ST HOSP IP/OBS MODERATE 55: CPT | Performed by: SPECIALIST

## 2024-04-22 PROCEDURE — 63710000001 INSULIN GLARGINE PER 5 UNITS: Performed by: INTERNAL MEDICINE

## 2024-04-22 PROCEDURE — 82465 ASSAY BLD/SERUM CHOLESTEROL: CPT | Performed by: INTERNAL MEDICINE

## 2024-04-22 PROCEDURE — 25510000001 IOPAMIDOL PER 1 ML: Performed by: INTERNAL MEDICINE

## 2024-04-22 PROCEDURE — 75574 CT ANGIO HRT W/3D IMAGE: CPT

## 2024-04-22 PROCEDURE — 93306 TTE W/DOPPLER COMPLETE: CPT | Performed by: INTERNAL MEDICINE

## 2024-04-22 PROCEDURE — 25810000003 SODIUM CHLORIDE 0.9 % SOLUTION: Performed by: INTERNAL MEDICINE

## 2024-04-22 RX ORDER — ACETAMINOPHEN 325 MG/1
650 TABLET ORAL EVERY 6 HOURS PRN
Status: DISCONTINUED | OUTPATIENT
Start: 2024-04-22 | End: 2024-04-23 | Stop reason: SDUPTHER

## 2024-04-22 RX ORDER — GABAPENTIN 100 MG/1
100 CAPSULE ORAL 2 TIMES DAILY
Status: DISCONTINUED | OUTPATIENT
Start: 2024-04-22 | End: 2024-04-24 | Stop reason: HOSPADM

## 2024-04-22 RX ORDER — VALSARTAN 80 MG/1
40 TABLET ORAL 2 TIMES DAILY
Status: CANCELLED | OUTPATIENT
Start: 2024-04-22

## 2024-04-22 RX ORDER — DORZOLAMIDE HCL 20 MG/ML
1 SOLUTION/ DROPS OPHTHALMIC 3 TIMES DAILY
Status: DISCONTINUED | OUTPATIENT
Start: 2024-04-22 | End: 2024-04-24 | Stop reason: HOSPADM

## 2024-04-22 RX ORDER — BRIMONIDINE TARTRATE 2 MG/ML
1 SOLUTION/ DROPS OPHTHALMIC 2 TIMES DAILY
Status: DISCONTINUED | OUTPATIENT
Start: 2024-04-22 | End: 2024-04-24 | Stop reason: HOSPADM

## 2024-04-22 RX ORDER — LATANOPROST 50 UG/ML
1 SOLUTION/ DROPS OPHTHALMIC NIGHTLY
Status: DISCONTINUED | OUTPATIENT
Start: 2024-04-22 | End: 2024-04-24 | Stop reason: HOSPADM

## 2024-04-22 RX ORDER — SODIUM CHLORIDE 9 MG/ML
100 INJECTION, SOLUTION INTRAVENOUS CONTINUOUS
Status: DISCONTINUED | OUTPATIENT
Start: 2024-04-22 | End: 2024-04-23

## 2024-04-22 RX ORDER — GLIPIZIDE 5 MG/1
5 TABLET ORAL
Status: CANCELLED | OUTPATIENT
Start: 2024-04-22

## 2024-04-22 RX ORDER — MULTIPLE VITAMINS W/ MINERALS TAB 9MG-400MCG
1 TAB ORAL EVERY MORNING
Status: DISCONTINUED | OUTPATIENT
Start: 2024-04-22 | End: 2024-04-24 | Stop reason: HOSPADM

## 2024-04-22 RX ORDER — TIMOLOL MALEATE 5 MG/ML
1 SOLUTION/ DROPS OPHTHALMIC 2 TIMES DAILY
Status: DISCONTINUED | OUTPATIENT
Start: 2024-04-22 | End: 2024-04-24 | Stop reason: HOSPADM

## 2024-04-22 RX ORDER — ACETAMINOPHEN 325 MG/1
650 TABLET ORAL ONCE
Status: COMPLETED | OUTPATIENT
Start: 2024-04-22 | End: 2024-04-22

## 2024-04-22 RX ORDER — NICOTINE POLACRILEX 4 MG
15 LOZENGE BUCCAL
Status: DISCONTINUED | OUTPATIENT
Start: 2024-04-22 | End: 2024-04-24 | Stop reason: HOSPADM

## 2024-04-22 RX ORDER — DULOXETIN HYDROCHLORIDE 60 MG/1
60 CAPSULE, DELAYED RELEASE ORAL DAILY
Status: DISCONTINUED | OUTPATIENT
Start: 2024-04-22 | End: 2024-04-24 | Stop reason: HOSPADM

## 2024-04-22 RX ORDER — DEXTROSE MONOHYDRATE 25 G/50ML
25 INJECTION, SOLUTION INTRAVENOUS
Status: DISCONTINUED | OUTPATIENT
Start: 2024-04-22 | End: 2024-04-24 | Stop reason: HOSPADM

## 2024-04-22 RX ORDER — GLUCAGON 1 MG/ML
1 KIT INJECTION
Status: DISCONTINUED | OUTPATIENT
Start: 2024-04-22 | End: 2024-04-24 | Stop reason: HOSPADM

## 2024-04-22 RX ADMIN — BRIMONIDINE TARTRATE 1 DROP: 2 SOLUTION/ DROPS OPHTHALMIC at 20:28

## 2024-04-22 RX ADMIN — GABAPENTIN 100 MG: 100 CAPSULE ORAL at 20:28

## 2024-04-22 RX ADMIN — IOPAMIDOL 92 ML: 755 INJECTION, SOLUTION INTRAVENOUS at 13:36

## 2024-04-22 RX ADMIN — LATANOPROST 1 DROP: 50 SOLUTION OPHTHALMIC at 20:28

## 2024-04-22 RX ADMIN — DORZOLAMIDE HCL 1 DROP: 20 SOLUTION/ DROPS OPHTHALMIC at 16:29

## 2024-04-22 RX ADMIN — ACETAMINOPHEN 650 MG: 325 TABLET ORAL at 04:12

## 2024-04-22 RX ADMIN — SODIUM CHLORIDE 100 ML/HR: 9 INJECTION, SOLUTION INTRAVENOUS at 13:37

## 2024-04-22 RX ADMIN — GABAPENTIN 100 MG: 100 CAPSULE ORAL at 08:27

## 2024-04-22 RX ADMIN — DULOXETINE HYDROCHLORIDE 60 MG: 60 CAPSULE, DELAYED RELEASE ORAL at 08:27

## 2024-04-22 RX ADMIN — HEPARIN SODIUM 11 UNITS/KG/HR: 10000 INJECTION, SOLUTION INTRAVENOUS at 22:01

## 2024-04-22 RX ADMIN — TIMOLOL MALEATE 1 DROP: 5 SOLUTION OPHTHALMIC at 08:28

## 2024-04-22 RX ADMIN — BRIMONIDINE TARTRATE 1 DROP: 2 SOLUTION/ DROPS OPHTHALMIC at 08:28

## 2024-04-22 RX ADMIN — DORZOLAMIDE HCL 1 DROP: 20 SOLUTION/ DROPS OPHTHALMIC at 08:28

## 2024-04-22 RX ADMIN — ATORVASTATIN CALCIUM 40 MG: 40 TABLET, FILM COATED ORAL at 00:10

## 2024-04-22 RX ADMIN — DORZOLAMIDE HCL 1 DROP: 20 SOLUTION/ DROPS OPHTHALMIC at 20:28

## 2024-04-22 RX ADMIN — TIMOLOL MALEATE 1 DROP: 5 SOLUTION OPHTHALMIC at 20:28

## 2024-04-22 RX ADMIN — ACETAMINOPHEN 650 MG: 325 TABLET ORAL at 16:29

## 2024-04-22 RX ADMIN — Medication 10 ML: at 08:28

## 2024-04-22 RX ADMIN — ATORVASTATIN CALCIUM 40 MG: 40 TABLET, FILM COATED ORAL at 20:28

## 2024-04-22 RX ADMIN — Medication 1 TABLET: at 08:27

## 2024-04-22 RX ADMIN — INSULIN GLARGINE 10 UNITS: 100 INJECTION, SOLUTION SUBCUTANEOUS at 08:27

## 2024-04-22 RX ADMIN — ASPIRIN 81 MG: 81 TABLET, COATED ORAL at 08:27

## 2024-04-22 RX ADMIN — MAGNESIUM GLUCONATE 500 MG ORAL TABLET 800 MG: 500 TABLET ORAL at 04:12

## 2024-04-22 RX ADMIN — NITROGLYCERIN 0.4 MG: 0.4 TABLET, ORALLY DISINTEGRATING SUBLINGUAL at 13:30

## 2024-04-22 RX ADMIN — SODIUM CHLORIDE 100 ML/HR: 9 INJECTION, SOLUTION INTRAVENOUS at 16:29

## 2024-04-22 RX ADMIN — Medication 10 ML: at 20:28

## 2024-04-22 NOTE — PLAN OF CARE
Goal Outcome Evaluation:     Pt admitted this shift from ER. Pt is currently resting with wife at bedside. Heparin gtt infusing at 11u/kg/hr. NPO for cardiology consult. VSS. Plan of care ongoing

## 2024-04-22 NOTE — ED NOTES
Rec'd call from pharmacy who states to start heparin infusion at 11 units/kg/hr with a 4000 unit bolus.

## 2024-04-22 NOTE — CONSULTS
Date of Admit: 4/21/2024  Date of Consult: 04/22/24  Provider, No Known        NSTEMI (non-ST elevated myocardial infarction)      Assessment    Intermittent chest pain with typical atypical features with slightly elevated high-sensitivity troponin consistent with NSTEMI type I versus type II  Essential hypertension  Diabetes mellitus type 2 with diabetic retinopathy  Dyslipidemia        Recommendations   1.  Will proceed with CT scan of the heart for assessment of the coronary anatomy  2.  Continue with the statin  3.  For now continue with aspirin and heparin pending CT CT of the heart  4.  Blood pressure control continue current management        Reason for consultation: NSTEMI    Subjective       Subjective       Hector Johnston Jr. is a 74-year-old male with a past medical history significant for hypertension, diabetes mellitus type 2, and diabetic retinopathy.  He reports over the past 2 to 3 weeks he has been having intermittent chest pressure in the epigastric region.  This usually occurs about 30 minutes after eating lunch.  He reports he is fairly physically active but has not noticed any chest pain with exertion.  Initial high-sensitivity troponin in the ED was 54 with subsequent troponin 60 and 129.  An echocardiogram has been ordered but pending.  Creatinine 1.1.  Potassium 4.1.  Aspirin and statin were ordered and patient was placed on a heparin drip.  Cardiology was consulted for non-STEMI.  Of note, patient did have a left heart catheterization in 2018 by Dr. Jose Dickerson at [facility] which revealed normal coronary arteries.  Upon examination today, the patient denies any active chest pains.  He is diabetic with history of hypertension.  He is a non-smoker.    Cardiac risk factors:diabetes mellitus and hypertension    Last Cath: 5/4/18     Referring Provider: Cecy Gorman M.D.     Procedures Performed: Left heart catheterization with left ventriculography and bilateral selective  "coronary angiography.  Closure the right common femoral artery with a 6 Polish VIP Angio-Seal device.     Indications: The patient is a 68-year-old hypertensive dyslipidemic diabetic who underwent a coronary calcium score screening and this was abnormal.  A subsequent exercise test was remarkable for a duration of only 2 minutes before the patient had to stop because of extreme fatigue and 1 mm ST segment depression was noted (without chest pain) at the end of the test.  Because of the patient's risk factors and \"high risk\" EKG exercise stress test, catheterization with intervention standby was elected.     Procedure Narrative: Study was done on the right common femoral artery with 6 Polish catheter system and Maria E angiography.  Following the completion of this procedure and the review of an acceptable vascular access site angiogram, the right common femoral artery was closed with 6 Polish VIP Angio-Seal device.  There were no complications.  Details are noted elsewhere.     Complications:  There were no signs of early complications.     Hemodynamic Findings:  AO pressure: 100/60 mmHg  LVpressure: 100/5 mmHg     Angiographic Findings:  Left Ventriculography:  The left ventricle was filmed in a single plane, 30' GOMEZ projection.  Subjectively, this chamber is normal in size.  I cannot identify any global or segmental wall motion abnormalities.  There is no mitral regurgitation.  There is no mitral valve prolapse.  The estimated left ventricular ejection fraction is 65%.  This is a normal left ventriculogram.        Selective Coronary Angiography:  LM:  Normal.  LAD: This vessel gives rise to several very small diagonal arteries along its course.  The LAD and its branches are angiographically normal.  LCX: This is a large dominant vessel gives rise to several marginal arteries over the posterior left ventricular wall before terminating in the posterior interventricular groove is a left PDA.  This vessel is " angiographically normal.  RCA: This is an angiographically normal small nondominant vessel.     Final Impression: Normal left ventricular function and angiographically normal coronary arteries.      Past Medical History:   Diagnosis Date    Bilateral pseudophakia 12/29/2020    Diabetes mellitus     Epiretinal membrane (ERM) of left eye 03/03/2021    Hernia, inguinal, left 04/21/2024    Hypertension     Primary open-angle glaucoma 12/29/2020    Vitreous prolapse, left eye 03/03/2021     Past Surgical History:   Procedure Laterality Date    CARDIAC CATHETERIZATION N/A 5/4/2018    Procedure: Left Heart Cath;  Surgeon: Jose Dickerson MD;  Location: [facility] CATH INVASIVE LOCATION;  Service: Cardiovascular    CHOLECYSTECTOMY      MOUTH SURGERY       Family History   Problem Relation Age of Onset    No Known Problems Father     No Known Problems Mother      Social History     Tobacco Use    Smoking status: Former    Smokeless tobacco: Never   Vaping Use    Vaping status: Never Used   Substance Use Topics    Alcohol use: No    Drug use: No     Medications Prior to Admission   Medication Sig Dispense Refill Last Dose    aspirin 81 MG EC tablet Take 1 tablet by mouth Every Morning.   4/21/2024    atorvastatin (LIPITOR) 10 MG tablet Take 1 tablet by mouth Every Night.   4/20/2024    brimonidine (ALPHAGAN P) 0.1 % solution ophthalmic solution Administer 1 drop into the left eye 2 (Two) Times a Day.   4/21/2024    dorzolamide (TRUSOPT) 2 % ophthalmic solution Administer 1 drop into the left eye 3 (Three) Times a Day.   4/21/2024    DULoxetine (CYMBALTA) 60 MG capsule Take 1 capsule by mouth Daily.   4/21/2024    fenofibrate micronized (LOFIBRA) 134 MG capsule Take 1 capsule by mouth Every Morning Before Breakfast.   4/21/2024    gabapentin (NEURONTIN) 100 MG capsule Take 1 capsule by mouth 2 (Two) Times a Day.   4/21/2024    glimepiride (AMARYL) 4 MG tablet Take 1 tablet by mouth 2 (Two) Times a Day. 180 tablet 3 4/21/2024     glipizide (GLUCOTROL) 5 MG tablet Take 1 tablet by mouth 2 (Two) Times a Day Before Meals.   4/21/2024    Lantus SoloStar 100 UNIT/ML injection pen Inject 30 Units under the skin into the appropriate area as directed Daily.   4/21/2024    latanoprost (XALATAN) 0.005 % ophthalmic solution Administer 1 drop into the left eye Every Night.   4/20/2024    metFORMIN (GLUCOPHAGE) 1000 MG tablet Take 1 tablet by mouth 2 (Two) Times a Day With Meals.   4/21/2024    Multiple Vitamins-Minerals (MULTIVITAMIN ADULT PO) Take 1 tablet by mouth Every Morning.   4/21/2024    timolol (TIMOPTIC) 0.5 % ophthalmic solution Administer 1 drop to both eyes 2 (Two) Times a Day.   4/21/2024    valsartan (DIOVAN) 40 MG tablet Take 1 tablet by mouth 2 (Two) Times a Day.   4/21/2024    glucose blood test strip Use to test blood sugar 3 times a day & as needed 100 each 5     TRUEPLUS LANCETS 33G misc Use to test blood sugar 3 times a day & as needed 100 each 5      Allergies:  Patient has no known allergies.    Review of Systems   Constitutional:  Negative for chills, fatigue and fever.   HENT:  Negative for congestion, ear pain, rhinorrhea and sore throat.    Respiratory:  Negative for cough, shortness of breath and wheezing.    Cardiovascular:  Positive for chest pain. Negative for palpitations and leg swelling.   Gastrointestinal:  Negative for abdominal pain, diarrhea, nausea and vomiting.   Genitourinary:  Negative for dysuria and urgency.   Musculoskeletal:  Negative for back pain and myalgias.   Skin:  Negative for rash and wound.   Neurological:  Negative for dizziness, light-headedness and headaches.   Psychiatric/Behavioral:  Negative for sleep disturbance. The patient is not nervous/anxious.        Objective       Objective      Vital Signs  Temp:  [97.7 °F (36.5 °C)-98.1 °F (36.7 °C)] 97.9 °F (36.6 °C)  Heart Rate:  [58-80] 67  Resp:  [16-18] 18  BP: ()/(54-93) 115/65  Vital Signs (last 72 hrs)         04/19 0700  04/20 0659  04/20 0700  04/21 0659 04/21 0700  04/22 0659 04/22 0700  04/22 1106   Most Recent      Temp (°F)     97.7 -  98.1      97.9     97.9 (36.6) 04/22 0712    Heart Rate     58 -  80      67     67 04/22 0712    Resp     16 -  18      18     18 04/22 0712    BP     95/54 -  154/93      115/65     115/65 04/22 0712    SpO2 (%)     94 -  98      97     97 04/22 0712          Body mass index is 27.98 kg/m².  Documented weights    04/21/24 1605 04/21/24 2132 04/22/24 0500   Weight: 90.7 kg (200 lb) 91 kg (200 lb 9.9 oz) 91 kg (200 lb 9.9 oz)          No intake or output data in the 24 hours ending 04/22/24 1106  Physical Exam  Constitutional:       Appearance: Normal appearance. He is well-developed.   HENT:      Head: Normocephalic and atraumatic.   Cardiovascular:      Rate and Rhythm: Normal rate and regular rhythm.      Heart sounds: Normal heart sounds.   Pulmonary:      Effort: Pulmonary effort is normal.      Breath sounds: Normal breath sounds.   Abdominal:      General: Bowel sounds are normal.      Palpations: Abdomen is soft.   Skin:     General: Skin is warm and dry.   Neurological:      Mental Status: He is alert and oriented to person, place, and time.   Psychiatric:         Behavior: Behavior normal.             Results review     Results Review:    I reviewed the patient's new clinical results.  Results from last 7 days   Lab Units 04/22/24  0528 04/21/24  1820 04/21/24  1623   HSTROP T ng/L 129* 60* 54*     Results from last 7 days   Lab Units 04/22/24  0223 04/21/24  1623   WBC 10*3/mm3 6.83 6.34   HEMOGLOBIN g/dL 12.7* 13.9   PLATELETS 10*3/mm3 171 203     Results from last 7 days   Lab Units 04/22/24  0528 04/21/24  1623   SODIUM mmol/L 140 139   POTASSIUM mmol/L 4.1 4.8   CHLORIDE mmol/L 106 105   CO2 mmol/L 24.8 24.8   BUN mg/dL 17 21   CREATININE mg/dL 1.11 1.29*   CALCIUM mg/dL 8.9 9.7   GLUCOSE mg/dL 194* 244*   ALT (SGPT) U/L  --  25   AST (SGOT) U/L  --  25     Lab Results   Component Value Date     INR 0.86 (L) 04/21/2024     Lab Results   Component Value Date    MG 1.7 04/22/2024    MG 1.9 04/21/2024    MG 1.8 04/21/2024     Lab Results   Component Value Date    TSH 1.800 04/21/2024    PSA 1.380 04/26/2017    TRIG 169 (H) 04/22/2024    HDL 29 (L) 04/22/2024    LDL 62 04/22/2024      Lab Results   Component Value Date    PROBNP 429.1 04/21/2024    PROBNP 68.2 01/17/2021       ECG            ECG/EMG Results (last 24 hours)       Procedure Component Value Units Date/Time    ECG 12 Lead ED Triage Standing Order; Chest Pain [[id number]] Collected: 04/21/24 1603     Updated: 04/21/24 1921     QT Interval 366 ms      QTC Interval 408 ms     Narrative:      Test Reason : ED Triage Standing Order~  Blood Pressure :   */*   mmHG  Vent. Rate :  75 BPM     Atrial Rate :  75 BPM     P-R Int : 150 ms          QRS Dur :  76 ms      QT Int : 366 ms       P-R-T Axes :  56 -14  51 degrees     QTc Int : 408 ms    Normal sinus rhythm  Nonspecific ST abnormality  Abnormal ECG  When compared with ECG of 11-FEB-2022 05:27,  ST now depressed in Anterior leads  Confirmed by Jenna Caal MD (2023) on 4/21/2024 7:20:04 PM    Referred By: СЕРГЕЙ           Confirmed By: Jenna Caal MD    ECG 12 Lead Chest Pain [[id number]] Collected: 04/21/24 1812     Updated: 04/21/24 1923     QT Interval 398 ms      QTC Interval 413 ms     Narrative:      Test Reason : Chest Pain  Blood Pressure :   */*   mmHG  Vent. Rate :  65 BPM     Atrial Rate :  65 BPM     P-R Int : 164 ms          QRS Dur :  78 ms      QT Int : 398 ms       P-R-T Axes :  50 -16  36 degrees     QTc Int : 413 ms    Normal sinus rhythm with sinus arrhythmia  Nonspecific T wave abnormality  Abnormal ECG  When compared with ECG of 21-APR-2024 16:03, (Unconfirmed)  Inverted T waves have replaced nonspecific T wave abnormality in Anterior leads  Confirmed by Jenna Caal MD (2023) on 4/21/2024 7:20:58 PM    Referred By: СЕРГЕЙ           Confirmed By: Jenna Caal MD    ECG 12 Lead  Chest Pain [[id number]] Collected: 04/22/24 0333     Updated: 04/22/24 0335     QT Interval 426 ms      QTC Interval 443 ms     Narrative:      Test Reason : Chest Pain  Blood Pressure :   */*   mmHG  Vent. Rate :  65 BPM     Atrial Rate :  65 BPM     P-R Int : 148 ms          QRS Dur :  78 ms      QT Int : 426 ms       P-R-T Axes :  42   3  77 degrees     QTc Int : 443 ms    Normal sinus rhythm  T wave abnormality, consider anterior ischemia  Abnormal ECG  When compared with ECG of 21-APR-2024 18:12,  Nonspecific T wave abnormality, worse in Lateral leads    Referred By:            Confirmed By:     Telemetry Scan [[id number]] Resulted: 04/21/24     Updated: 04/22/24 0621    ECG 12 Lead Chest Pain [[id number]] Collected: 04/22/24 0918     Updated: 04/22/24 0920     QT Interval 416 ms      QTC Interval 439 ms     Narrative:      Test Reason : Chest Pain  Blood Pressure :   */*   mmHG  Vent. Rate :  67 BPM     Atrial Rate :  67 BPM     P-R Int : 156 ms          QRS Dur :  86 ms      QT Int : 416 ms       P-R-T Axes :  60 -27 114 degrees     QTc Int : 439 ms    Normal sinus rhythm  Nonspecific T wave abnormality  Abnormal ECG  When compared with ECG of 22-APR-2024 03:33, (Unconfirmed)  QRS axis shifted left    Referred By: TORRES           Confirmed By:             Imaging Results (Last 72 Hours)       Procedure Component Value Units Date/Time    XR Chest 1 View [[id number]] Collected: 04/21/24 1738     Updated: 04/21/24 1740    Narrative:      INDICATION: Chest pain.     TECHNIQUE: Frontal radiograph of the chest.     COMPARISON: 1/14/2021.     FINDINGS:   Elevation of the left hemidiaphragm. Cardiomegaly. Chronic appearing  interstitial lung markings. Pulmonary vasculature appear within normal  limits. No infiltrate, pleural effusion or pneumothorax. No acute  osseous abnormality evident.       Impression:      No acute cardiopulmonary process.        This report was finalized on 4/21/2024 5:38 PM by Alex Pallas,  DO.               I have discussed my impression and recommendations with the patient and family.    Thank you very much for asking us to be involved in this patient's care.  We will follow along with you.  Electronically signed by EDMOND Sofia, 04/22/24, 11:11 AM EDT.  Electronically signed by Prudencio Brown MD, 04/22/24, 11:14 AM EDT.        Please note that portions of this note were completed with a voice recognition program.

## 2024-04-22 NOTE — H&P
[facility]    Patient Identification:  Name:  Hector Johnston Jr.  Age:  74 y.o.  Sex:  male  :  1950  MRN:  [id number]   Visit Number:  [id number]  Admit Date: 2024   Room number:  3314/1S  Primary Care Physician:  MondayAnastacia APRN    Date of Admission: 2024     Subjective     Chief complaint:    Chief Complaint   Patient presents with    Chest Pain     History of presenting illness:  74 y.o. male who was admitted on 2024 from the ED with chest pain.  The patient has a past medical history of type 2 diabetes mellitus, essential hypertension, and diabetic retinopathy; he does not have a cardiac history.  The patient states that that he was in his normal state of health but about 3 weeks ago he started having intermittent chest pressure.  At first, he thought it was heartburn as it improved with medication.  However, he now is have more frequent episodes and longer episodes.  The chest pain is pressure and associated with nausea, shortness of air, and dizziness.  Activity does seem to make this worse.  He denies any pedal edema.  In the ED, the initial high sensitivity troponin was 54 and then 2 hours later it increased to 60.  He was given a chewable aspirin and then he was admitted to the telemetry floor for further evaluation.  ---------------------------------------------------------------------------------------------------------------------   Review of Systems   Constitutional:  Positive for fatigue. Negative for chills, diaphoresis and fever.   HENT:  Negative for congestion and rhinorrhea.    Eyes:  Negative for discharge and redness.   Respiratory:  Negative for cough and shortness of breath.    Cardiovascular:  Positive for chest pain. Negative for leg swelling.   Gastrointestinal:  Positive for nausea. Negative for diarrhea and vomiting.   Genitourinary:  Negative for dysuria and hematuria.   Musculoskeletal:  Positive for  arthralgias (Chronic shoulder; gets steroids shots). Negative for joint swelling and myalgias.        Left leg pain, buttocks to knee, posterior, sharp, starts when he lays down to go to sleep.  No calf pain.   Skin:  Negative for pallor, rash and wound.   Neurological:  Positive for dizziness and headaches. Negative for syncope, facial asymmetry, speech difficulty and numbness.   Psychiatric/Behavioral:  Negative for agitation, behavioral problems and confusion.      ---------------------------------------------------------------------------------------------------------------------   Past Medical History:   Diagnosis Date    Bilateral pseudophakia 12/29/2020    Diabetes mellitus     Epiretinal membrane (ERM) of left eye 03/03/2021    Hernia, inguinal, left 04/21/2024    Hypertension     Primary open-angle glaucoma 12/29/2020    Vitreous prolapse, left eye 03/03/2021     Past Surgical History:   Procedure Laterality Date    CARDIAC CATHETERIZATION N/A 5/4/2018    Procedure: Left Heart Cath;  Surgeon: Jose Dickerson MD;  Location: [facility] CATH INVASIVE LOCATION;  Service: Cardiovascular    CHOLECYSTECTOMY      MOUTH SURGERY       Family History   Problem Relation Age of Onset    No Known Problems Father     No Known Problems Mother      Social History     Socioeconomic History    Marital status:    Tobacco Use    Smoking status: Former    Smokeless tobacco: Never   Vaping Use    Vaping status: Never Used   Substance and Sexual Activity    Alcohol use: No    Drug use: No    Sexual activity: Yes     Partners: Female     ---------------------------------------------------------------------------------------------------------------------   Allergies:  Patient has no known allergies.  ---------------------------------------------------------------------------------------------------------------------   Medications below are reported home medications pulling from within the system; at this time, these medications  have not been reconciled unless otherwise specified and are in the verification process for further verification as current home medications.      Prior to Admission Medications       Prescriptions Last Dose Informant Patient Reported? Taking?    amitriptyline (ELAVIL) 10 MG tablet   No No    Take 1 to 2 tablets by mouth Every Night at bedtime    aspirin 81 MG EC tablet   Yes No    Take 1 tablet by mouth Every Morning.    atorvastatin (LIPITOR) 10 MG tablet   Yes No    Take 1 tablet by mouth Every Night.    azithromycin (ZITHROMAX) 500 MG tablet   No No    Take 1 tablet by mouth Daily.    Blood Glucose Monitoring Suppl (TRUE METRIX METER) w/Device kit   No No    Use to test blood sugar 3 times a day and as needed    brimonidine (ALPHAGAN P) 0.1 % solution ophthalmic solution   Yes No    Every 8 (Eight) Hours.    Cholecalciferol 50 MCG (2000 UT) tablet   No No    Take 1 tablet by mouth Daily.    DULoxetine (CYMBALTA) 30 MG capsule   Yes No    Take 1 capsule by mouth Daily.    fenofibrate micronized (LOFIBRA) 134 MG capsule   Yes No    gabapentin (NEURONTIN) 100 MG capsule   Yes No    glimepiride (AMARYL) 4 MG tablet   No No    Take 1 tablet by mouth 2 (Two) Times a Day.    glucose blood test strip   No No    Use to test blood sugar 3 times a day & as needed    ibuprofen (ADVIL,MOTRIN) 800 MG tablet   No No    Take 1 tablet by mouth Every 6 to 8 Hours As Needed with food.    Lantus SoloStar 100 UNIT/ML injection pen   Yes No    latanoprost (XALATAN) 0.005 % ophthalmic solution   No No    Apply 1 drop to left eye as directed by provider Every Night.    linagliptin (TRADJENTA) 5 MG tablet tablet   No No    Take 1 tablet by mouth Daily.    loratadine (CLARITIN) 10 MG tablet   No No    Take 1 tablet by mouth Every Morning.    metFORMIN (GLUCOPHAGE) 850 MG tablet   No No    Take 1 tablet by mouth 2 (Two) Times a Day with food.    metFORMIN (GLUCOPHAGE) 850 MG tablet   Yes No    Take 500 mg by mouth 2 (Two) Times a Day With  Meals.    Multiple Vitamins-Minerals (MULTIVITAMIN ADULT PO)   Yes No    Take 1 tablet by mouth Every Morning.    sildenafil (REVATIO) 20 MG tablet   No No    1-5 by mouth one hour prior to intercourse on an empty stomach    Syringe, Disposable, 3 ML misc   No No    Use 3 ml syringe with a 25 gauge  5/8 inch needle    timolol (BETIMOL) 0.25 % ophthalmic solution   No No    Instill 1 drop into both eyes twice a day    timolol (TIMOPTIC) 0.5 % ophthalmic solution   No No    Instill 1 drop into both eyes twice a day    timolol (TIMOPTIC) 0.5 % ophthalmic solution   No No    Administer 1 drop to both eyes 3 (Three) Times a Day.    TRUEPLUS LANCETS 33G misc   No No    Use to test blood sugar 3 times a day & as needed    valsartan (DIOVAN) 40 MG tablet   Yes No    Take 1 tablet by mouth Daily.          Objective     Vital Signs:  Temp:  [97.7 °F (36.5 °C)-98.1 °F (36.7 °C)] 98.1 °F (36.7 °C)  Heart Rate:  [58-80] 67  Resp:  [16-18] 18  BP: ()/(54-93) 95/54    Mean Arterial Pressure (Non-Invasive) for the past 24 hrs (Last 3 readings):   Noninvasive MAP (mmHg)   04/22/24 0324 66   04/21/24 2132 110   04/21/24 2030 92     SpO2:  [94 %-98 %] 97 %  on   ;   Device (Oxygen Therapy): room air  Body mass index is 27.98 kg/m².    Wt Readings from Last 3 Encounters:   04/21/24 91 kg (200 lb 9.9 oz)   01/14/24 90.7 kg (200 lb)   12/06/23 95.3 kg (210 lb)      ----------------------------------------------------------------------------------------------------------------------  Physical Exam  Vitals and nursing note reviewed.   Constitutional:       General: He is awake. He is not in acute distress.     Appearance: He is well-developed. He is not ill-appearing, toxic-appearing or diaphoretic.      Comments: On room air.   HENT:      Head: Normocephalic and atraumatic.      Nose: Nose normal.   Eyes:      General: No scleral icterus.        Right eye: No discharge.         Left eye: No discharge.      Extraocular Movements:  Extraocular movements intact.      Conjunctiva/sclera: Conjunctivae normal.      Pupils: Pupils are equal, round, and reactive to light.   Cardiovascular:      Rate and Rhythm: Normal rate and regular rhythm.      Pulses: Normal pulses.      Heart sounds: No murmur heard.  Pulmonary:      Effort: Pulmonary effort is normal. No respiratory distress.      Breath sounds: No wheezing or rales.   Abdominal:      General: Bowel sounds are normal. There is no distension.      Palpations: Abdomen is soft.   Musculoskeletal:         General: No swelling, deformity or signs of injury.   Skin:     General: Skin is warm.      Capillary Refill: Capillary refill takes less than 2 seconds.      Coloration: Skin is not jaundiced or pale.      Findings: No bruising.   Neurological:      Mental Status: He is alert and oriented to person, place, and time. Mental status is at baseline.      Cranial Nerves: No cranial nerve deficit.   Psychiatric:         Mood and Affect: Mood normal.         Behavior: Behavior normal. Behavior is cooperative.         Thought Content: Thought content normal.         Judgment: Judgment normal.   ---------------------------------------------------------------------------------------------------------------------  EK:03 - NS with heart rate 75, QTc 408 ms, ST depression in the anterior and lateral leads and lead II.  Inverted T wave in V1.    18:13 - inverted T waves in V1-V2 and the ST depression was almost resolved.  When compared to EKGs dated 2022 and 2021, the ST depression and T wave inversions are new.  Please note that I have personally looked at the EKG from this admission, the comparison EKGs in the medical records, and the above is my interpretation of this admission's EKG; I read the final cardiology report.    ECG 12 Lead Chest Pain   Final Result   Test Reason : Chest Pain   Blood Pressure :   */*   mmHG   Vent. Rate :  65 BPM     Atrial Rate :  65 BPM      P-R Int : 164 ms           QRS Dur :  78 ms       QT Int : 398 ms       P-R-T Axes :  50 -16  36 degrees      QTc Int : 413 ms      Normal sinus rhythm with sinus arrhythmia   Nonspecific T wave abnormality   Abnormal ECG   When compared with ECG of 21-APR-2024 16:03, (Unconfirmed)   Inverted T waves have replaced nonspecific T wave abnormality in Anterior    leads   Confirmed by Jenna Caal MD (2023) on 4/21/2024 7:20:58 PM      Referred By: СЕРГЕЙ           Confirmed By: Jenna Caal MD      ECG 12 Lead ED Triage Standing Order; Chest Pain   Final Result   Test Reason : ED Triage Standing Order~   Blood Pressure :   */*   mmHG   Vent. Rate :  75 BPM     Atrial Rate :  75 BPM      P-R Int : 150 ms          QRS Dur :  76 ms       QT Int : 366 ms       P-R-T Axes :  56 -14  51 degrees      QTc Int : 408 ms      Normal sinus rhythm   Nonspecific ST abnormality   Abnormal ECG   When compared with ECG of 11-FEB-2022 05:27,   ST now depressed in Anterior leads   Confirmed by Jenna Caal MD (2023) on 4/21/2024 7:20:04 PM      Referred By: СЕРГЕЙ           Confirmed By: Jenna Caal MD        Telemetry:  NS with heart rates in the 60-70's.  Please note that I personally looked at the telemetry strips.  --------------------------------------------------------------------------------------------------------------------  LABS:    CBC and coagulation:  Results from last 7 days   Lab Units 04/22/24  0223 04/21/24  1623   WBC 10*3/mm3 6.83 6.34   HEMOGLOBIN g/dL 12.7* 13.9   HEMATOCRIT % 37.8 42.8   MCV fL 90.2 89.4   MCHC g/dL 33.6 32.5   PLATELETS 10*3/mm3 171 203   INR   --  0.86*     Renal and electrolytes:  Results from last 7 days   Lab Units 04/21/24  1623   SODIUM mmol/L 139   POTASSIUM mmol/L 4.8   MAGNESIUM mg/dL 1.8   CHLORIDE mmol/L 105   CO2 mmol/L 24.8   BUN mg/dL 21   CREATININE mg/dL 1.29*   CALCIUM mg/dL 9.7   GLUCOSE mg/dL 244*   ANION GAP mmol/L 9.2     Estimated Creatinine Clearance: 58.9 mL/min (A) (by C-G formula based on  SCr of 1.29 mg/dL (H)).    Liver and pancreatic function:  Results from last 7 days   Lab Units 04/21/24  1623   ALBUMIN g/dL 4.1   BILIRUBIN mg/dL 0.2   ALK PHOS U/L 59   AST (SGOT) U/L 25   ALT (SGPT) U/L 25   LIPASE U/L 47     Endocrine function:  Lab Results   Component Value Date    HGBA1C 8.40 (H) 05/04/2018     Point of care bedside glucose levels:  Results from last 7 days   Lab Units 04/21/24  2318   GLUCOSE mg/dL 198*     Lab Results   Component Value Date    TSH 1.800 04/21/2024    FREET4 0.85 (L) 04/21/2024     Cardiac:  Results from last 7 days   Lab Units 04/21/24  1820 04/21/24  1623   HSTROP T ng/L 60* 54*   PROBNP pg/mL  --  429.1         I have personally looked at the labs and they are summarized above.  ----------------------------------------------------------------------------------------------------------------------  Detailed radiology reports for the last 24 hours:    Imaging Results (Last 24 Hours)       Procedure Component Value Units Date/Time    XR Chest 1 View [[id number]] Collected: 04/21/24 1738     Updated: 04/21/24 1740    Narrative:      INDICATION: Chest pain.     TECHNIQUE: Frontal radiograph of the chest.     COMPARISON: 1/14/2021.     FINDINGS:   Elevation of the left hemidiaphragm. Cardiomegaly. Chronic appearing  interstitial lung markings. Pulmonary vasculature appear within normal  limits. No infiltrate, pleural effusion or pneumothorax. No acute  osseous abnormality evident.       Impression:      No acute cardiopulmonary process.        This report was finalized on 4/21/2024 5:38 PM by Alex Pallas, DO.           I have personally looked at the radiology images and I have read the available final report.    Assessment & Plan       -Acute NSTEMI with new anterior and lateral leads changes that resolved after the patient received aspirin, present on admission  -History of insulin-dependent type 2 diabetes mellitus  -History of essential hypertension  -History of diabetic  retinopathy    Placed in observation on the telemetry floor.  We will start the patient on 81 mg aspirin tomorrow; he already received a large chewable aspirin in the emergency department.  I will continue with the heparin drip that was started in the ED.  I have also started the patient on 40 mg of Lipitor.  He will be n.p.o. after midnight except for sips with meds and ice chips.  Cardiology will be consulted to see if the patient needs a left heart catheterization versus stress test.  I will order an echocardiogram to evaluate his current cardiac function.  We will monitor the glucose levels at least 4 times a day.  I have added a hemoglobin A1c to blood already in lab.  For now, since patient is n.p.o., I have cut his home Lantus from 30 units to 10 units nightly.  Currently, patient's blood pressures are at goal and stable and we will continue to monitor these without any antihypertensive medication.  His heart rates are already in the 60s range; should his heart rate start increasing, then I will start him on a low-dose metoprolol.  We will perform serial troponins and EKGs.  We will obtain a lipid panel.    VTE Prophylaxis:   Mechanical Order History:       None          Pharmalogical Order History:        Ordered     Dose Route Frequency Stop    04/21/24 1852  heparin (porcine) 5000 UNIT/ML injection 4,000 Units         4,000 Units IV Once --    04/21/24 1852  heparin 73643 units/250 mL (100 units/mL) in 0.45 % NaCl infusion  10 mL/hr         1,000 Units/hr IV Titrated --    04/21/24 1852  heparin (porcine) 5000 UNIT/ML injection 4,000 Units         4,000 Units IV As Needed --    04/21/24 1852  heparin (porcine) 5000 UNIT/ML injection 2,000 Units         2,000 Units IV As Needed --    04/21/24 1852  Pharmacy to Dose Heparin         -- XX Continuous PRN --                  The patient is high risk due to the following diagnoses/reasons: Non-ST elevation MI.    Shalonda Farnsworth MD  [facility]  Hospitalist  04/22/24  03:43 EDT

## 2024-04-22 NOTE — PROGRESS NOTES
HEPARIN INFUSION  Hector Johnston Jr. is a  74 y.o. male receiving heparin infusion.     Therapy for (VTE/Cardiac):   NSTEMI  Patient Dosing Weight: 90.7kg  Initial Bolus (Y/N):   Y  Any Bolus (Y/N):   Y        Signs or Symptoms of Bleeding: N    Cardiac or Other (Not VTE)   Initial Bolus: 60 units/kg (Max 4,000 units)  Initial rate: 12 units/kg/hr (Max 1,000 units/hr)   Anti Xa Rebolus Infusion Hold time Change infusion Dose (Units/kg/hr) Next Anti Xa or aPTT Level Due   < 0.11 50 Units/kg  (4000 Units Max) None Increase by  3 Units/kg/hr 6 hours   0.11- 0.19 25 Units/kg  (2000 Units Max) None Increase by  2 Units/kg/hr 6 hours   0.2 - 0.29 0 None Increase by  1 Units/kg/hr 6 hours   0.3 - 0.5 0 None No Change 6 hours (after 2 consecutive levels in range check q24h @0700)   0.51 - 0.6 0 None Decrease by  1 Units/kg/hr 6 hours   0.61 - 0.8 0 30 Minutes Decrease by  2 Units/kg/hr 6 hours   0.81 - 1 0 60 Minutes Decrease by  3 Units/kg/hr 6 hours   >1 0 Hold  After Anti Xa less than 0.5 decrease previous rate by  4 Units/kg/hr  Every 2 hours until Anti Xa  less than 0.5 then when infusion restarts in 6 hours         Recommend anti-Xa every 6 hours.          Date   Time   Anti-Xa Current Rate (Unit/kg/hr) Bolus   (Units) Rate Change   (Unit/kg/hr) New Rate (Unit/kg/hr) Next   Anti-Xa Comments  Pump Check Daily   4/21 1913 <0.10 - 4000 11 11 0230 Patient not an any DOACs: Initial bolus of 4,000 units, then 11unit/kg/hr infusion, no s/s of bleeding d/w Meredith BRUNNER   4/22 0223 0.44 11 - - 11 0830 Discussed with Sara Brunner. No s/s of bleeding   4/22 0838 0.34 11 - - 11 4/23 at 0700 Therapeutic x 2. Discussed with nurse Reece. No s/sx bleeding.                                                                                                                                                                                                            Pharmacy will continue to follow anti-Xa results and monitor for signs and  symptoms of bleeding or thrombosis.    Thank you,    Kayleigh Durham, PharmD  4/22/2024  09:49 EDT

## 2024-04-22 NOTE — PLAN OF CARE
Goal Outcome Evaluation:         Patient has been resting in bed this shift. No s/s of acute distress noted at this time. Plan of care ongoing.

## 2024-04-22 NOTE — ED PROVIDER NOTES
Subjective   History of Present Illness  74-year-old male who presents to the ED today for chest pain.  He reports that he has been having intermittent chest pain for the last few weeks.  It seems to have progressively gotten worse.  He states he had a significant episode last night as well as today.  He describes it as a pressure and tightness in his left chest that radiates into his left shoulder.  He states he does get short of breath and nauseous.  He denies any diaphoresis.  He states the episodes last for about 30 minutes and then go away.  He denies any history of coronary artery disease.  He did have a normal heart cath in 2018.  He states currently his pain is minimal.    History provided by:  Patient  Chest Pain  Pain location:  L chest  Pain quality: pressure and tightness    Pain radiates to:  L shoulder  Pain severity:  Moderate  Onset quality:  Sudden  Duration: a few weeks.  Timing:  Intermittent  Progression:  Worsening  Chronicity:  New  Relieved by:  Nothing  Worsened by:  Nothing  Associated symptoms: nausea and shortness of breath    Associated symptoms: no abdominal pain, no altered mental status, no anorexia, no anxiety, no back pain, no cough, no diaphoresis, no dizziness, no dysphagia, no fatigue, no fever, no headache, no heartburn, no lower extremity edema, no near-syncope, no palpitations, no syncope, no vomiting and no weakness    Risk factors: diabetes mellitus, high cholesterol, hypertension and male sex        Review of Systems   Constitutional: Negative.  Negative for diaphoresis, fatigue and fever.   HENT: Negative.  Negative for trouble swallowing.    Eyes: Negative.    Respiratory:  Positive for shortness of breath. Negative for cough.    Cardiovascular:  Positive for chest pain. Negative for palpitations, syncope and near-syncope.   Gastrointestinal:  Positive for nausea. Negative for abdominal pain, anorexia, heartburn and vomiting.   Genitourinary: Negative.    Musculoskeletal:   Negative for back pain.   Skin: Negative.    Neurological:  Negative for dizziness, weakness and headaches.   Psychiatric/Behavioral: Negative.     All other systems reviewed and are negative.      Past Medical History:   Diagnosis Date    Bilateral pseudophakia 12/29/2020    Diabetes mellitus     Epiretinal membrane (ERM) of left eye 03/03/2021    Hernia, inguinal, left 04/21/2024    Hypertension     Primary open-angle glaucoma 12/29/2020    Vitreous prolapse, left eye 03/03/2021       No Known Allergies    Past Surgical History:   Procedure Laterality Date    CARDIAC CATHETERIZATION N/A 5/4/2018    Procedure: Left Heart Cath;  Surgeon: Jose Dickerson MD;  Location: [facility] CATH INVASIVE LOCATION;  Service: Cardiovascular    CHOLECYSTECTOMY      MOUTH SURGERY         Family History   Problem Relation Age of Onset    No Known Problems Father     No Known Problems Mother        Social History     Socioeconomic History    Marital status:    Tobacco Use    Smoking status: Former    Smokeless tobacco: Never   Vaping Use    Vaping status: Never Used   Substance and Sexual Activity    Alcohol use: No    Drug use: No    Sexual activity: Yes     Partners: Female           Objective   Physical Exam  Vitals and nursing note reviewed.   Constitutional:       General: He is not in acute distress.     Appearance: He is well-developed. He is not diaphoretic.   HENT:      Head: Normocephalic and atraumatic.   Eyes:      Extraocular Movements: Extraocular movements intact.      Pupils: Pupils are equal, round, and reactive to light.   Neck:      Vascular: No JVD.      Trachea: No tracheal deviation.   Cardiovascular:      Rate and Rhythm: Normal rate and regular rhythm.      Heart sounds: Normal heart sounds.   Pulmonary:      Effort: Pulmonary effort is normal.      Breath sounds: Normal breath sounds.   Chest:      Chest wall: No tenderness.   Abdominal:      General: Bowel sounds are normal.      Palpations: Abdomen is  soft.      Tenderness: There is no abdominal tenderness.   Musculoskeletal:         General: Normal range of motion.      Cervical back: Normal range of motion and neck supple.      Right lower leg: No edema.      Left lower leg: No edema.   Lymphadenopathy:      Cervical: No cervical adenopathy.   Skin:     General: Skin is warm and dry.      Capillary Refill: Capillary refill takes less than 2 seconds.   Neurological:      General: No focal deficit present.      Mental Status: He is alert and oriented to person, place, and time.   Psychiatric:         Mood and Affect: Mood normal.         Procedures  Results for orders placed or performed during the hospital encounter of 04/21/24   Comprehensive Metabolic Panel    Specimen: Blood   Result Value Ref Range    Glucose 244 (H) 65 - 99 mg/dL    BUN 21 8 - 23 mg/dL    Creatinine 1.29 (H) 0.76 - 1.27 mg/dL    Sodium 139 136 - 145 mmol/L    Potassium 4.8 3.5 - 5.2 mmol/L    Chloride 105 98 - 107 mmol/L    CO2 24.8 22.0 - 29.0 mmol/L    Calcium 9.7 8.6 - 10.5 mg/dL    Total Protein 7.3 6.0 - 8.5 g/dL    Albumin 4.1 3.5 - 5.2 g/dL    ALT (SGPT) 25 1 - 41 U/L    AST (SGOT) 25 1 - 40 U/L    Alkaline Phosphatase 59 39 - 117 U/L    Total Bilirubin 0.2 0.0 - 1.2 mg/dL    Globulin 3.2 gm/dL    A/G Ratio 1.3 g/dL    BUN/Creatinine Ratio 16.3 7.0 - 25.0    Anion Gap 9.2 5.0 - 15.0 mmol/L    eGFR 58.5 (L) >60.0 mL/min/1.73   High Sensitivity Troponin T    Specimen: Blood   Result Value Ref Range    HS Troponin T 54 (C) <22 ng/L   CBC Auto Differential    Specimen: Blood   Result Value Ref Range    WBC 6.34 3.40 - 10.80 10*3/mm3    RBC 4.79 4.14 - 5.80 10*6/mm3    Hemoglobin 13.9 13.0 - 17.7 g/dL    Hematocrit 42.8 37.5 - 51.0 %    MCV 89.4 79.0 - 97.0 fL    MCH 29.0 26.6 - 33.0 pg    MCHC 32.5 31.5 - 35.7 g/dL    RDW 12.2 (L) 12.3 - 15.4 %    RDW-SD 39.6 37.0 - 54.0 fl    MPV 9.6 6.0 - 12.0 fL    Platelets 203 140 - 450 10*3/mm3    Neutrophil % 50.3 42.7 - 76.0 %    Lymphocyte %  36.3 19.6 - 45.3 %    Monocyte % 9.6 5.0 - 12.0 %    Eosinophil % 2.8 0.3 - 6.2 %    Basophil % 0.5 0.0 - 1.5 %    Immature Grans % 0.5 0.0 - 0.5 %    Neutrophils, Absolute 3.19 1.70 - 7.00 10*3/mm3    Lymphocytes, Absolute 2.30 0.70 - 3.10 10*3/mm3    Monocytes, Absolute 0.61 0.10 - 0.90 10*3/mm3    Eosinophils, Absolute 0.18 0.00 - 0.40 10*3/mm3    Basophils, Absolute 0.03 0.00 - 0.20 10*3/mm3    Immature Grans, Absolute 0.03 0.00 - 0.05 10*3/mm3    nRBC 0.0 0.0 - 0.2 /100 WBC   High Sensitivity Troponin T 2Hr    Specimen: Arm, Left; Blood   Result Value Ref Range    HS Troponin T 60 (C) <22 ng/L    Troponin T Delta 6 (C) >=-4 - <+4 ng/L   Protime-INR    Specimen: Blood   Result Value Ref Range    Protime 12.2 12.1 - 14.7 Seconds    INR 0.86 (L) 0.90 - 1.10   aPTT    Specimen: Blood   Result Value Ref Range    PTT 23.5 (L) 26.5 - 34.5 seconds   Lipase    Specimen: Blood   Result Value Ref Range    Lipase 47 13 - 60 U/L   BNP    Specimen: Blood   Result Value Ref Range    proBNP 429.1 0.0 - 900.0 pg/mL   Magnesium    Specimen: Blood   Result Value Ref Range    Magnesium 1.8 1.6 - 2.4 mg/dL   TSH    Specimen: Blood   Result Value Ref Range    TSH 1.800 0.270 - 4.200 uIU/mL   T4, Free    Specimen: Blood   Result Value Ref Range    Free T4 0.85 (L) 0.93 - 1.70 ng/dL   Heparin Anti-Xa    Specimen: Arm, Left; Blood   Result Value Ref Range    Heparin Anti-Xa (UFH) <0.10 (L) 0.30 - 0.70 IU/ml   ECG 12 Lead ED Triage Standing Order; Chest Pain   Result Value Ref Range    QT Interval 366 ms    QTC Interval 408 ms   ECG 12 Lead Chest Pain   Result Value Ref Range    QT Interval 398 ms    QTC Interval 413 ms   Green Top (Gel)   Result Value Ref Range    Extra Tube Hold for add-ons.    Lavender Top   Result Value Ref Range    Extra Tube hold for add-on    Gold Top - SST   Result Value Ref Range    Extra Tube Hold for add-ons.    Light Blue Top   Result Value Ref Range    Extra Tube Hold for add-ons.              ED Course  ED  Course as of 04/21/24 2004   Sun Apr 21, 2024   1716 HS Troponin T(!!): 54 []   1748 XR Chest 1 View  FINDINGS:   Elevation of the left hemidiaphragm. Cardiomegaly. Chronic appearing  interstitial lung markings. Pulmonary vasculature appear within normal  limits. No infiltrate, pleural effusion or pneumothorax. No acute  osseous abnormality evident.     IMPRESSION:  No acute cardiopulmonary process.   []   1925 Discussed with Dr. Quigley - recommended heparin and Nitro paste - admission to hospitalist service []   1951 Dr. Farnsworth to admit []      ED Course User Index  [] Gin Rodríguez, PA                HEART Score: 8                              Medical Decision Making  74-year-old male who presents to the ED today for chest pain.  This has been going on intermittently for the last few weeks.  He has chest pressure and tightness that radiates into the left shoulder.  First EKG did show some ST depression in the anterior leads.  Second EKG seemed to be slightly improved.  First troponin was 54 and repeat troponin was 60.  The patient remained chest pain-free throughout the majority of his ED stay.  He was given full dose aspirin and was started on heparin and Nitropaste.  General cardiology was consulted who agreed with admission and plan.  I spoke with Dr. Farnsworth with our hospital service team and he will be admitted to telemetry.    Problems Addressed:  NSTEMI (non-ST elevated myocardial infarction): complicated acute illness or injury    Amount and/or Complexity of Data Reviewed  Labs: ordered. Decision-making details documented in ED Course.  Radiology: ordered. Decision-making details documented in ED Course.  ECG/medicine tests: ordered.    Risk  OTC drugs.  Prescription drug management.  Decision regarding hospitalization.        Final diagnoses:   NSTEMI (non-ST elevated myocardial infarction)       ED Disposition  ED Disposition       ED Disposition   Decision to Admit    Condition   --     Comment   Level of Care: Telemetry [5]   Diagnosis: NSTEMI (non-ST elevated myocardial infarction) [542871]                 No follow-up provider specified.       Medication List      No changes were made to your prescriptions during this visit.            Gin Rodríguez PA  04/21/24 2004

## 2024-04-22 NOTE — PROGRESS NOTES
Patient Identification:  Name:  Hector Johnston Jr.  Age:  74 y.o.  Sex:  male  :  1950  MRN:  [id number]  Visit Number:  [id number]  Primary Care Provider:  MondayAnastacia     Length of stay:  0    Subjective/Interval History/Consultants/Procedures     Chief Complaint:   Chief Complaint   Patient presents with    Chest Pain       Subjective/Interval History:    74 y.o. male who was admitted on 2024 with chest pain    PMH is significant for diabetes mellitus, HTN  For complete admission information, please see history and physical.     Consultations:  Cardiology    Procedures/Scans:  CXR  CTA coronaries- planned    Today, the patient was resting comfortably at the time of exam. He reported some mild chest pain overnight which was retrosternal and sharp without radiation, shortness of breath, nausea, or diaphoresis. He denied acute complaint or current chest pain. No constipation. No difficulty with urination.      Room location at the time of evaluation was [facility].    ----------------------------------------------------------------------------------------------------------------------  Current [facility] Meds:  aspirin, 81 mg, Oral, Daily  atorvastatin, 40 mg, Oral, Nightly  brimonidine, 1 drop, Left Eye, BID  dorzolamide, 1 drop, Left Eye, TID  DULoxetine, 60 mg, Oral, Daily  gabapentin, 100 mg, Oral, BID  insulin glargine, 10 Units, Subcutaneous, Daily  latanoprost, 1 drop, Left Eye, Nightly  multivitamin with minerals, 1 tablet, Oral, QAM  senna-docusate sodium, 2 tablet, Oral, BID  sodium chloride, 10 mL, Intravenous, Q12H  timolol, 1 drop, Both Eyes, BID      heparin, 11 Units/kg/hr (Dosing Weight), Last Rate: 11 Units/kg/hr (24 1020)  Pharmacy to Dose Heparin,       ----------------------------------------------------------------------------------------------------------------------      Objective     Vital Signs:  Temp:  [97.6 °F (36.4 °C)-98.1 °F (36.7 °C)] 97.6 °F (36.4 °C)  Heart  Rate:  [58-80] 61  Resp:  [16-18] 18  BP: ()/(54-93) 119/75      04/21/24  1605 04/21/24  2132 04/22/24  0500   Weight: 90.7 kg (200 lb) 91 kg (200 lb 9.9 oz) 91 kg (200 lb 9.9 oz) (New admit less than 24 hours.)     Body mass index is 27.98 kg/m².  No intake or output data in the 24 hours ending 04/22/24 1127  No intake/output data recorded.  NPO Diet NPO Type: Sips with Meds, Ice Chips  ----------------------------------------------------------------------------------------------------------------------    Physical Exam  Vitals and nursing note reviewed.   Constitutional:       General: He is not in acute distress.  HENT:      Head: Normocephalic and atraumatic.   Eyes:      Extraocular Movements: Extraocular movements intact.      Conjunctiva/sclera: Conjunctivae normal.   Cardiovascular:      Rate and Rhythm: Normal rate and regular rhythm.   Pulmonary:      Effort: Pulmonary effort is normal.      Breath sounds: Normal breath sounds.   Abdominal:      Palpations: Abdomen is soft.   Musculoskeletal:      Right lower leg: No edema.      Left lower leg: No edema.   Skin:     General: Skin is warm and dry.   Neurological:      Mental Status: He is alert. Mental status is at baseline.   Psychiatric:         Mood and Affect: Mood normal.         Behavior: Behavior normal.                ----------------------------------------------------------------------------------------------------------------------  Tele:      ----------------------------------------------------------------------------------------------------------------------  Results from last 7 days   Lab Units 04/22/24  0528 04/21/24  1820 04/21/24  1623   HSTROP T ng/L 129* 60* 54*   PROBNP pg/mL  --   --  429.1     Results from last 7 days   Lab Units 04/22/24  0223 04/21/24  1623   WBC 10*3/mm3 6.83 6.34   HEMOGLOBIN g/dL 12.7* 13.9   HEMATOCRIT % 37.8 42.8   MCV fL 90.2 89.4   MCHC g/dL 33.6 32.5   PLATELETS 10*3/mm3 171 203   INR   --  0.86*        "  Results from last 7 days   Lab Units 04/22/24  0528 04/21/24  1820 04/21/24  1623   SODIUM mmol/L 140  --  139   POTASSIUM mmol/L 4.1  --  4.8   MAGNESIUM mg/dL 1.7 1.9 1.8   CHLORIDE mmol/L 106  --  105   CO2 mmol/L 24.8  --  24.8   BUN mg/dL 17  --  21   CREATININE mg/dL 1.11  --  1.29*   CALCIUM mg/dL 8.9  --  9.7   GLUCOSE mg/dL 194*  --  244*   ALBUMIN g/dL  --   --  4.1   BILIRUBIN mg/dL  --   --  0.2   ALK PHOS U/L  --   --  59   AST (SGOT) U/L  --   --  25   ALT (SGPT) U/L  --   --  25   Estimated Creatinine Clearance: 68.4 mL/min (by C-G formula based on SCr of 1.11 mg/dL).  No results found for: \"AMMONIA\"  Results from last 7 days   Lab Units 04/22/24  0528   CHOLESTEROL mg/dL 120  119   TRIGLYCERIDES mg/dL 169*   HDL CHOL mg/dL 29*   LDL CHOL mg/dL 62     No results found for: \"BLOODCX\"  No results found for: \"URINECX\"  No results found for: \"WOUNDCX\"  No results found for: \"STOOLCX\"  ----------------------------------------------------------------------------------------------------------------------  Imaging Results (Last 24 Hours)       Procedure Component Value Units Date/Time    XR Chest 1 View [[id number]] Collected: 04/21/24 1738     Updated: 04/21/24 1740    Narrative:      INDICATION: Chest pain.     TECHNIQUE: Frontal radiograph of the chest.     COMPARISON: 1/14/2021.     FINDINGS:   Elevation of the left hemidiaphragm. Cardiomegaly. Chronic appearing  interstitial lung markings. Pulmonary vasculature appear within normal  limits. No infiltrate, pleural effusion or pneumothorax. No acute  osseous abnormality evident.       Impression:      No acute cardiopulmonary process.        This report was finalized on 4/21/2024 5:38 PM by Alex Pallas, DO.             ----------------------------------------------------------------------------------------------------------------------   I have reviewed the above laboratory values for 04/22/24    Assessment/Plan     Active Hospital Problems    Diagnosis  " POA    **NSTEMI (non-ST elevated myocardial infarction) [I21.4]  Yes         ASSESSMENT/PLAN:    Chest pain with mixed features  NSTEMI, T1 vs T2  HTN  Cardiology consulted and following. Recommendations appreciated.   Continue heparin drip, asa, and statin for now  TTE and CTA coronaries pending.   Lipid panel notable for HDL 29, triglycerides 169  Standing orders in place for recurrent chest pain  Monitor telemetry  Repeat CBC, CMP in the AM    Uncontrolled IDDM with hyperglycemia   A1c is 9.40  Metformin held   Lantus continued at 10 units nightly  May require SSI once diet resumed.   Monitor with accuchecks. Hypoglycemia protocol is in place.     Diabetic retinopathy  Glaucoma  Continue home regimen  -----------  -DVT prophylaxis: currently on heparin drip  -Disposition plans/anticipated needs: pending course and further work up         The patient is high risk due to the following diagnoses/reasons:  NSTEMI        Camilo De La Cruz PA-C  04/22/24  11:27 EDT

## 2024-04-22 NOTE — CASE MANAGEMENT/SOCIAL WORK
Discharge Planning Assessment   Paul     Patient Name: Hector Johnston Jr.  MRN: [id number]  Today's Date: 4/21/2024    Admit Date: 4/21/2024    Plan: Pt lives at home with his spouse Beba who is at bedside and plans to return home at discharge family will provide transportation. Pcp is Anastacia Naidu, he uses [facility] and has Medicare a+b and [employer] [place]. Pt is independent with adl's and does not use any dme, home health or home o2. Pt denies any needs at this time.   Discharge Needs Assessment       Row Name 04/21/24 2007       Living Environment    People in Home spouse    Current Living Arrangements home    Primary Care Provided by self    Family Caregiver if Needed spouse    Quality of Family Relationships helpful;involved;supportive    Able to Return to Prior Arrangements yes       Resource/Environmental Concerns    Resource/Environmental Concerns none       Transition Planning    Patient/Family Anticipates Transition to home with family    Patient/Family Anticipated Services at Transition none    Transportation Anticipated family or friend will provide;car, drives self       Discharge Needs Assessment    Readmission Within the Last 30 Days no previous admission in last 30 days    Equipment Currently Used at Home none    Concerns to be Addressed no discharge needs identified;denies needs/concerns at this time    Anticipated Changes Related to Illness none    Equipment Needed After Discharge none                   Discharge Plan       Row Name 04/21/24 2008       Plan    Plan Pt lives at home with his spouse Beba who is at bedside and plans to return home at discharge family will provide transportation. Pcp is Anastacia Naidu, he uses [facility] and has Medicare a+b and [employer] [place]. Pt is independent with adl's and does not use any dme, home health or home o2. Pt denies any needs at this time.    Patient/Family in Agreement with Plan yes                  Continued Care and Services -  Admitted Since 4/21/2024    No active coordination exists for this encounter.       Expected Discharge Date and Time       Expected Discharge Date Expected Discharge Time    Apr 23, 2024            Demographic Summary       Row Name 04/21/24 2007       General Information    Admission Type observation    Arrived From home    Referral Source emergency department    Reason for Consult discharge planning                  Olivia Reed RN

## 2024-04-23 LAB
ALBUMIN SERPL-MCNC: 3.8 G/DL (ref 3.5–5.2)
ALBUMIN/GLOB SERPL: 1.4 G/DL
ALP SERPL-CCNC: 53 U/L (ref 39–117)
ALT SERPL W P-5'-P-CCNC: 22 U/L (ref 1–41)
ANION GAP SERPL CALCULATED.3IONS-SCNC: 9.6 MMOL/L (ref 5–15)
AST SERPL-CCNC: 23 U/L (ref 1–40)
BASOPHILS # BLD AUTO: 0.01 10*3/MM3 (ref 0–0.2)
BASOPHILS NFR BLD AUTO: 0.2 % (ref 0–1.5)
BILIRUB SERPL-MCNC: 0.3 MG/DL (ref 0–1.2)
BUN SERPL-MCNC: 14 MG/DL (ref 8–23)
BUN/CREAT SERPL: 11.4 (ref 7–25)
CALCIUM SPEC-SCNC: 8.8 MG/DL (ref 8.6–10.5)
CATH EF ESTIMATED: 50 %
CHLORIDE SERPL-SCNC: 107 MMOL/L (ref 98–107)
CO2 SERPL-SCNC: 22.4 MMOL/L (ref 22–29)
CREAT SERPL-MCNC: 1.23 MG/DL (ref 0.76–1.27)
DEPRECATED RDW RBC AUTO: 39.6 FL (ref 37–54)
EGFRCR SERPLBLD CKD-EPI 2021: 62 ML/MIN/1.73
EOSINOPHIL # BLD AUTO: 0.22 10*3/MM3 (ref 0–0.4)
EOSINOPHIL NFR BLD AUTO: 3.9 % (ref 0.3–6.2)
ERYTHROCYTE [DISTWIDTH] IN BLOOD BY AUTOMATED COUNT: 12.2 % (ref 12.3–15.4)
GLOBULIN UR ELPH-MCNC: 2.7 GM/DL
GLUCOSE BLDC GLUCOMTR-MCNC: 154 MG/DL (ref 70–130)
GLUCOSE BLDC GLUCOMTR-MCNC: 168 MG/DL (ref 70–130)
GLUCOSE BLDC GLUCOMTR-MCNC: 211 MG/DL (ref 70–130)
GLUCOSE BLDC GLUCOMTR-MCNC: 219 MG/DL (ref 70–130)
GLUCOSE SERPL-MCNC: 175 MG/DL (ref 65–99)
HCT VFR BLD AUTO: 38.8 % (ref 37.5–51)
HGB BLD-MCNC: 13.1 G/DL (ref 13–17.7)
IMM GRANULOCYTES # BLD AUTO: 0.03 10*3/MM3 (ref 0–0.05)
IMM GRANULOCYTES NFR BLD AUTO: 0.5 % (ref 0–0.5)
LYMPHOCYTES # BLD AUTO: 2.03 10*3/MM3 (ref 0.7–3.1)
LYMPHOCYTES NFR BLD AUTO: 35.9 % (ref 19.6–45.3)
MCH RBC QN AUTO: 29.8 PG (ref 26.6–33)
MCHC RBC AUTO-ENTMCNC: 33.8 G/DL (ref 31.5–35.7)
MCV RBC AUTO: 88.4 FL (ref 79–97)
MONOCYTES # BLD AUTO: 0.5 10*3/MM3 (ref 0.1–0.9)
MONOCYTES NFR BLD AUTO: 8.8 % (ref 5–12)
NEUTROPHILS NFR BLD AUTO: 2.87 10*3/MM3 (ref 1.7–7)
NEUTROPHILS NFR BLD AUTO: 50.7 % (ref 42.7–76)
NRBC BLD AUTO-RTO: 0 /100 WBC (ref 0–0.2)
PLATELET # BLD AUTO: 177 10*3/MM3 (ref 140–450)
PMV BLD AUTO: 10.2 FL (ref 6–12)
POTASSIUM SERPL-SCNC: 4.2 MMOL/L (ref 3.5–5.2)
PROT SERPL-MCNC: 6.5 G/DL (ref 6–8.5)
QT INTERVAL: 416 MS
QT INTERVAL: 420 MS
QT INTERVAL: 426 MS
QTC INTERVAL: 422 MS
QTC INTERVAL: 439 MS
QTC INTERVAL: 443 MS
RBC # BLD AUTO: 4.39 10*6/MM3 (ref 4.14–5.8)
SODIUM SERPL-SCNC: 139 MMOL/L (ref 136–145)
UFH PPP CHRO-ACNC: 1.01 IU/ML (ref 0.3–0.7)
UFH PPP CHRO-ACNC: <0.1 IU/ML (ref 0.3–0.7)
WBC NRBC COR # BLD AUTO: 5.66 10*3/MM3 (ref 3.4–10.8)

## 2024-04-23 PROCEDURE — 25810000003 SODIUM CHLORIDE 0.9 % SOLUTION: Performed by: INTERNAL MEDICINE

## 2024-04-23 PROCEDURE — 85520 HEPARIN ASSAY: CPT

## 2024-04-23 PROCEDURE — C1725 CATH, TRANSLUMIN NON-LASER: HCPCS | Performed by: INTERNAL MEDICINE

## 2024-04-23 PROCEDURE — B2111ZZ FLUOROSCOPY OF MULTIPLE CORONARY ARTERIES USING LOW OSMOLAR CONTRAST: ICD-10-PCS | Performed by: INTERNAL MEDICINE

## 2024-04-23 PROCEDURE — C1874 STENT, COATED/COV W/DEL SYS: HCPCS | Performed by: INTERNAL MEDICINE

## 2024-04-23 PROCEDURE — C9600 PERC DRUG-EL COR STENT SING: HCPCS | Performed by: INTERNAL MEDICINE

## 2024-04-23 PROCEDURE — 027035Z DILATION OF CORONARY ARTERY, ONE ARTERY WITH TWO DRUG-ELUTING INTRALUMINAL DEVICES, PERCUTANEOUS APPROACH: ICD-10-PCS | Performed by: INTERNAL MEDICINE

## 2024-04-23 PROCEDURE — 99232 SBSQ HOSP IP/OBS MODERATE 35: CPT

## 2024-04-23 PROCEDURE — 25010000002 FENTANYL CITRATE (PF) 50 MCG/ML SOLUTION: Performed by: INTERNAL MEDICINE

## 2024-04-23 PROCEDURE — C1760 CLOSURE DEV, VASC: HCPCS | Performed by: INTERNAL MEDICINE

## 2024-04-23 PROCEDURE — 93005 ELECTROCARDIOGRAM TRACING: CPT | Performed by: INTERNAL MEDICINE

## 2024-04-23 PROCEDURE — 63710000001 INSULIN GLARGINE PER 5 UNITS: Performed by: INTERNAL MEDICINE

## 2024-04-23 PROCEDURE — 25010000002 HEPARIN (PORCINE) PER 1000 UNITS: Performed by: INTERNAL MEDICINE

## 2024-04-23 PROCEDURE — 25510000001 IOPAMIDOL PER 1 ML: Performed by: INTERNAL MEDICINE

## 2024-04-23 PROCEDURE — 4A023N7 MEASUREMENT OF CARDIAC SAMPLING AND PRESSURE, LEFT HEART, PERCUTANEOUS APPROACH: ICD-10-PCS | Performed by: INTERNAL MEDICINE

## 2024-04-23 PROCEDURE — 25010000002 MIDAZOLAM PER 1 MG: Performed by: INTERNAL MEDICINE

## 2024-04-23 PROCEDURE — 99232 SBSQ HOSP IP/OBS MODERATE 35: CPT | Performed by: INTERNAL MEDICINE

## 2024-04-23 PROCEDURE — C1887 CATHETER, GUIDING: HCPCS | Performed by: INTERNAL MEDICINE

## 2024-04-23 PROCEDURE — 85025 COMPLETE CBC W/AUTO DIFF WBC: CPT | Performed by: INTERNAL MEDICINE

## 2024-04-23 PROCEDURE — 93458 L HRT ARTERY/VENTRICLE ANGIO: CPT | Performed by: INTERNAL MEDICINE

## 2024-04-23 PROCEDURE — 94799 UNLISTED PULMONARY SVC/PX: CPT

## 2024-04-23 PROCEDURE — C1894 INTRO/SHEATH, NON-LASER: HCPCS | Performed by: INTERNAL MEDICINE

## 2024-04-23 PROCEDURE — C1769 GUIDE WIRE: HCPCS | Performed by: INTERNAL MEDICINE

## 2024-04-23 PROCEDURE — B2151ZZ FLUOROSCOPY OF LEFT HEART USING LOW OSMOLAR CONTRAST: ICD-10-PCS | Performed by: INTERNAL MEDICINE

## 2024-04-23 PROCEDURE — 92928 PRQ TCAT PLMT NTRAC ST 1 LES: CPT | Performed by: INTERNAL MEDICINE

## 2024-04-23 PROCEDURE — 80053 COMPREHEN METABOLIC PANEL: CPT | Performed by: INTERNAL MEDICINE

## 2024-04-23 PROCEDURE — 63710000001 INSULIN LISPRO (HUMAN) PER 5 UNITS

## 2024-04-23 PROCEDURE — 82948 REAGENT STRIP/BLOOD GLUCOSE: CPT

## 2024-04-23 DEVICE — XIENCE SKYPOINT™ EVEROLIMUS ELUTING CORONARY STENT SYSTEM 2.50 MM X 15 MM / RAPID-EXCHANGE
Type: IMPLANTABLE DEVICE | Site: HEART | Status: FUNCTIONAL
Brand: XIENCE SKYPOINT™

## 2024-04-23 DEVICE — XIENCE SKYPOINT™ EVEROLIMUS ELUTING CORONARY STENT SYSTEM 2.50 MM X 23 MM / RAPID-EXCHANGE
Type: IMPLANTABLE DEVICE | Site: HEART | Status: FUNCTIONAL
Brand: XIENCE SKYPOINT™

## 2024-04-23 RX ORDER — SODIUM CHLORIDE 9 MG/ML
100 INJECTION, SOLUTION INTRAVENOUS CONTINUOUS
Status: DISCONTINUED | OUTPATIENT
Start: 2024-04-24 | End: 2024-04-24 | Stop reason: HOSPADM

## 2024-04-23 RX ORDER — SODIUM CHLORIDE 9 MG/ML
1 INJECTION, SOLUTION INTRAVENOUS CONTINUOUS
Status: ACTIVE | OUTPATIENT
Start: 2024-04-23 | End: 2024-04-23

## 2024-04-23 RX ORDER — MIDAZOLAM HYDROCHLORIDE 1 MG/ML
INJECTION INTRAMUSCULAR; INTRAVENOUS
Status: DISCONTINUED | OUTPATIENT
Start: 2024-04-23 | End: 2024-04-23 | Stop reason: HOSPADM

## 2024-04-23 RX ORDER — ACETAMINOPHEN 325 MG/1
650 TABLET ORAL EVERY 4 HOURS PRN
Status: DISCONTINUED | OUTPATIENT
Start: 2024-04-23 | End: 2024-04-24 | Stop reason: HOSPADM

## 2024-04-23 RX ORDER — ASPIRIN 81 MG/1
81 TABLET ORAL DAILY
Status: DISCONTINUED | OUTPATIENT
Start: 2024-04-24 | End: 2024-04-24 | Stop reason: HOSPADM

## 2024-04-23 RX ORDER — INSULIN LISPRO 100 [IU]/ML
2-7 INJECTION, SOLUTION INTRAVENOUS; SUBCUTANEOUS
Status: DISCONTINUED | OUTPATIENT
Start: 2024-04-23 | End: 2024-04-24 | Stop reason: HOSPADM

## 2024-04-23 RX ORDER — SODIUM CHLORIDE 9 MG/ML
INJECTION, SOLUTION INTRAVENOUS
Status: DISCONTINUED | OUTPATIENT
Start: 2024-04-23 | End: 2024-04-23 | Stop reason: HOSPADM

## 2024-04-23 RX ORDER — FENTANYL CITRATE 50 UG/ML
INJECTION, SOLUTION INTRAMUSCULAR; INTRAVENOUS
Status: DISCONTINUED | OUTPATIENT
Start: 2024-04-23 | End: 2024-04-23 | Stop reason: HOSPADM

## 2024-04-23 RX ORDER — HEPARIN SODIUM 1000 [USP'U]/ML
INJECTION, SOLUTION INTRAVENOUS; SUBCUTANEOUS
Status: DISCONTINUED | OUTPATIENT
Start: 2024-04-23 | End: 2024-04-23 | Stop reason: HOSPADM

## 2024-04-23 RX ORDER — LIDOCAINE HYDROCHLORIDE 20 MG/ML
INJECTION, SOLUTION INFILTRATION; PERINEURAL
Status: DISCONTINUED | OUTPATIENT
Start: 2024-04-23 | End: 2024-04-23 | Stop reason: HOSPADM

## 2024-04-23 RX ADMIN — Medication 10 ML: at 08:45

## 2024-04-23 RX ADMIN — BRIMONIDINE TARTRATE 1 DROP: 2 SOLUTION/ DROPS OPHTHALMIC at 21:05

## 2024-04-23 RX ADMIN — TICAGRELOR 90 MG: 90 TABLET ORAL at 21:03

## 2024-04-23 RX ADMIN — INSULIN GLARGINE 10 UNITS: 100 INJECTION, SOLUTION SUBCUTANEOUS at 08:44

## 2024-04-23 RX ADMIN — INSULIN LISPRO 3 UNITS: 100 INJECTION, SOLUTION INTRAVENOUS; SUBCUTANEOUS at 17:45

## 2024-04-23 RX ADMIN — LATANOPROST 1 DROP: 50 SOLUTION OPHTHALMIC at 21:05

## 2024-04-23 RX ADMIN — DOCUSATE SODIUM 50 MG AND SENNOSIDES 8.6 MG 2 TABLET: 8.6; 5 TABLET, FILM COATED ORAL at 08:45

## 2024-04-23 RX ADMIN — INSULIN LISPRO 3 UNITS: 100 INJECTION, SOLUTION INTRAVENOUS; SUBCUTANEOUS at 21:04

## 2024-04-23 RX ADMIN — BRIMONIDINE TARTRATE 1 DROP: 2 SOLUTION/ DROPS OPHTHALMIC at 08:45

## 2024-04-23 RX ADMIN — TIMOLOL MALEATE 1 DROP: 5 SOLUTION OPHTHALMIC at 21:04

## 2024-04-23 RX ADMIN — ATORVASTATIN CALCIUM 40 MG: 40 TABLET, FILM COATED ORAL at 21:03

## 2024-04-23 RX ADMIN — GABAPENTIN 100 MG: 100 CAPSULE ORAL at 21:04

## 2024-04-23 RX ADMIN — TIMOLOL MALEATE 1 DROP: 5 SOLUTION OPHTHALMIC at 08:45

## 2024-04-23 RX ADMIN — SODIUM CHLORIDE 100 ML/HR: 9 INJECTION, SOLUTION INTRAVENOUS at 03:49

## 2024-04-23 RX ADMIN — DORZOLAMIDE HCL 1 DROP: 20 SOLUTION/ DROPS OPHTHALMIC at 17:45

## 2024-04-23 RX ADMIN — GABAPENTIN 100 MG: 100 CAPSULE ORAL at 08:44

## 2024-04-23 RX ADMIN — DULOXETINE HYDROCHLORIDE 60 MG: 60 CAPSULE, DELAYED RELEASE ORAL at 13:29

## 2024-04-23 RX ADMIN — ASPIRIN 81 MG: 81 TABLET, COATED ORAL at 08:45

## 2024-04-23 RX ADMIN — DORZOLAMIDE HCL 1 DROP: 20 SOLUTION/ DROPS OPHTHALMIC at 08:45

## 2024-04-23 RX ADMIN — SODIUM CHLORIDE 1 ML/KG/HR: 9 INJECTION, SOLUTION INTRAVENOUS at 12:53

## 2024-04-23 RX ADMIN — DORZOLAMIDE HCL 1 DROP: 20 SOLUTION/ DROPS OPHTHALMIC at 21:05

## 2024-04-23 NOTE — PROGRESS NOTES
[facility]  PROGRESS NOTE    Patient information:  Name: Hector Johnston Jr.  Age/Sex: 74 y.o. male  :  1950        PCP: Anastacia Naidu APRN  Attending: Shalonda Farnsworth MD  MRN:  [id number]  Visit Number:  [id number]    LOS: 1  CODE STATUS:    Code Status and Medical Interventions:   Ordered at: 24 0346     Level Of Support Discussed With:    Patient     Code Status (Patient has no pulse and is not breathing):    CPR (Attempt to Resuscitate)     Medical Interventions (Patient has pulse or is breathing):    Full Support       PROBLEM LIST:Principal Problem:    NSTEMI (non-ST elevated myocardial infarction)      Reason for Cardiology follow-up: NSTEMI    Subjective   ADMISSION INFORMATION:  Chief Complaint   Patient presents with    Chest Pain       HPI:  Hector Johnston Jr. is a 74-year-old male with a past medical history significant for hypertension, diabetes mellitus type 2, and diabetic retinopathy.  He reports over the past 2 to 3 weeks he has been having intermittent chest pressure in the epigastric region.  This usually occurs about 30 minutes after eating lunch.  He reports he is fairly physically active but has not noticed any chest pain with exertion.  Initial high-sensitivity troponin in the ED was 54 with subsequent troponin 60 and 129.  An echocardiogram has been ordered but pending.  Creatinine 1.1.  Potassium 4.1.  Aspirin and statin were ordered and patient was placed on a heparin drip.  Cardiology was consulted for non-STEMI.  Of note, patient did have a left heart catheterization in 2018 by Dr. Jose Dickerson at [facility] which revealed normal coronary arteries.  Upon examination today, the patient denies any active chest pains.  He is diabetic with history of hypertension.  He is a non-smoker.       Interval History:   Patient was in room 314A when he was seen and examined with Dr. Calles.  He is resting comfortably in  bed.  He denies complaint of chest pain, shortness of breath, palpitations, swelling or syncope.  Heparin drip infusion.  He remains NPO.  He had a coronary CT on 4/22/2023 with a calcium score of 349, high risk plaque in the proximal LAD with occlusion, mild calcified plaque in the left main and left circumflex without significant stenosis or occlusion.  Per nursing notes no new events overnight.  BP this a.m. 112/73, heart rate 64.  A.m. labs with creatinine at 1.23, potassium at 4.2 and hemoglobin at 13.1.  Family at bedside.       Vital Signs  Temp:  [97.7 °F (36.5 °C)-98.5 °F (36.9 °C)] 98.4 °F (36.9 °C)  Heart Rate:  [64-81] 64  Resp:  [18-20] 20  BP: (111-128)/(69-76) 112/73  Flow (L/min):  [2] 2  Device (Oxygen Therapy): nasal cannula with ETCO2  Vital Signs (last 72 hrs)         04/20 0700  04/21 0659 04/21 0700  04/22 0659 04/22 0700  04/23 0659 04/23 0700  04/23 1203   Most Recent      Temp (°F)   97.7 -  98.1    97.6 -  98.5      98.4     98.4 (36.9) 04/23 1041    Heart Rate   58 -  80    61 -  81      64     64 04/23 1041    Resp   16 -  18    18 -  20      20     20 04/23 1041    BP   95/54 -  154/93    111/69 -  128/75      112/73     112/73 04/23 1041    SpO2 (%)   94 -  98    94 -  98    95 -  98     95 04/23 1200    Flow (L/min)         2     2 04/23 1131          BMI:Body mass index is 28.38 kg/m².    WEIGHT:      04/21/24  2132 04/22/24  0500 04/23/24  0500   Weight: 91 kg (200 lb 9.9 oz) 91 kg (200 lb 9.9 oz) (New admit less than 24 hours.) 92.3 kg (203 lb 7.8 oz)       DIET:NPO Diet NPO Type: Sips with Meds    I&O:  Intake & Output (last 3 days)         04/20 0701 04/21 0700 04/21 0701 04/22 0700 04/22 0701 04/23 0700 04/23 0701 04/24 0700    P.O.   600 0    Total Intake(mL/kg)   600 (6.6) 0 (0)    Urine (mL/kg/hr)   250 (0.1)     Total Output   250     Net   +350 0            Urine Unmeasured Occurrence  3 x 5 x              Objective     I have seen and examined Mr. Johnston today  Physical  Exam:      General Appearance:    Alert, cooperative, in no acute distress.   Head:    Normocephalic, without obvious abnormality, atraumatic.   Eyes:                          Conjunctivae and sclerae normal, no icterus, no pallor, corneas clear.   Neck:   No adenopathy, supple, trachea midline, no thyromegaly, no carotid bruit, no JVD.   Lungs:     Clear to auscultation, respirations regular, even and             unlabored.    Heart:    Regular rhythm and normal rate, normal S1 and S2, no          murmur, no gallop, no rub, no click   Chest Wall:    No abnormalities observed.   Abdomen:     Normal bowel sounds, no masses, no organomegaly, soft nontender, nondistended, no guarding, no rebound tenderness.   Extremities:   Moves all extremities well, no edema, no cyanosis, no           redness.   Pulses:   Pulses palpable and equal bilaterally.   Skin:   No bleeding, bruising or rash.   Neurologic:   Alert and Oriented x 3, Speech Clear & comprehensive.       Results review   Results Review:   Results from last 7 days   Lab Units 04/22/24  0528 04/21/24  1820 04/21/24  1623   HSTROP T ng/L 129* 60* 54*     Lab Results   Component Value Date    PROBNP 429.1 04/21/2024    PROBNP 68.2 01/17/2021     Results from last 7 days   Lab Units 04/23/24  0630 04/22/24  0223 04/21/24  1623   WBC 10*3/mm3 5.66 6.83 6.34   HEMOGLOBIN g/dL 13.1 12.7* 13.9   PLATELETS 10*3/mm3 177 171 203     Results from last 7 days   Lab Units 04/23/24  0630 04/22/24  0528 04/21/24  1623   SODIUM mmol/L 139 140 139   POTASSIUM mmol/L 4.2 4.1 4.8   CHLORIDE mmol/L 107 106 105   CO2 mmol/L 22.4 24.8 24.8   BUN mg/dL 14 17 21   CREATININE mg/dL 1.23 1.11 1.29*   CALCIUM mg/dL 8.8 8.9 9.7   GLUCOSE mg/dL 175* 194* 244*   ALT (SGPT) U/L 22  --  25   AST (SGOT) U/L 23  --  25     Lab Results   Component Value Date    MG 1.7 04/22/2024    MG 1.9 04/21/2024    MG 1.8 04/21/2024     Estimated Creatinine Clearance: 62.1 mL/min (by C-G formula based on SCr of  1.23 mg/dL).    Lab Results   Component Value Date    HGBA1C 9.40 (H) 04/22/2024    HGBA1C 8.40 (H) 05/04/2018     Lab Results   Component Value Date    CHOL 119 04/22/2024    CHOL 120 04/22/2024    TRIG 169 (H) 04/22/2024    LDL 62 04/22/2024    HDL 29 (L) 04/22/2024        Lab Results   Component Value Date    INR 0.86 (L) 04/21/2024     Lab Results   Component Value Date    LABHEPA <0.10 (L) 04/23/2024    LABHEPA 1.01 (C) 04/23/2024    LABHEPA 0.34 04/22/2024    LABHEPA 0.44 04/22/2024       Lab Results   Component Value Date    TSH 1.800 04/21/2024    PSA 1.380 04/26/2017      Pain Management Panel          Latest Ref Rng & Units 10/15/2022   Pain Management Panel   Creatinine, Urine mg/dL  mg/dL 48.5  48.5      Microbiology Results (last 10 days)       ** No results found for the last 240 hours. **           Imaging Results (Last 24 Hours)       Procedure Component Value Units Date/Time    CT Angiogram Heart With 3D Image [[id number]] Collected: 04/22/24 1423     Updated: 04/22/24 9958    Narrative:         EXAMINATION: CT ANGIOGRAM HEART W 3D IMAGE-     CLINICAL INDICATION: Chest pain, nonspecific; I21.4-Non-ST elevation  (NSTEMI) myocardial infarction     COMPARISON: None     TECHNIQUE: Axial computed tomographic angiography images of the coronary  arteries after IV contrast administration.  Sublingual nitroglycerine  for coronary vasodilation and IV metoprolol to reduce heart rate were  administered as needed. Refer to radiology nursing notes for details of  medications administered prior to the exam. 3D reconstructions were  obtained as well as MIP reconstructions.     FINDINGS:     Coronary vasculature:  LEFT coronary artery dominance.  Left main: Mild calcified plaque without significant stenosis or  occlusion.  LAD: Moderate-advanced calcified and noncalcified plaque. Noncalcified  high risk plaque of the proximal LAD with complete occlusion. Supplies 2  diagonal branches.  LCx: Supplies PDA and L-PLB.  Appears to supply at least 2 obtuse  marginal branches. No significant plaque, stenosis, or occlusion.  AM1: No significant plaque. No occlusion.  OM1: Appears patent. No significant plaque.  OM2:  RCA: Somewhat diminutive. Minimal calcified plaque. No significant  stenosis or occlusion.  D1: No significant plaque. No occlusion.  D2: No significant plaque. No occlusion.  PDA: Somewhat diminutive. Appears patent without obvious plaque.  L-PLB: No significant plaque. No occlusion.     Calcium score:  Total calcium score: 349  Percentile for patient age/demographic: 50-75th  Total calcium volume: 281  LM: 23  LAD: 288  LCx: 38  RCA: 0  PDA: 0  Other: 0      Heart:  Mild cardiac enlargement is noted.  No pericardial effusion.     Valves:  Aortic valve: Trileaflet. No irregular thickening or calcifications  identified.  Mitral valve: No irregular thickening or calcifications.     Mediastinum:  Three-vessel arch configuration noted. Plaque is noted involving the  origins of the arch vessels but no significant stenosis or occlusion  identified.  Aortic root is 3.70 cm at level of sinus of Valsalva. No aneurysm.      Lymph nodes:  No enlarged mediastinal or hilar lymph nodes.     Lung fields:  Left lower lobe airspace disease which may represent atelectasis or  pneumonia. Bronchial wall thickening is noted.     Visualized pleural spaces:  Unremarkable as visualized.     Visualized bony structures:  Mild degenerative changes thoracic spine. No acute bony findings are  identified.       Visualized upper abdomen:  Mild diffuse fatty infiltration of liver. Upper abdomen otherwise  unremarkable as visualized.           Impression:      1. Noncalcified high risk plaque proximal LAD with occlusion. Distal  reconstitution of flow noted.  2. Left dominant coronary artery system.  3. Mild calcified plaque left main and left circumflex arteries but  without significant stenosis or occlusion.  4. Total calcium score: 349; corresponds  to 50-75th percentile for  patient age/demographic.  5. Left lower lobe airspace disease which may represent atelectasis or  pneumonia.  6. Other incidental/nonacute findings as above.     ASSESSMENT: CAD RADS 5/P3/HRP        This report was finalized on 4/22/2024 5:56 PM by Dr. Mark Chu MD.             ECHO:  Results for orders placed during the hospital encounter of 04/21/24    Adult Transthoracic Echo Complete W/ Cont if Necessary Per Protocol    Interpretation Summary    Normal left ventricular cavity size and wall thickness noted. All left ventricular wall segments contract normally.    Left ventricular ejection fraction appears to be 61 - 65%.    Left ventricular diastolic function is consistent with (grade I) impaired relaxation.    The aortic valve is abnormal in structure. The aortic valve exhibits sclerosis. The aortic valve appears trileaflet. Mild aortic valve regurgitation is present. No aortic valve stenosis is present.    The mitral valve is structurally normal with no regurgitation or significant stenosis present.    There is no evidence of pericardial effusion. .      STRESS TEST:       HEART CATH:  Results for orders placed during the hospital encounter of 05/04/18    Cardiac Catheterization/Vascular Study    Narrative  ---------------------------------------------------------------------------------------------------------------------    Referring Provider: Cecy Gorman M.D.    Procedures Performed: Left heart catheterization with left ventriculography and bilateral selective coronary angiography.  Closure the right common femoral artery with a 6 Armenian VIP Angio-Seal device.    Indications: The patient is a 68-year-old hypertensive dyslipidemic diabetic who underwent a coronary calcium score screening and this was abnormal.  A subsequent exercise test was remarkable for a duration of only 2 minutes before the patient had to stop because of extreme fatigue and 1 mm ST segment depression  "was noted (without chest pain) at the end of the test.  Because of the patient's risk factors and \"high risk\" EKG exercise stress test, catheterization with intervention standby was elected.    Procedure Narrative: Study was done on the right common femoral artery with 6 [place]ian catheter system and Maria E angiography.  Following the completion of this procedure and the review of an acceptable vascular access site angiogram, the right common femoral artery was closed with 6 [place]ian VIP Angio-Seal device.  There were no complications.  Details are noted elsewhere.    Complications:  There were no signs of early complications.    Hemodynamic Findings:  · AO pressure: 100/60 mmHg  · LVpressure: 100/5 mmHg    Angiographic Findings:  Left Ventriculography:  The left ventricle was filmed in a single plane, 30' GOMEZ projection.  Subjectively, this chamber is normal in size.  I cannot identify any global or segmental wall motion abnormalities.  There is no mitral regurgitation.  There is no mitral valve prolapse.  The estimated left ventricular ejection fraction is 65%.  This is a normal left ventriculogram.      Selective Coronary Angiography:  · LM:  Normal.  · LAD: This vessel gives rise to several very small diagonal arteries along its course.  The LAD and its branches are angiographically normal.  · LCX: This is a large dominant vessel gives rise to several marginal arteries over the posterior left ventricular wall before terminating in the posterior interventricular groove is a left PDA.  This vessel is angiographically normal.  · RCA: This is an angiographically normal small nondominant vessel.    Final Impression: Normal left ventricular function and angiographically normal coronary arteries.      EKG:        TELEMETRY:            I reviewed the patient's new clinical results.    ALLERGIES: Patient has no known allergies.    Medication Review:   Current list of medications may not reflect those currently placed in " orders that are not signed or are being held.     [Transfer Hold] aspirin, 81 mg, Oral, Daily  [Transfer Hold] atorvastatin, 40 mg, Oral, Nightly  [Transfer Hold] brimonidine, 1 drop, Left Eye, BID  [Transfer Hold] dorzolamide, 1 drop, Left Eye, TID  [Transfer Hold] DULoxetine, 60 mg, Oral, Daily  gabapentin, 100 mg, Oral, BID  [Transfer Hold] insulin glargine, 10 Units, Subcutaneous, Daily  [Transfer Hold] latanoprost, 1 drop, Left Eye, Nightly  [Transfer Hold] multivitamin with minerals, 1 tablet, Oral, QAM  [Transfer Hold] senna-docusate sodium, 2 tablet, Oral, BID  [Transfer Hold] sodium chloride, 10 mL, Intravenous, Q12H  [Transfer Hold] timolol, 1 drop, Both Eyes, BID      heparin, 7 Units/kg/hr (Dosing Weight), Last Rate: Stopped (04/23/24 0726)  Pharmacy to Dose Heparin,   sodium chloride, 100 mL/hr, Last Rate: 100 mL/hr (04/23/24 0349)  sodium chloride, , Last Rate: 75 mL/hr (04/23/24 1132)        [Transfer Hold] acetaminophen    [Transfer Hold] senna-docusate sodium **AND** [Transfer Hold] polyethylene glycol **AND** [Transfer Hold] bisacodyl **AND** [Transfer Hold] bisacodyl    [Transfer Hold] dextrose    [Transfer Hold] dextrose    fentaNYL citrate (PF)    [Transfer Hold] glucagon (human recombinant)    heparin (porcine)    lidocaine    midazolam    [Transfer Hold] nitroglycerin    O2    Pharmacy to Dose Heparin    [Transfer Hold] sodium chloride    [Transfer Hold] sodium chloride    [Transfer Hold] sodium chloride    sodium chloride    Assessment      Acute non-STEMI (most probably type I)  Significant LAD disease noted on the CT coronary angiogram.  Continued CCS class III anginal symptoms  Type 2 diabetes mellitus  Systemic hypertension  Dyslipidemia.    Plan     Due to continued anginal symptoms and progressively worsening high sensitive troponin, and with possible significant LAD disease noted on the CT coronary angiogram, I have discussed with him about the option of further cardiac evaluation with  cardiac catheterization.  I have discussed the procedure, potential risks, benefits and alternatives.  He expressed understanding of same and is wanting to proceed.  Will schedule this for today.  I have discussed with Dr. Saleh who is agreed to do this today.  Continue with aspirin, atorvastatin and add a beta-blocker as tolerated.  Add a thienopyridine if needed, especially if patient undergoes coronary intervention  Continue Dubbs is a risk factor modification is needed.    I have discussed the patients findings and recommendations with patient.    Thank you very much for asking us to be involved in this patient's care.  We will follow along with you.    Electronically signed by EDMOND Maloney, 04/23/24, 12:03 PM EDT.    Electronically signed by Gilbert Calles MD, 04/23/24, 2:03 PM EDT.          Please note that portions of this note were completed with a voice recognition program.    Please note that portions of this note were copied and has been reviewed and is accurate as of 4/23/2024 .

## 2024-04-23 NOTE — PLAN OF CARE
Goal Outcome Evaluation:      Patient has been resting in bed. Patient had a heart cath this shift through the right femoral site. No s/s of acute distress or bleeding noted at this time. Plan of care ongoing.

## 2024-04-23 NOTE — NURSING NOTE
Transitional Care Note    Enrolled in [facility] Transitional Care Note    Enrolled in [facility] Transitional Care Services under theTCM Model to be followed for 6 weeks post discharge.  [facility] will assist with support and education at time of transition home from hospital.  Hospital  will follow throughout the stay at [facility].  Home  will visit within 48 hours of discharge and follow with home vitals and telephone contact for 6 weeks.      Patient admitted to [facility] via Emergency Department. Admitted for further treatment of NSTEMI.    Explained transition to home program and Patient is agreeable to home visits.    Home  will be More Yanes LPN

## 2024-04-23 NOTE — PROGRESS NOTES
Patient Identification:  Name:  Hector Johnston Jr.  Age:  74 y.o.  Sex:  male  :  1950  MRN:  [id number]  Visit Number:  [id number]  Primary Care Provider:  MondayAnastacia     Length of stay:  1    Subjective/Interval History/Consultants/Procedures     Chief Complaint:   Chief Complaint   Patient presents with    Chest Pain       Subjective/Interval History:    74 y.o. male who was admitted on 2024 with chest pain     PMH is significant for diabetes mellitus, HTN   For complete admission information, please see history and physical.     Consultations:  Cardiology   Interventional cardiology    Procedures/Scans:  CXR  CTA coronaries- planned  Cardiac catheterization 24    Today, the patient was seen and examined following [facility] with family member at bedside. He reports he tolerated procedure well with no further chest pain, shortness of breath, or palpitations. No N/V or abdominal pain.      Room location at the time of evaluation was [facility].    ----------------------------------------------------------------------------------------------------------------------  [facility] [place] Meds:  [START ON 2024] aspirin, 81 mg, Oral, Daily  atorvastatin, 40 mg, Oral, Nightly  brimonidine, 1 drop, Left Eye, BID  dorzolamide, 1 drop, Left Eye, TID  DULoxetine, 60 mg, Oral, Daily  gabapentin, 100 mg, Oral, BID  insulin glargine, 10 Units, Subcutaneous, Daily  latanoprost, 1 drop, Left Eye, Nightly  multivitamin with minerals, 1 tablet, Oral, QAM  senna-docusate sodium, 2 tablet, Oral, BID  sodium chloride, 10 mL, Intravenous, Q12H  ticagrelor, 90 mg, Oral, BID  timolol, 1 drop, Both Eyes, BID      [START ON 2024] sodium chloride, 100 mL/hr  sodium chloride, 1 mL/kg/hr, Last Rate: 1 mL/kg/hr (24 6304)      ----------------------------------------------------------------------------------------------------------------------      Objective     Vital Signs:  Temp:  [97.7 °F (36.5 °C)-98.5 °F  (36.9 °C)] 98.4 °F (36.9 °C)  Heart Rate:  [61-81] 65  Resp:  [18-20] 18  BP: (111-147)/(69-79) 147/77      04/21/24  2132 04/22/24  0500 04/23/24  0500   Weight: 91 kg (200 lb 9.9 oz) 91 kg (200 lb 9.9 oz) (New admit less than 24 hours.) 92.3 kg (203 lb 7.8 oz)     Body mass index is 28.38 kg/m².    Intake/Output Summary (Last 24 hours) at 4/23/2024 1333  Last data filed at 4/23/2024 1221  Gross per 24 hour   Intake 660.35 ml   Output 250 ml   Net 410.35 ml     I/O this shift:  In: 60.4 [I.V.:60.4]  Out: -   Diet: Cardiac, Diabetic; Healthy Heart (2-3 Na+); Consistent Carbohydrate; Fluid Consistency: Thin (IDDSI 0)  ----------------------------------------------------------------------------------------------------------------------    Physical Exam  Vitals and nursing note reviewed.   Constitutional:       General: He is not in acute distress.  HENT:      Head: Normocephalic and atraumatic.   Eyes:      Extraocular Movements: Extraocular movements intact.      Conjunctiva/sclera: Conjunctivae normal.   Cardiovascular:      Rate and Rhythm: Normal rate and regular rhythm.   Pulmonary:      Effort: Pulmonary effort is normal.      Breath sounds: Normal breath sounds.   Abdominal:      Palpations: Abdomen is soft.      Tenderness: There is no abdominal tenderness.   Musculoskeletal:      Right lower leg: No edema.      Left lower leg: No edema.   Skin:     General: Skin is warm and dry.   Neurological:      Mental Status: He is alert. Mental status is at baseline.   Psychiatric:         Mood and Affect: Mood normal.         Behavior: Behavior normal.                ----------------------------------------------------------------------------------------------------------------------  Tele:      ----------------------------------------------------------------------------------------------------------------------  Results from last 7 days   Lab Units 04/22/24  0528 04/21/24  1820 04/21/24  1623   HSTROP T ng/L 129* 60*  "54*   PROBNP pg/mL  --   --  429.1     Results from last 7 days   Lab Units 04/23/24  0630 04/22/24  0223 04/21/24  1623   WBC 10*3/mm3 5.66 6.83 6.34   HEMOGLOBIN g/dL 13.1 12.7* 13.9   HEMATOCRIT % 38.8 37.8 42.8   MCV fL 88.4 90.2 89.4   MCHC g/dL 33.8 33.6 32.5   PLATELETS 10*3/mm3 177 171 203   INR   --   --  0.86*         Results from last 7 days   Lab Units 04/23/24  0630 04/22/24  0528 04/21/24  1820 04/21/24  1623   SODIUM mmol/L 139 140  --  139   POTASSIUM mmol/L 4.2 4.1  --  4.8   MAGNESIUM mg/dL  --  1.7 1.9 1.8   CHLORIDE mmol/L 107 106  --  105   CO2 mmol/L 22.4 24.8  --  24.8   BUN mg/dL 14 17  --  21   CREATININE mg/dL 1.23 1.11  --  1.29*   CALCIUM mg/dL 8.8 8.9  --  9.7   GLUCOSE mg/dL 175* 194*  --  244*   ALBUMIN g/dL 3.8  --   --  4.1   BILIRUBIN mg/dL 0.3  --   --  0.2   ALK PHOS U/L 53  --   --  59   AST (SGOT) U/L 23  --   --  25   ALT (SGPT) U/L 22  --   --  25   Estimated Creatinine Clearance: 62.1 mL/min (by C-G formula based on SCr of 1.23 mg/dL).  No results found for: \"AMMONIA\"  Results from last 7 days   Lab Units 04/22/24  0528   CHOLESTEROL mg/dL 120  119   TRIGLYCERIDES mg/dL 169*   HDL CHOL mg/dL 29*   LDL CHOL mg/dL 62     No results found for: \"BLOODCX\"  No results found for: \"URINECX\"  No results found for: \"WOUNDCX\"  No results found for: \"STOOLCX\"  ----------------------------------------------------------------------------------------------------------------------  Imaging Results (Last 24 Hours)       Procedure Component Value Units Date/Time    CT Angiogram Heart With 3D Image [[id number]] Collected: 04/22/24 1423     Updated: 04/22/24 2273    Narrative:         EXAMINATION: CT ANGIOGRAM HEART W 3D IMAGE-     CLINICAL INDICATION: Chest pain, nonspecific; I21.4-Non-ST elevation  (NSTEMI) myocardial infarction     COMPARISON: None     TECHNIQUE: Axial computed tomographic angiography images of the coronary  arteries after IV contrast administration.  Sublingual " nitroglycerine  for coronary vasodilation and IV metoprolol to reduce heart rate were  administered as needed. Refer to radiology nursing notes for details of  medications administered prior to the exam. 3D reconstructions were  obtained as well as MIP reconstructions.     FINDINGS:     Coronary vasculature:  LEFT coronary artery dominance.  Left main: Mild calcified plaque without significant stenosis or  occlusion.  LAD: Moderate-advanced calcified and noncalcified plaque. Noncalcified  high risk plaque of the proximal LAD with complete occlusion. Supplies 2  diagonal branches.  LCx: Supplies PDA and L-PLB. Appears to supply at least 2 obtuse  marginal branches. No significant plaque, stenosis, or occlusion.  AM1: No significant plaque. No occlusion.  OM1: Appears patent. No significant plaque.  OM2:  RCA: Somewhat diminutive. Minimal calcified plaque. No significant  stenosis or occlusion.  D1: No significant plaque. No occlusion.  D2: No significant plaque. No occlusion.  PDA: Somewhat diminutive. Appears patent without obvious plaque.  L-PLB: No significant plaque. No occlusion.     Calcium score:  Total calcium score: 349  Percentile for patient age/demographic: 50-75th  Total calcium volume: 281  LM: 23  LAD: 288  LCx: 38  RCA: 0  PDA: 0  Other: 0      Heart:  Mild cardiac enlargement is noted.  No pericardial effusion.     Valves:  Aortic valve: Trileaflet. No irregular thickening or calcifications  identified.  Mitral valve: No irregular thickening or calcifications.     Mediastinum:  Three-vessel arch configuration noted. Plaque is noted involving the  origins of the arch vessels but no significant stenosis or occlusion  identified.  Aortic root is 3.70 cm at level of sinus of Valsalva. No aneurysm.      Lymph nodes:  No enlarged mediastinal or hilar lymph nodes.     Lung fields:  Left lower lobe airspace disease which may represent atelectasis or  pneumonia. Bronchial wall thickening is noted.      Visualized pleural spaces:  Unremarkable as visualized.     Visualized bony structures:  Mild degenerative changes thoracic spine. No acute bony findings are  identified.       Visualized upper abdomen:  Mild diffuse fatty infiltration of liver. Upper abdomen otherwise  unremarkable as visualized.           Impression:      1. Noncalcified high risk plaque proximal LAD with occlusion. Distal  reconstitution of flow noted.  2. Left dominant coronary artery system.  3. Mild calcified plaque left main and left circumflex arteries but  without significant stenosis or occlusion.  4. Total calcium score: 349; corresponds to 50-75th percentile for  patient age/demographic.  5. Left lower lobe airspace disease which may represent atelectasis or  pneumonia.  6. Other incidental/nonacute findings as above.     ASSESSMENT: CAD RADS 5/P3/HRP        This report was finalized on 4/22/2024 5:56 PM by Dr. Mark Chu MD.             ----------------------------------------------------------------------------------------------------------------------   I have reviewed the above laboratory values for 04/23/24    Assessment/Plan     Active Hospital Problems    Diagnosis  POA    **NSTEMI (non-ST elevated myocardial infarction) [I21.4]  Yes         ASSESSMENT/PLAN:    Chest pain with mixed features  NSTEMI, T1   CAD, s/p stent x2 to LAD  HTN  Cardiology consulted and following. Recommendations appreciated.   TTE showed LVEF 61-65%, grade 1 diastolic dysfunction. Aortic sclerosis.  CTA coronaries- high risk plaque LAD with occlusion, mild calcification of left main and left circumflex. Calcium score 349.   Lipid panel notable for HDL 29, triglycerides 169  Interentional cardiology consulted and patient underwent catheterization 4/23/24 and had stenting x 2 to LAD.   Aspiring, statin continued. Brilinta added. Heparin infusion discontinued.  Standing orders in place for recurrent chest pain  Follow up further cardiology  recommendations.   Monitor telemetry  Repeat CBC, CMP in the AM     Uncontrolled IDDM with hyperglycemia   A1c is 9.40  Metformin held   Lantus continued at 10 units nightly  Will add low dose SSI now that diet is resumed.   Monitor with accuchecks. Hypoglycemia protocol is in place.      Diabetic retinopathy  Glaucoma  Continue home regimen    -----------  -Disposition plans/anticipated needs:Pending course, potentially home 24-48 hours if clinically stable pending cardiology recommendations        The patient is high risk due to the following diagnoses/reasons:  NSTEMI T1        Camilo De La Cruz PA-C  04/23/24  13:33 EDT

## 2024-04-23 NOTE — PLAN OF CARE
Goal Outcome Evaluation:   .Pt resting in bed throughout shift. No requests/complaints. Heparin gtt infusing. VSS. Plan of care ongoing

## 2024-04-23 NOTE — PROGRESS NOTES
HEPARIN INFUSION  Hector Johnston Jr. is a  74 y.o. male receiving heparin infusion.     Therapy for (VTE/Cardiac):   NSTEMI  Patient Dosing Weight: 90.7kg  Initial Bolus (Y/N):   Y  Any Bolus (Y/N):   Y        Signs or Symptoms of Bleeding: N    Cardiac or Other (Not VTE)   Initial Bolus: 60 units/kg (Max 4,000 units)  Initial rate: 12 units/kg/hr (Max 1,000 units/hr)   Anti Xa Rebolus Infusion Hold time Change infusion Dose (Units/kg/hr) Next Anti Xa or aPTT Level Due   < 0.11 50 Units/kg  (4000 Units Max) None Increase by  3 Units/kg/hr 6 hours   0.11- 0.19 25 Units/kg  (2000 Units Max) None Increase by  2 Units/kg/hr 6 hours   0.2 - 0.29 0 None Increase by  1 Units/kg/hr 6 hours   0.3 - 0.5 0 None No Change 6 hours (after 2 consecutive levels in range check q24h @0700)   0.51 - 0.6 0 None Decrease by  1 Units/kg/hr 6 hours   0.61 - 0.8 0 30 Minutes Decrease by  2 Units/kg/hr 6 hours   0.81 - 1 0 60 Minutes Decrease by  3 Units/kg/hr 6 hours   >1 0 Hold  After Anti Xa less than 0.5 decrease previous rate by  4 Units/kg/hr  Every 2 hours until Anti Xa  less than 0.5 then when infusion restarts in 6 hours         Recommend anti-Xa every 6 hours.          Date   Time   Anti-Xa Current Rate (Unit/kg/hr) Bolus   (Units) Rate Change   (Unit/kg/hr) New Rate (Unit/kg/hr) Next   Anti-Xa Comments  Pump Check Daily   4/21 1913 <0.10 - 4000 11 11 0230 Patient not an any DOACs: Initial bolus of 4,000 units, then 11unit/kg/hr infusion, no s/s of bleeding d/w Meredith MARINA   4/22 0223 0.44 11 - - 11 0830 Discussed with Sara Marina. No s/s of bleeding   4/22 0838 0.34 11 - - 11 4/23 at 0700 Therapeutic x 2. Discussed with nurse Reece. No s/sx bleeding.    4/23 0711 1.01 11 - hold hold 4/23 at 0930 Anti Xa level is elevated at 1.01. Discussed with Hilda Edmonds RN that therapy needed to be placed on hold and repeat level will be drawn at 0930. No s/sx bleeding.    4/24 918 <0.10 hold - -4 7 9130 Discussed with Hilda MARINA. No  s/s of bleeding                                                                                                                                                                                     Pharmacy will continue to follow anti-Xa results and monitor for signs and symptoms of bleeding or thrombosis.    Thank you,    Margaret Mccullough PharmD

## 2024-04-23 NOTE — CASE MANAGEMENT/SOCIAL WORK
Continued Stay Note  SONIA Miller     Patient Name: Hector Johnston Jr.  MRN: [id number]  Today's Date: 4/23/2024    Admit Date: 4/21/2024    Plan: Patient's discharge plan remains the same as the previous plan. Patient plans to return home with his spouse, Beba, at discharge with Beba providing transporation when medically stable.   Discharge Plan       Row Name 04/23/24 1242       Plan    Plan Patient's discharge plan remains the same as the previous plan. Patient plans to return home with his spouse, Beba, at discharge with Beba providing transporation when medically stable.           Mariely Pierce RN

## 2024-04-24 ENCOUNTER — READMISSION MANAGEMENT (OUTPATIENT)
Dept: CALL CENTER | Facility: HOSPITAL | Age: 74
End: 2024-04-24
Payer: MEDICARE

## 2024-04-24 ENCOUNTER — TELEPHONE (OUTPATIENT)
Dept: CARDIOLOGY | Facility: CLINIC | Age: 74
End: 2024-04-24

## 2024-04-24 VITALS
BODY MASS INDEX: 27.04 KG/M2 | RESPIRATION RATE: 20 BRPM | SYSTOLIC BLOOD PRESSURE: 141 MMHG | HEART RATE: 70 BPM | WEIGHT: 193.12 LBS | TEMPERATURE: 98.7 F | DIASTOLIC BLOOD PRESSURE: 85 MMHG | OXYGEN SATURATION: 97 % | HEIGHT: 71 IN

## 2024-04-24 LAB
ANION GAP SERPL CALCULATED.3IONS-SCNC: 10.2 MMOL/L (ref 5–15)
BUN SERPL-MCNC: 18 MG/DL (ref 8–23)
BUN/CREAT SERPL: 13.1 (ref 7–25)
CALCIUM SPEC-SCNC: 9.2 MG/DL (ref 8.6–10.5)
CHLORIDE SERPL-SCNC: 106 MMOL/L (ref 98–107)
CHOLEST SERPL-MCNC: 127 MG/DL (ref 0–200)
CO2 SERPL-SCNC: 22.8 MMOL/L (ref 22–29)
CREAT SERPL-MCNC: 1.37 MG/DL (ref 0.76–1.27)
DEPRECATED RDW RBC AUTO: 39.9 FL (ref 37–54)
EGFRCR SERPLBLD CKD-EPI 2021: 54.5 ML/MIN/1.73
ERYTHROCYTE [DISTWIDTH] IN BLOOD BY AUTOMATED COUNT: 12.2 % (ref 12.3–15.4)
GLUCOSE BLDC GLUCOMTR-MCNC: 195 MG/DL (ref 70–130)
GLUCOSE BLDC GLUCOMTR-MCNC: 232 MG/DL (ref 70–130)
GLUCOSE SERPL-MCNC: 163 MG/DL (ref 65–99)
HCT VFR BLD AUTO: 40.7 % (ref 37.5–51)
HDLC SERPL-MCNC: 28 MG/DL (ref 40–60)
HGB BLD-MCNC: 13.2 G/DL (ref 13–17.7)
LDLC SERPL CALC-MCNC: 65 MG/DL (ref 0–100)
LDLC/HDLC SERPL: 2.09 {RATIO}
MCH RBC QN AUTO: 29.1 PG (ref 26.6–33)
MCHC RBC AUTO-ENTMCNC: 32.4 G/DL (ref 31.5–35.7)
MCV RBC AUTO: 89.8 FL (ref 79–97)
PLATELET # BLD AUTO: 177 10*3/MM3 (ref 140–450)
PMV BLD AUTO: 9.8 FL (ref 6–12)
POTASSIUM SERPL-SCNC: 4.2 MMOL/L (ref 3.5–5.2)
RBC # BLD AUTO: 4.53 10*6/MM3 (ref 4.14–5.8)
SODIUM SERPL-SCNC: 139 MMOL/L (ref 136–145)
TRIGL SERPL-MCNC: 203 MG/DL (ref 0–150)
VLDLC SERPL-MCNC: 34 MG/DL (ref 5–40)
WBC NRBC COR # BLD AUTO: 7.67 10*3/MM3 (ref 3.4–10.8)

## 2024-04-24 PROCEDURE — 99239 HOSP IP/OBS DSCHRG MGMT >30: CPT

## 2024-04-24 PROCEDURE — 82948 REAGENT STRIP/BLOOD GLUCOSE: CPT

## 2024-04-24 PROCEDURE — 63710000001 INSULIN LISPRO (HUMAN) PER 5 UNITS

## 2024-04-24 PROCEDURE — 63710000001 INSULIN GLARGINE PER 5 UNITS: Performed by: INTERNAL MEDICINE

## 2024-04-24 PROCEDURE — 80061 LIPID PANEL: CPT | Performed by: INTERNAL MEDICINE

## 2024-04-24 PROCEDURE — 99232 SBSQ HOSP IP/OBS MODERATE 35: CPT | Performed by: SPECIALIST

## 2024-04-24 PROCEDURE — 94761 N-INVAS EAR/PLS OXIMETRY MLT: CPT

## 2024-04-24 PROCEDURE — 94799 UNLISTED PULMONARY SVC/PX: CPT

## 2024-04-24 PROCEDURE — 85027 COMPLETE CBC AUTOMATED: CPT | Performed by: INTERNAL MEDICINE

## 2024-04-24 PROCEDURE — 80048 BASIC METABOLIC PNL TOTAL CA: CPT | Performed by: INTERNAL MEDICINE

## 2024-04-24 RX ORDER — ATORVASTATIN CALCIUM 40 MG/1
40 TABLET, FILM COATED ORAL NIGHTLY
Qty: 30 TABLET | Refills: 0 | Status: SHIPPED | OUTPATIENT
Start: 2024-04-24 | End: 2024-04-29 | Stop reason: SDUPTHER

## 2024-04-24 RX ORDER — ATORVASTATIN CALCIUM 40 MG/1
40 TABLET, FILM COATED ORAL NIGHTLY
Qty: 30 TABLET | Refills: 0 | Status: SHIPPED | OUTPATIENT
Start: 2024-04-24 | End: 2024-04-24

## 2024-04-24 RX ADMIN — INSULIN LISPRO 3 UNITS: 100 INJECTION, SOLUTION INTRAVENOUS; SUBCUTANEOUS at 11:31

## 2024-04-24 RX ADMIN — DORZOLAMIDE HCL 1 DROP: 20 SOLUTION/ DROPS OPHTHALMIC at 08:55

## 2024-04-24 RX ADMIN — Medication 10 ML: at 08:56

## 2024-04-24 RX ADMIN — TICAGRELOR 90 MG: 90 TABLET ORAL at 08:55

## 2024-04-24 RX ADMIN — DULOXETINE HYDROCHLORIDE 60 MG: 60 CAPSULE, DELAYED RELEASE ORAL at 08:58

## 2024-04-24 RX ADMIN — ASPIRIN 81 MG: 81 TABLET, COATED ORAL at 08:55

## 2024-04-24 RX ADMIN — Medication 1 TABLET: at 08:58

## 2024-04-24 RX ADMIN — INSULIN GLARGINE 10 UNITS: 100 INJECTION, SOLUTION SUBCUTANEOUS at 08:55

## 2024-04-24 RX ADMIN — INSULIN LISPRO 2 UNITS: 100 INJECTION, SOLUTION INTRAVENOUS; SUBCUTANEOUS at 08:55

## 2024-04-24 RX ADMIN — TIMOLOL MALEATE 1 DROP: 5 SOLUTION OPHTHALMIC at 08:55

## 2024-04-24 RX ADMIN — DOCUSATE SODIUM 50 MG AND SENNOSIDES 8.6 MG 2 TABLET: 8.6; 5 TABLET, FILM COATED ORAL at 08:55

## 2024-04-24 RX ADMIN — GABAPENTIN 100 MG: 100 CAPSULE ORAL at 08:55

## 2024-04-24 RX ADMIN — BRIMONIDINE TARTRATE 1 DROP: 2 SOLUTION/ DROPS OPHTHALMIC at 08:55

## 2024-04-24 NOTE — PHARMACY PATIENT ASSISTANCE
Pharmacy checked on cost of new medication Brilinta. Per patient's insurance, it is covered with a $386.67 copay, the cost of which is primarily due to a deductible. Confirmed patient is eligible for a one-time-use 30 day free trial card which has been profiled in our [facility] for discharge. May consider switching to Plavix at follow-up for affordability if medically appropriate.    Thank you,  Dara Lopez, PharmD

## 2024-04-24 NOTE — DISCHARGE SUMMARY
[facility] HOSPITALIST DISCHARGE SUMMARY    Patient Identification:  Name:  Hector Johnston Jr.  Age:  74 y.o.  Sex:  male  :  1950  MRN:  [id number]  Visit Number:  [id number]    Date of Admission: 2024  Date of Discharge:  24     PCP: Anastacia Naidu APRN    Discharging Provider: Brandan De La Cruz PA-C / Dr. Madden    Discharge Diagnoses     Discharge Diagnoses:  NSTEMI, T1  CAD s/p stent x 2 to LAD  Chest pain with mixed features  Hypertension  Uncontrolled IDDM with hyperglycemia    Secondary Diagnoses:  Diabetic retinopathy  Glaucoma    Needs on follow up:  Cardiology 2 weeks, PCP 1 week  Consults/Procedures     Consults:   Consults       Date and Time Order Name Status Description    2024 10:53 AM Inpatient Interventional Cardiology Consult      2024  5:10 AM Inpatient Cardiology Consult Completed             Procedures/Scans Performed:  Procedure(s):  Left Heart Cath   CXR  CTA coronaries- planned  Cardiac catheterization 24    History of Presenting Illness     Chief Complaint   Patient presents with    Chest Pain       Patient is a 74 y.o. male who presented to [facility] complaining of chest pain.  Please see the admitting history and physical for further details.      Hospital Course     Patient was admitted to [facility] on 2024 following presentation to [facility] ED for further evaluation of chest pain.  He reported intermittent chest pain/pressure over the previous 3 weeks which had initially improved with reflux medications though had become more frequent with episodes lasting longer.  He was having associated nausea, shortness of breath, dizziness.  Pain was worse with activity.  On workup in the ED HS troponin found to be 54 with repeat of 60.  He was given aspirin and admitted to the telemetry unit for further workup and management.  Daily ASA, statin continued.    TTE this admission did show normal LVEF at 61 to 65% with grade 1 diastolic  dysfunction.  There was aortic valve sclerosis.  Cardiology was consulted for further assistance and recommendations-patient was further evaluated with CTA of the coronaries which showed a noncalcified high risk plaque in the proximal LAD with occlusion and distal reconstitution of flow.  There was also mild plaque in the left main and left circumflex arteries without significant stenosis.  Total calcium score was 349.  Given these results interventional cardiology was consulted and patient did undergo cardiac catheterization on 4/23/2024 and had stent x 2 to the LAD.  ASA, high intensity statin continued and Brilinta added to regimen.  Patient has remained stable postprocedure without further reported chest pain.  Labs repeated and noted mild increase in creatinine to 1.37-had been 1.1-1.2 this admission.  Also of note patient's home losartan was held on admission and given BP trend overall fairly well-controlled and creatinine slightly above baseline we will continue to hold this medication on discharge.  Home atorvastatin dose increased to 40 mg daily.  Patient will be scheduled to follow-up with cardiology in 2 weeks.  He will also follow-up with his PCP in 1 week.  Given clinically stable with no further inpatient workup planned Case was discussed with attending physician and agree patient is stable for discharge home on this date.  The above discharge and follow-up plan as well as medication changes were discussed with the patient and spouse at bedside.  Expressed agreement understanding.        Discharge Vitals/Physical Examination     Vital Signs:  Temp:  [98.1 °F (36.7 °C)-98.8 °F (37.1 °C)] 98.7 °F (37.1 °C)  Heart Rate:  [61-97] 70  Resp:  [16-20] 20  BP: (118-157)/(63-89) 141/85  Mean Arterial Pressure (Non-Invasive) for the past 24 hrs (Last 3 readings):   Noninvasive MAP (mmHg)   04/24/24 1030 103   04/24/24 0641 87   04/24/24 0338 98     SpO2 Percentage    04/24/24 0641 04/24/24 1017 04/24/24 1030    SpO2: 98% 97% 97%     SpO2:  [95 %-98 %] 97 %  on  Flow (L/min):  [2] 2;   Device (Oxygen Therapy): room air    Body mass index is 26.94 kg/m².  Wt Readings from Last 3 Encounters:   04/24/24 87.6 kg (193 lb 2 oz)   01/14/24 90.7 kg (200 lb)   12/06/23 95.3 kg (210 lb)         Physical Exam:  Physical Exam  Vitals and nursing note reviewed.   Constitutional:       General: He is not in acute distress.  HENT:      Head: Normocephalic and atraumatic.   Eyes:      Extraocular Movements: Extraocular movements intact.      Conjunctiva/sclera: Conjunctivae normal.   Cardiovascular:      Rate and Rhythm: Normal rate and regular rhythm.   Pulmonary:      Effort: Pulmonary effort is normal.      Breath sounds: Normal breath sounds.   Abdominal:      Palpations: Abdomen is soft.   Musculoskeletal:      Right lower leg: No edema.      Left lower leg: No edema.   Skin:     General: Skin is warm and dry.   Neurological:      Mental Status: He is alert. Mental status is at baseline.   Psychiatric:         Mood and Affect: Mood normal.         Behavior: Behavior normal.         Pertinent Laboratory/Radiology Results     Pertinent Laboratory Results:  Results from last 7 days   Lab Units 04/22/24  0528 04/21/24  1820 04/21/24  1623   HSTROP T ng/L 129* 60* 54*     Results from last 7 days   Lab Units 04/21/24  1623   PROBNP pg/mL 429.1     Results from last 7 days   Lab Units 04/24/24  0039   CHOLESTEROL mg/dL 127   TRIGLYCERIDES mg/dL 203*   HDL CHOL mg/dL 28*   LDL CHOL mg/dL 65       Results from last 7 days   Lab Units 04/24/24  0039 04/23/24  0630 04/22/24  0223 04/21/24  1623   WBC 10*3/mm3 7.67 5.66 6.83 6.34   HEMOGLOBIN g/dL 13.2 13.1 12.7* 13.9   HEMATOCRIT % 40.7 38.8 37.8 42.8   MCV fL 89.8 88.4 90.2 89.4   MCHC g/dL 32.4 33.8 33.6 32.5   PLATELETS 10*3/mm3 177 177 171 203   INR   --   --   --  0.86*     Results from last 7 days   Lab Units 04/24/24  0039 04/23/24  0630 04/22/24  0528 04/21/24  1820 04/21/24  1623  "  SODIUM mmol/L 139 139 140  --  139   POTASSIUM mmol/L 4.2 4.2 4.1  --  4.8   MAGNESIUM mg/dL  --   --  1.7 1.9 1.8   CHLORIDE mmol/L 106 107 106  --  105   CO2 mmol/L 22.8 22.4 24.8  --  24.8   BUN mg/dL 18 14 17  --  21   CREATININE mg/dL 1.37* 1.23 1.11  --  1.29*   CALCIUM mg/dL 9.2 8.8 8.9  --  9.7   GLUCOSE mg/dL 163* 175* 194*  --  244*   ALBUMIN g/dL  --  3.8  --   --  4.1   BILIRUBIN mg/dL  --  0.3  --   --  0.2   ALK PHOS U/L  --  53  --   --  59   AST (SGOT) U/L  --  23  --   --  25   ALT (SGPT) U/L  --  22  --   --  25   Estimated Creatinine Clearance: 59.5 mL/min (A) (by C-G formula based on SCr of 1.37 mg/dL (H)).  No results found for: \"AMMONIA\"    Hemoglobin A1C   Date/Time Value Ref Range Status   04/22/2024 0223 9.40 (H) 4.80 - 5.60 % Final     Glucose   Date/Time Value Ref Range Status   04/24/2024 0641 195 (H) 70 - 130 mg/dL Final   04/23/2024 2053 211 (H) 70 - 130 mg/dL Final   04/23/2024 1631 219 (H) 70 - 130 mg/dL Final   04/23/2024 1040 168 (H) 70 - 130 mg/dL Final   04/23/2024 0656 154 (H) 70 - 130 mg/dL Final   04/22/2024 2056 251 (H) 70 - 130 mg/dL Final   04/22/2024 1712 196 (H) 70 - 130 mg/dL Final   04/22/2024 1122 133 (H) 70 - 130 mg/dL Final     Lab Results   Component Value Date    HGBA1C 9.40 (H) 04/22/2024     Lab Results   Component Value Date    TSH 1.800 04/21/2024    FREET4 0.85 (L) 04/21/2024       No results found for: \"BLOODCX\"  No results found for: \"URINECX\"  No results found for: \"WOUNDCX\"  No results found for: \"STOOLCX\"  No results found for: \"RESPCX\"  Pain Management Panel          Latest Ref Rng & Units 10/15/2022   Pain Management Panel   Creatinine, Urine mg/dL  mg/dL 48.5  48.5        Pertinent Radiology Results:  Imaging Results (All)       Procedure Component Value Units Date/Time    CT Angiogram Heart With 3D Image [[id number]] Collected: 04/22/24 1423     Updated: 04/22/24 6178    Narrative:         EXAMINATION: CT ANGIOGRAM HEART W 3D IMAGE-     CLINICAL " INDICATION: Chest pain, nonspecific; I21.4-Non-ST elevation  (NSTEMI) myocardial infarction     COMPARISON: None     TECHNIQUE: Axial computed tomographic angiography images of the coronary  arteries after IV contrast administration.  Sublingual nitroglycerine  for coronary vasodilation and IV metoprolol to reduce heart rate were  administered as needed. Refer to radiology nursing notes for details of  medications administered prior to the exam. 3D reconstructions were  obtained as well as MIP reconstructions.     FINDINGS:     Coronary vasculature:  LEFT coronary artery dominance.  Left main: Mild calcified plaque without significant stenosis or  occlusion.  LAD: Moderate-advanced calcified and noncalcified plaque. Noncalcified  high risk plaque of the proximal LAD with complete occlusion. Supplies 2  diagonal branches.  LCx: Supplies PDA and L-PLB. Appears to supply at least 2 obtuse  marginal branches. No significant plaque, stenosis, or occlusion.  AM1: No significant plaque. No occlusion.  OM1: Appears patent. No significant plaque.  OM2:  RCA: Somewhat diminutive. Minimal calcified plaque. No significant  stenosis or occlusion.  D1: No significant plaque. No occlusion.  D2: No significant plaque. No occlusion.  PDA: Somewhat diminutive. Appears patent without obvious plaque.  L-PLB: No significant plaque. No occlusion.     Calcium score:  Total calcium score: 349  Percentile for patient age/demographic: 50-75th  Total calcium volume: 281  LM: 23  LAD: 288  LCx: 38  RCA: 0  PDA: 0  Other: 0      Heart:  Mild cardiac enlargement is noted.  No pericardial effusion.     Valves:  Aortic valve: Trileaflet. No irregular thickening or calcifications  identified.  Mitral valve: No irregular thickening or calcifications.     Mediastinum:  Three-vessel arch configuration noted. Plaque is noted involving the  origins of the arch vessels but no significant stenosis or occlusion  identified.  Aortic root is 3.70 cm at level  of sinus of Valsalva. No aneurysm.      Lymph nodes:  No enlarged mediastinal or hilar lymph nodes.     Lung fields:  Left lower lobe airspace disease which may represent atelectasis or  pneumonia. Bronchial wall thickening is noted.     Visualized pleural spaces:  Unremarkable as visualized.     Visualized bony structures:  Mild degenerative changes thoracic spine. No acute bony findings are  identified.       Visualized upper abdomen:  Mild diffuse fatty infiltration of liver. Upper abdomen otherwise  unremarkable as visualized.           Impression:      1. Noncalcified high risk plaque proximal LAD with occlusion. Distal  reconstitution of flow noted.  2. Left dominant coronary artery system.  3. Mild calcified plaque left main and left circumflex arteries but  without significant stenosis or occlusion.  4. Total calcium score: 349; corresponds to 50-75th percentile for  patient age/demographic.  5. Left lower lobe airspace disease which may represent atelectasis or  pneumonia.  6. Other incidental/nonacute findings as above.     ASSESSMENT: CAD RADS 5/P3/HRP        This report was finalized on 4/22/2024 5:56 PM by Dr. Mark Chu MD.       XR Chest 1 View [[id number]] Collected: 04/21/24 1738     Updated: 04/21/24 1740    Narrative:      INDICATION: Chest pain.     TECHNIQUE: Frontal radiograph of the chest.     COMPARISON: 1/14/2021.     FINDINGS:   Elevation of the left hemidiaphragm. Cardiomegaly. Chronic appearing  interstitial lung markings. Pulmonary vasculature appear within normal  limits. No infiltrate, pleural effusion or pneumothorax. No acute  osseous abnormality evident.       Impression:      No acute cardiopulmonary process.        This report was finalized on 4/21/2024 5:38 PM by Alex Pallas, DO.               Discharge Disposition/Discharge Medications/Discharge Appointments     Discharge Disposition:   Home or Self Care    Condition at Discharge:  Stable     Discharge Medications:     Your  medication list        START taking these medications        Instructions Last Dose Given Next Dose Due   ticagrelor 90 MG tablet tablet  Commonly known as: BRILINTA      Take 1 tablet by mouth 2 (Two) Times a Day.              CHANGE how you take these medications        Instructions Last Dose Given Next Dose Due   atorvastatin 40 MG tablet  Commonly known as: LIPITOR  What changed:   medication strength  how much to take      Take 1 tablet by mouth Every Night.              CONTINUE taking these medications        Instructions Last Dose Given Next Dose Due   aspirin 81 MG EC tablet      Take 1 tablet by mouth Every Morning.       brimonidine 0.1 % solution ophthalmic solution  Commonly known as: ALPHAGAN P      Administer 1 drop into the left eye 2 (Two) Times a Day.       dorzolamide 2 % ophthalmic solution  Commonly known as: TRUSOPT      Administer 1 drop into the left eye 3 (Three) Times a Day.       DULoxetine 60 MG capsule  Commonly known as: CYMBALTA      Take 1 capsule by mouth Daily.       fenofibrate micronized 134 MG capsule  Commonly known as: LOFIBRA      Take 1 capsule by mouth Every Morning Before Breakfast.       gabapentin 100 MG capsule  Commonly known as: NEURONTIN      Take 1 capsule by mouth 2 (Two) Times a Day.       glimepiride 4 MG tablet  Commonly known as: AMARYL      Take 1 tablet by mouth 2 (Two) Times a Day.       glipizide 5 MG tablet  Commonly known as: GLUCOTROL      Take 1 tablet by mouth 2 (Two) Times a Day Before Meals.       Lantus SoloStar 100 UNIT/ML injection pen  Generic drug: Insulin Glargine      Inject 30 Units under the skin into the appropriate area as directed Daily.       latanoprost 0.005 % ophthalmic solution  Commonly known as: XALATAN      Administer 1 drop into the left eye Every Night.       metFORMIN 1000 MG tablet  Commonly known as: GLUCOPHAGE      Take 1 tablet by mouth 2 (Two) Times a Day With Meals.       multivitamin with minerals tablet tablet      Take  1 tablet by mouth Every Morning.       timolol 0.5 % ophthalmic solution  Commonly known as: TIMOPTIC      Administer 1 drop to both eyes 2 (Two) Times a Day.       True Metrix Blood Glucose Test test strip  Generic drug: glucose blood      Use to test blood sugar 3 times a day & as needed       TRUEplus Lancets 33G misc      Use to test blood sugar 3 times a day & as needed              STOP taking these medications      valsartan 40 MG tablet  Commonly known as: DIOVAN                  Where to Get Your Medications        These medications were sent to [address] [address]      Hours: Monday to Friday 7 AM to 6 PM Phone: [phone]   atorvastatin 40 MG tablet  ticagrelor 90 MG tablet tablet          Discharge Diet:  heart healthy, diabetic- consistent carb    Discharge Activity:  as tolerated      Time spent on this discharge exceeded 30 minutes.

## 2024-04-24 NOTE — PLAN OF CARE
Goal Outcome Evaluation:               No acute events overnight. No signs of bleeding or hematoma from right femoral cath site. Will continue to follow plan of care.

## 2024-04-24 NOTE — TELEPHONE ENCOUNTER
Caller: VickieBeba NOT ON VERB BUT PT GAVE PERMISSION     Relationship: Emergency Contact    Best call back number: [phone]     What is the best time to reach you: ANYTIME     What was the call regarding: STATES THEY WERE GIVEN A HOS DISCHARGE NOTE THAT [facility] PT HAS AN APPT WITH DR. LANG ON 4/26 BUT NO TIME. THIS HAS OUR NUMBER/ ADDRESS BUT NOTHING IN CHART. PLEASE ADVISE. PT HAS A REF IN SYSTEM BUT  WAS CONSULTED BY DR. LANG IN HOS.     Is it okay if the provider responds through MyChart: CALL

## 2024-04-24 NOTE — PROGRESS NOTES
[facility]  PROGRESS NOTE    Patient information:  Name: Hector Johnston Jr.  Age/Sex: 74 y.o. male  :  1950        PCP: Anastacia Naidu APRN  Attending: Shalonda Farnsworth MD  MRN:  [id number]  Visit Number:  [id number]    LOS: 2  CODE STATUS:    Code Status and Medical Interventions:   Ordered at: 24 1244     Level Of Support Discussed With:    Patient     Code Status (Patient has no pulse and is not breathing):    CPR (Attempt to Resuscitate)     Medical Interventions (Patient has pulse or is breathing):    Full Support       PROBLEM LIST:Principal Problem:    NSTEMI (non-ST elevated myocardial infarction)      Reason for Cardiology follow-up: NSTEMI     Subjective   ADMISSION INFORMATION:  Chief Complaint   Patient presents with    Chest Pain       HPI:  Hector Johnston Jr. is a 74-year-old male with a past medical history significant for hypertension, diabetes mellitus type 2, and diabetic retinopathy.  He reported over the past 2 to 3 weeks he has been having intermittent chest pressure in the epigastric region.  This usually occurs about 30 minutes after eating lunch.  He reported he is fairly physically active but has not noticed any chest pain with exertion.  Initial high-sensitivity troponin in the ED was 54 with subsequent troponin 60 and 129.  An echocardiogram has been ordered but pending.  Creatinine 1.1.  Potassium 4.1.  Aspirin and statin were ordered and patient was placed on a heparin drip.  Cardiology was consulted for non-STEMI.  Of note, patient did have a left heart catheterization in 2018 by Dr. Jose Dickerson at [facility] which revealed normal coronary arteries.       Interval History:   Telemetry reveals 314 A. AM labs reveal creatinine 1.37 which is a bump up from 1.23..     Patient was in room 314 A when he was seen and examined.  Patient is on a bed resting quietly.  No acute distress noted this time.  Patient  has no acute complaints at this time.  X 1 adult female is at bedside.    Vital Signs  Temp:  [98.1 °F (36.7 °C)-98.8 °F (37.1 °C)] 98.5 °F (36.9 °C)  Heart Rate:  [61-97] 74  Resp:  [16-20] 20  BP: (112-157)/(63-89) 118/63  Flow (L/min):  [2] 2  Device (Oxygen Therapy): room air  Vital Signs (last 72 hrs)         04/21 0700 04/22 0659 04/22 0700 04/23 0659 04/23 0700 04/24 0659 04/24 0700 04/24 0730   Most Recent      Temp (°F) 97.7 -  98.1    97.6 -  98.5    98.1 -  98.8       98.5 (36.9) 04/24 0641    Heart Rate 58 -  80    61 -  81    61 -  97       74 04/24 0641    Resp 16 -  18    18 -  20    16 -  20       20 04/24 0641    BP 95/54 -  154/93    111/69 -  128/75    112/73 -  157/89       118/63 04/24 0641    SpO2 (%) 94 -  98    94 -  98    95 -  98       98 04/24 0641    Flow (L/min)       2       2 04/23 1131          BMI:Body mass index is 26.94 kg/m².    WEIGHT:      04/22/24  0500 04/23/24  0500 04/24/24  0522   Weight: 91 kg (200 lb 9.9 oz) (New admit less than 24 hours.) 92.3 kg (203 lb 7.8 oz) 87.6 kg (193 lb 2 oz)       DIET:Diet: Cardiac, Diabetic; Healthy Heart (2-3 Na+); Consistent Carbohydrate; Fluid Consistency: Thin (IDDSI 0)    I&O:  Intake & Output (last 3 days)         04/21 0701 04/22 0700 04/22 0701 04/23 0700 04/23 0701 04/24 0700 04/24 0701 04/25 0700    P.O.  600 1080     I.V. (mL/kg)   60.4 (0.7)     Total Intake(mL/kg)  600 (6.6) 1140.4 (12.6)     Urine (mL/kg/hr)  250 (0.1)      Total Output  250      Net  +350 +1140.4             Urine Unmeasured Occurrence 3 x 5 x 3 x              Objective     Physical Exam:      General Appearance:    Alert, cooperative, in no acute distress.   Head:    Normocephalic, without obvious abnormality, atraumatic.   Eyes:                          Conjunctivae and sclerae normal, no icterus, no pallor, corneas clear.   Neck:   No adenopathy, supple, trachea midline, no thyromegaly, no carotid bruit, no JVD.   Lungs:     Clear to auscultation,  respirations regular, even and             unlabored.    Heart:    Regular rhythm and normal rate, normal S1 and S2, no          murmur, no gallop, no rub, no click   Chest Wall:    No abnormalities observed.   Abdomen:     Normal bowel sounds, no masses, no organomegaly, soft nontender, nondistended, no guarding, no rebound tenderness.   Extremities:   Moves all extremities well, no edema, no cyanosis, no           redness.   Pulses:   Pulses palpable and equal bilaterally.   Skin:   No bleeding, bruising or rash.   Neurologic:   Alert and Oriented x 3, Speech Clear & comprehensive.       Results review   Results Review:   Results from last 7 days   Lab Units 04/22/24  0528 04/21/24  1820 04/21/24  1623   HSTROP T ng/L 129* 60* 54*     Lab Results   Component Value Date    PROBNP 429.1 04/21/2024    PROBNP 68.2 01/17/2021     Results from last 7 days   Lab Units 04/24/24  0039 04/23/24  0630 04/22/24  0223 04/21/24  1623   WBC 10*3/mm3 7.67 5.66 6.83 6.34   HEMOGLOBIN g/dL 13.2 13.1 12.7* 13.9   PLATELETS 10*3/mm3 177 177 171 203     Results from last 7 days   Lab Units 04/24/24  0039 04/23/24  0630 04/22/24  0528 04/21/24  1623   SODIUM mmol/L 139 139 140 139   POTASSIUM mmol/L 4.2 4.2 4.1 4.8   CHLORIDE mmol/L 106 107 106 105   CO2 mmol/L 22.8 22.4 24.8 24.8   BUN mg/dL 18 14 17 21   CREATININE mg/dL 1.37* 1.23 1.11 1.29*   CALCIUM mg/dL 9.2 8.8 8.9 9.7   GLUCOSE mg/dL 163* 175* 194* 244*   ALT (SGPT) U/L  --  22 --  25   AST (SGOT) U/L  --  23  --  25     Lab Results   Component Value Date    MG 1.7 04/22/2024    MG 1.9 04/21/2024    MG 1.8 04/21/2024     Estimated Creatinine Clearance: 59.5 mL/min (A) (by C-G formula based on SCr of 1.37 mg/dL (H)).    Lab Results   Component Value Date    HGBA1C 9.40 (H) 04/22/2024    HGBA1C 8.40 (H) 05/04/2018     Lab Results   Component Value Date    CHOL 127 04/24/2024    TRIG 203 (H) 04/24/2024    LDL 65 04/24/2024    HDL 28 (L) 04/24/2024        Lab Results   Component  Value Date    INR 0.86 (L) 04/21/2024     Lab Results   Component Value Date    LABHEPA <0.10 (L) 04/23/2024    LABHEPA 1.01 (C) 04/23/2024    LABHEPA 0.34 04/22/2024    LABHEPA 0.44 04/22/2024       Lab Results   Component Value Date    TSH 1.800 04/21/2024    PSA 1.380 04/26/2017      Pain Management Panel          Latest Ref Rng & Units 10/15/2022   Pain Management Panel   Creatinine, Urine mg/dL  mg/dL 48.5  48.5      Microbiology Results (last 10 days)       ** No results found for the last 240 hours. **           Imaging Results (Last 24 Hours)       ** No results found for the last 24 hours. **          ECHO:  Results for orders placed during the hospital encounter of 04/21/24    Adult Transthoracic Echo Complete W/ Cont if Necessary Per Protocol    Interpretation Summary    Normal left ventricular cavity size and wall thickness noted. All left ventricular wall segments contract normally.    Left ventricular ejection fraction appears to be 61 - 65%.    Left ventricular diastolic function is consistent with (grade I) impaired relaxation.    The aortic valve is abnormal in structure. The aortic valve exhibits sclerosis. The aortic valve appears trileaflet. Mild aortic valve regurgitation is present. No aortic valve stenosis is present.    The mitral valve is structurally normal with no regurgitation or significant stenosis present.    There is no evidence of pericardial effusion. .      STRESS TEST:  None found in patient's chart.      HEART CATH:  Results for orders placed during the hospital encounter of 04/21/24    Cardiac Catheterization/Vascular Study    Conclusion    The ejection fraction was 50-55% by visual estimate.    Mid LAD lesion is 70% stenosed.    Prox LAD to Mid LAD lesion is 100% stenosed.    1.  Cardiac.  Patient with 100% occluded mid LAD.  Patient underwent 2 stents to the LAD.  Reperfusion noted.  Aggressive risk factor modification for coronary artery disease to continue.  Dual  antiplatelet therapy to continue for a year and aspirin for life.      TELEMETRY:     SR 70s        I reviewed the patient's new clinical results.    ALLERGIES: Patient has no known allergies.    Medication Review:   Current list of medications may not reflect those currently placed in orders that are not signed or are being held.     aspirin, 81 mg, Oral, Daily  atorvastatin, 40 mg, Oral, Nightly  brimonidine, 1 drop, Left Eye, BID  dorzolamide, 1 drop, Left Eye, TID  DULoxetine, 60 mg, Oral, Daily  gabapentin, 100 mg, Oral, BID  insulin glargine, 10 Units, Subcutaneous, Daily  insulin lispro, 2-7 Units, Subcutaneous, 4x Daily AC & at Bedtime  latanoprost, 1 drop, Left Eye, Nightly  multivitamin with minerals, 1 tablet, Oral, QAM  senna-docusate sodium, 2 tablet, Oral, BID  sodium chloride, 10 mL, Intravenous, Q12H  ticagrelor, 90 mg, Oral, BID  timolol, 1 drop, Both Eyes, BID      sodium chloride, 100 mL/hr        acetaminophen    senna-docusate sodium **AND** polyethylene glycol **AND** bisacodyl **AND** bisacodyl    dextrose    dextrose    glucagon (human recombinant)    nitroglycerin    sodium chloride    sodium chloride    sodium chloride    Assessment    Status post PCI of the LAD  Essential hypertension  Diabetes mellitus type 2 with diabetic retinopathy  Dyslipidemia            Plan   1.  Now patient had PCI of the LAD after being admitted with NSTEMI type I secondary to stenosis LAD he is doing very well we will continue with dual antiplatelet therapy for at least a year  2.  Continue with the statin and beta-blockers  3.  Potentially patient can be discharged home today if okay with medicine service and follow-up with cardiology in 2 to 4 weeks in the office          I have discussed the patients findings and recommendations with patient.    Thank you very much for asking us to be involved in this patient's care.  We will follow along with you.    Electronically signed by EDMOND Sarmiento, 04/24/24,  10:31 AM EDT.   Electronically signed by Prudencio Brown MD, 04/24/24, 10:52 AM EDT.                      Please note that portions of this note were completed with a voice recognition program.    Please note that portions of this note were copied and has been reviewed and is accurate as of 4/24/2024 .

## 2024-04-24 NOTE — NURSING NOTE
Order received for Cardiac Rehab Consultation.       Information discussed with: Patient/Spouse        Educated on: Benefits of Exercise,  Educated on Cardiac Rehab and Program Protocol, Brochure and/or educational material provided, Contact information given, and Teach Back Verified          Comments: Pt stated that he would like to think it over and will call us to get scheduled if he decides he wants to attend.       Thank you for the referral. Please contact the Cardiac Rehab Dept. (ext. [phone]) with any further questions or concerns.

## 2024-04-25 NOTE — OUTREACH NOTE
Prep Survey      Flowsheet Row Responses   [facility] patient discharged from? [facility]   Is LACE score < 7 ? No   Eligibility Readm Mgmt   Discharge diagnosis NSTEMI, heart cath & stent x 2   Does the patient have one of the following disease processes/diagnoses(primary or secondary)? Acute MI (STEMI,NSTEMI)   Does the patient have Home health ordered? No   Is there a DME ordered? No   Prep survey completed? Yes            Shira Garnett Registered Nurse

## 2024-04-26 ENCOUNTER — SPECIALTY PHARMACY (OUTPATIENT)
Dept: PHARMACY | Facility: HOSPITAL | Age: 74
End: 2024-04-26
Payer: MEDICARE

## 2024-04-26 ENCOUNTER — OFFICE VISIT (OUTPATIENT)
Dept: FAMILY MEDICINE CLINIC | Facility: CLINIC | Age: 74
End: 2024-04-26
Payer: MEDICARE

## 2024-04-26 ENCOUNTER — OFFICE VISIT (OUTPATIENT)
Dept: ENDOCRINOLOGY | Facility: CLINIC | Age: 74
End: 2024-04-26
Payer: MEDICARE

## 2024-04-26 VITALS
HEART RATE: 83 BPM | WEIGHT: 199.2 LBS | SYSTOLIC BLOOD PRESSURE: 122 MMHG | BODY MASS INDEX: 27.89 KG/M2 | TEMPERATURE: 97 F | HEIGHT: 71 IN | DIASTOLIC BLOOD PRESSURE: 64 MMHG | OXYGEN SATURATION: 99 %

## 2024-04-26 VITALS
HEIGHT: 71 IN | SYSTOLIC BLOOD PRESSURE: 114 MMHG | OXYGEN SATURATION: 97 % | WEIGHT: 201 LBS | DIASTOLIC BLOOD PRESSURE: 74 MMHG | HEART RATE: 92 BPM | BODY MASS INDEX: 28.14 KG/M2

## 2024-04-26 DIAGNOSIS — M54.16 LUMBAR RADICULOPATHY, CHRONIC: Primary | ICD-10-CM

## 2024-04-26 DIAGNOSIS — E11.65 TYPE 2 DIABETES MELLITUS WITH HYPERGLYCEMIA, WITH LONG-TERM CURRENT USE OF INSULIN: ICD-10-CM

## 2024-04-26 DIAGNOSIS — Z79.4 TYPE 2 DIABETES MELLITUS WITH HYPERGLYCEMIA, WITH LONG-TERM CURRENT USE OF INSULIN: Primary | ICD-10-CM

## 2024-04-26 DIAGNOSIS — Z79.4 TYPE 2 DIABETES MELLITUS WITH HYPERGLYCEMIA, WITH LONG-TERM CURRENT USE OF INSULIN: ICD-10-CM

## 2024-04-26 DIAGNOSIS — E11.65 TYPE 2 DIABETES MELLITUS WITH HYPERGLYCEMIA, WITH LONG-TERM CURRENT USE OF INSULIN: Primary | ICD-10-CM

## 2024-04-26 LAB
AMPHET+METHAMPHET UR QL: NEGATIVE
AMPHETAMINES UR QL: NEGATIVE
BARBITURATES UR QL SCN: NEGATIVE
BENZODIAZ UR QL SCN: NEGATIVE
BUPRENORPHINE SERPL-MCNC: NEGATIVE NG/ML
CANNABINOIDS SERPL QL: NEGATIVE
COCAINE UR QL: NEGATIVE
FENTANYL UR-MCNC: NEGATIVE NG/ML
METHADONE UR QL SCN: NEGATIVE
OPIATES UR QL: NEGATIVE
OXYCODONE UR QL SCN: NEGATIVE
PCP UR QL SCN: NEGATIVE
TRICYCLICS UR QL SCN: NEGATIVE

## 2024-04-26 PROCEDURE — 3046F HEMOGLOBIN A1C LEVEL >9.0%: CPT | Performed by: NURSE PRACTITIONER

## 2024-04-26 PROCEDURE — 80307 DRUG TEST PRSMV CHEM ANLYZR: CPT | Performed by: NURSE PRACTITIONER

## 2024-04-26 PROCEDURE — 99214 OFFICE O/P EST MOD 30 MIN: CPT | Performed by: NURSE PRACTITIONER

## 2024-04-26 RX ORDER — GABAPENTIN 300 MG/1
300 CAPSULE ORAL 3 TIMES DAILY
Qty: 60 CAPSULE | Refills: 1 | Status: SHIPPED | OUTPATIENT
Start: 2024-04-26

## 2024-04-26 RX ORDER — MAG HYDROX/ALUMINUM HYD/SIMETH 400-400-40
SUSPENSION, ORAL (FINAL DOSE FORM) ORAL
COMMUNITY

## 2024-04-26 NOTE — ASSESSMENT & PLAN NOTE
-Diabetes is above goal with A1c 9.4.  -Discussed dietary and exercise guidelines with patient and family.  -Discussed the importance of yearly eye exams.  -Discussed the importance of checking BG's regularly.    -Continue Lantus 30 units QD.   -Continue Metformin 1000 mg BID.  -Continue Glimiperide 4 mg BID.  -Start Jardiance 10 mg QD. Discussed the medication's MOA and that it may cause more frequent urination particularly upon starting the medication. Take one tablet in the morning with or without food. Encouraged to drink plenty of water as to not get dehydrated especially in warmer weather or with activity. Also discussed there may be an increased incidence of UTIs or yeast infections and to monitor for signs/symptoms.  -S/S hypoglycemia reviewed with Rule of 15's advised.  -Follow-up in 3 months.

## 2024-04-26 NOTE — PROGRESS NOTES
Patient Name: Hector Johnston Today's Date: 2024   Patient MRN / CSN: [id number] / [id number] Date of Encounter: 2024   Patient Age / : 74 y.o. / 1950 Encounter Provider: EDMOND Sr   Referring Physician: No ref. provider found          Hector is a 74 y.o. male who is being seen today for [facility] and Diabetes      History of Present Illness  He presents today to [facility].  He does have a significant history of type 2 diabetes that he is seeing endocrinology for today.  He also has a significant history of neuropathy and radicular symptoms which she is utilize low-dose gabapentin for this for some time.  He has been on 100 mg twice daily and feels like it is helping some but he thinks in increase strength would be more beneficial.  He would like to discuss this today.    He is also recently discharged the hospital after having a myocardial infarction.  He had a heart cath performed and 2 stents placed.  He states he is feeling much better since his procedure.      Allergies include:Patient has no known allergies.  Current Outpatient Medications   Medication Sig Dispense Refill    aspirin 81 MG EC tablet Take 1 tablet by mouth Every Morning.      atorvastatin (LIPITOR) 40 MG tablet Take 1 tablet by mouth Every Night. 30 tablet 0    brimonidine (ALPHAGAN P) 0.1 % solution ophthalmic solution Administer 1 drop into the left eye 2 (Two) Times a Day.      cholecalciferol (VITAMIN D3) 1.25 MG (42100 UT) capsule Take 1 capsule by mouth Every 7 (Seven) Days.      dorzolamide (TRUSOPT) 2 % ophthalmic solution Administer 1 drop into the left eye 3 (Three) Times a Day.      DULoxetine (CYMBALTA) 60 MG capsule Take 1 capsule by mouth Daily.      empagliflozin (Jardiance) 10 MG tablet tablet Take 1 tablet by mouth Daily. 30 tablet 3    fenofibrate micronized (LOFIBRA) 134 MG capsule Take 1 capsule by mouth Every Morning Before Breakfast.      glimepiride (AMARYL) 4 MG tablet Take 1  tablet by mouth 2 (Two) Times a Day. 180 tablet 3    glucose blood test strip Use to test blood sugar 3 times a day & as needed 100 each 5    Lantus SoloStar 100 UNIT/ML injection pen Inject 30 Units under the skin into the appropriate area as directed Daily.      latanoprost (XALATAN) 0.005 % ophthalmic solution Administer 1 drop into the left eye Every Night.      Loratadine (CLARITIN PO) Take 10 mg by mouth Daily.      metFORMIN (GLUCOPHAGE) 1000 MG tablet Take 1 tablet by mouth 2 (Two) Times a Day With Meals.      metoprolol tartrate (LOPRESSOR) 25 MG tablet Take 1 tablet by mouth Every 12 Hours. 60 tablet 2    Multiple Vitamins-Minerals (MULTIVITAMIN ADULT PO) Take 1 tablet by mouth Every Morning.      Saw Palmetto 450 MG capsule Take  by mouth.      ticagrelor (BRILINTA) 90 MG tablet tablet Take 1 tablet by mouth 2 (Two) Times a Day. 60 tablet 0    timolol (TIMOPTIC) 0.5 % ophthalmic solution Administer 1 drop to both eyes 2 (Two) Times a Day.      TRUEPLUS LANCETS 33G misc Use to test blood sugar 3 times a day & as needed 100 each 5    gabapentin (NEURONTIN) 300 MG capsule Take 1 capsule by mouth 3 (Three) Times a Day. 60 capsule 1     No current facility-administered medications for this visit.     Past Medical History:   Diagnosis Date    Bilateral pseudophakia 12/29/2020    Diabetes mellitus     Epiretinal membrane (ERM) of left eye 03/03/2021    Hernia, inguinal, left 04/21/2024    Hypertension     Primary open-angle glaucoma 12/29/2020    Vitreous prolapse, left eye 03/03/2021     Family History   Problem Relation Age of Onset    No Known Problems Father     No Known Problems Mother      Past Surgical History:   Procedure Laterality Date    CARDIAC CATHETERIZATION N/A 5/4/2018    Procedure: Left Heart Cath;  Surgeon: Jose Dickerson MD;  Location: [facility] INVASIVE LOCATION;  Service: Cardiovascular    CARDIAC CATHETERIZATION N/A 4/23/2024    Procedure: Left Heart Cath;  Surgeon: Gunjan Saleh MD;   "Location: [facility] INVASIVE LOCATION;  Service: Cardiology;  Laterality: N/A;    CHOLECYSTECTOMY      MOUTH SURGERY       Social History     Substance and Sexual Activity   Alcohol Use No     Social History     Tobacco Use   Smoking Status Never   Smokeless Tobacco Never     Social History     Substance and Sexual Activity   Drug Use No     Review of Systems   Constitutional: Negative.    HENT: Negative.     Respiratory: Negative.     Cardiovascular: Negative.    Gastrointestinal: Negative.    Genitourinary: Negative.    Musculoskeletal:  Positive for arthralgias and myalgias.        Depression Assessment Review:  PHQ-9 Total Score:    Vital Signs & Measurements Taken This Encounter  /64 (BP Location: Right arm, Patient Position: Sitting)   Pulse 83   Temp 97 °F (36.1 °C)   Ht 180.3 cm (70.98\")   Wt 90.4 kg (199 lb 3.2 oz)   SpO2 99%   BMI 27.80 kg/m²    SpO2 Percentage    04/26/24 1403   SpO2: 99%        Physical Exam  Constitutional:       Appearance: Normal appearance.   HENT:      Head: Normocephalic and atraumatic.      Right Ear: External ear normal.      Left Ear: External ear normal.      Mouth/Throat:      Mouth: Mucous membranes are moist.   Eyes:      Pupils: Pupils are equal, round, and reactive to light.   Cardiovascular:      Rate and Rhythm: Normal rate and regular rhythm.      Pulses: Normal pulses.      Heart sounds: Normal heart sounds.   Pulmonary:      Effort: Pulmonary effort is normal.      Breath sounds: Normal breath sounds.   Abdominal:      Palpations: Abdomen is soft.   Musculoskeletal:         General: Normal range of motion.   Skin:     General: Skin is warm and dry.   Neurological:      General: No focal deficit present.      Mental Status: He is alert and oriented to person, place, and time.   Psychiatric:         Mood and Affect: Mood normal.         Behavior: Behavior normal.          Fall Risk Assessment:  Fall Risk Assessment was completed, and patient is at low risk " for falls.    Assessment & Plan  Patient Active Problem List   Diagnosis    Hypogonadism in male    Corporo-venous occlusive erectile dysfunction    Abnormal cardiovascular stress test    Bilateral pseudophakia    Hernia, inguinal, left    Vitreous prolapse, left eye    Primary open-angle glaucoma    Epiretinal membrane (ERM) of left eye    NSTEMI (non-ST elevated myocardial infarction)    Type 2 diabetes mellitus with hyperglycemia, with long-term current use of insulin       ICD-10-CM ICD-9-CM   1. Lumbar radiculopathy, chronic  M54.16 724.4   2. Type 2 diabetes mellitus with hyperglycemia, with long-term current use of insulin  E11.65 250.00    Z79.4 790.29     V58.67     Orders Placed This Encounter   Procedures    Urine Drug Screen - Urine, Clean Catch     Standing Status:   Future     Number of Occurrences:   1     Standing Expiration Date:   4/26/2025     Order Specific Question:   Release to patient     Answer:   Routine Release [[id number]]    Fentanyl, Urine - Urine, Clean Catch     Order Specific Question:   Release to patient     Answer:   Routine Release [[id number]]       Meds Ordered During Visit:  New Medications Ordered This Visit   Medications    gabapentin (NEURONTIN) 300 MG capsule     Sig: Take 1 capsule by mouth 3 (Three) Times a Day.     Dispense:  60 capsule     Refill:  1     -- Please continue neuropathy from his diabetes as well as radicular symptoms From his low back.  I will increase his gabapentin to 300 mg twice daily.The patient has read and signed the [facility] Controlled Substance Contract.  I will continue to see patient for regular follow up appointments.  They are well controlled on their medication.  МАРИЯ is reviewed today. The patient is aware of the potential for addiction and dependence.  UDS performed today.    Return in about 4 weeks (around 5/24/2024).         This document has been electronically signed by EDMOND Sr  April 29, 2024 09:12 EDT    Petr  Carline, APRN  [address] [place] [address]  [phone] (office)    Part of this note may be an electronic transcription/translation of spoken language to printed text using the Dragon Dictation System.

## 2024-04-26 NOTE — PROGRESS NOTES
Specialty Pharmacy Patient Management Program  Endocrinology Initial Assessment       Hector Johnston Jr. is a 74 y.o. male with Type 2 Diabetes (diagnosed about 10 years ago) seen by an Endocrinology provider and enrolled in the Endocrinology Patient Management program offered by [facility].  An initial outreach was conducted, including assessment of therapy appropriateness and specialty medication education for Metformin, Glimepiride, and Insulin Therapy. The patient was introduced to services offered by [facility], including: regular assessments, refill coordination, curbside pick-up or mail order delivery options, prior authorization maintenance, and financial assistance programs as applicable. The patient was also provided with contact information for the pharmacy team.     Patient is currently taking Metformin 1000 mg BID, Glimepiride 4 mg BID, and Lantus 30 units QAM.     Patient checks BG every morning with readings 130-200. Patient denies low BG <70.   Patient reports this morning his BG was 302 (had fried pickles and onion rings last night).     Complications include: None reported    Patient denies personal/family history of thyroid cancer, denies history of pancreatitis, and denies issues with recurrent UTI/yeast infections.     Recently hospitalized (chief complaint chest pain) for NSTEMI.     In the past, the patient has tried:     Drug Dose Reason for Discontinuation Notes   Glipizide  Transitioned to Glimepiride                    Insurance Coverage & Financial Support  [employer] (Denies issues with affordability)     Relevant Past Medical History and Comorbidities  Relevant medical history and concomitant health conditions were discussed with the patient. The patient's chart has been reviewed for relevant past medical history and comorbid health conditions and updated as necessary.   Past Medical History:   Diagnosis Date    Bilateral  pseudophakia 12/29/2020    Diabetes mellitus     Epiretinal membrane (ERM) of left eye 03/03/2021    Hernia, inguinal, left 04/21/2024    Hypertension     Primary open-angle glaucoma 12/29/2020    Vitreous prolapse, left eye 03/03/2021     Social History     Socioeconomic History    Marital status:    Tobacco Use    Smoking status: Former    Smokeless tobacco: Never   Vaping Use    Vaping status: Never Used   Substance and Sexual Activity    Alcohol use: No    Drug use: No    Sexual activity: Yes     Partners: Female            Allergies  Known allergies and reactions were discussed with the patient. The patient's chart has been reviewed for  allergy information and updated as necessary.   No Known Allergies    Allergies reviewed by Amanda Campos RPH on 4/26/2024 at  9:35 AM  Allergies reviewed by Amanda Campos RPH on 4/26/2024 at 10:34 AM    Relevant Laboratory Values  A1C Last 3 Results          4/22/2024    02:23   HGBA1C Last 3 Results   Hemoglobin A1C 9.40      Lab Results   Component Value Date    HGBA1C 9.40 (H) 04/22/2024     Lab Results   Component Value Date    GLUCOSE 163 (H) 04/24/2024    CALCIUM 9.2 04/24/2024     04/24/2024    K 4.2 04/24/2024    CO2 22.8 04/24/2024     04/24/2024    BUN 18 04/24/2024    CREATININE 1.37 (H) 04/24/2024    EGFRIFNONA 55 (L) 02/11/2022    BCR 13.1 04/24/2024    ANIONGAP 10.2 04/24/2024     Lab Results   Component Value Date    CHOL 127 04/24/2024    TRIG 203 (H) 04/24/2024    HDL 28 (L) 04/24/2024    LDL 65 04/24/2024         Current Medication List  This medication list has been reviewed with the patient and evaluated for any interactions or necessary modifications/recommendations, and updated to include all prescription medications, OTC medications, and supplements the patient is currently taking.  This list reflects what is contained in the patient's profile, which has also been marked as reviewed to communicate to other providers it is the  most up to date version of the patient's current medication therapy.     Current Outpatient Medications:     aspirin 81 MG EC tablet, Take 1 tablet by mouth Every Morning., Disp: , Rfl:     atorvastatin (LIPITOR) 40 MG tablet, Take 1 tablet by mouth Every Night., Disp: 30 tablet, Rfl: 0    brimonidine (ALPHAGAN P) 0.1 % solution ophthalmic solution, Administer 1 drop into the left eye 2 (Two) Times a Day., Disp: , Rfl:     cholecalciferol (VITAMIN D3) 1.25 MG (37882 UT) capsule, Take 1 capsule by mouth Every 7 (Seven) Days., Disp: , Rfl:     dorzolamide (TRUSOPT) 2 % ophthalmic solution, Administer 1 drop into the left eye 3 (Three) Times a Day., Disp: , Rfl:     DULoxetine (CYMBALTA) 60 MG capsule, Take 1 capsule by mouth Daily., Disp: , Rfl:     fenofibrate micronized (LOFIBRA) 134 MG capsule, Take 1 capsule by mouth Every Morning Before Breakfast., Disp: , Rfl:     gabapentin (NEURONTIN) 100 MG capsule, Take 1 capsule by mouth 2 (Two) Times a Day., Disp: , Rfl:     glucose blood test strip, Use to test blood sugar 3 times a day & as needed, Disp: 100 each, Rfl: 5    Lantus SoloStar 100 UNIT/ML injection pen, Inject 30 Units under the skin into the appropriate area as directed Daily., Disp: , Rfl:     latanoprost (XALATAN) 0.005 % ophthalmic solution, Administer 1 drop into the left eye Every Night., Disp: , Rfl:     Loratadine (CLARITIN PO), Take 10 mg by mouth Daily., Disp: , Rfl:     metFORMIN (GLUCOPHAGE) 1000 MG tablet, Take 1 tablet by mouth 2 (Two) Times a Day With Meals., Disp: , Rfl:     metoprolol tartrate (LOPRESSOR) 25 MG tablet, Take 1 tablet by mouth Every 12 Hours., Disp: 60 tablet, Rfl: 2    Multiple Vitamins-Minerals (MULTIVITAMIN ADULT PO), Take 1 tablet by mouth Every Morning., Disp: , Rfl:     Saw Palmetto 450 MG capsule, Take  by mouth., Disp: , Rfl:     ticagrelor (BRILINTA) 90 MG tablet tablet, Take 1 tablet by mouth 2 (Two) Times a Day., Disp: 60 tablet, Rfl: 0    timolol (TIMOPTIC) 0.5 %  ophthalmic solution, Administer 1 drop to both eyes 2 (Two) Times a Day., Disp: , Rfl:     empagliflozin (Jardiance) 10 MG tablet tablet, Take 1 tablet by mouth Daily., Disp: 30 tablet, Rfl: 3    glimepiride (AMARYL) 4 MG tablet, Take 1 tablet by mouth 2 (Two) Times a Day., Disp: 180 tablet, Rfl: 3    TRUEPLUS LANCETS 33G misc, Use to test blood sugar 3 times a day & as needed, Disp: 100 each, Rfl: 5  No current facility-administered medications for this visit.    Medicines reviewed by Amanda Campos RP on 4/26/2024 at 10:34 AM    Drug Interactions  No significant drug-drug interactions with diabetes medications expected according to literature.    Recommended Medications Assessment  Aspirin -  Currently Taking   Statin -  Currently Taking   ACEi/ARB - Not Taking Currently (Valsartan was previously discontinued)    Current 10-Year ASCVD Risk: Cannot calculate since LDL is <70    Adherence and Self-Administration  Adherence related to the patient's specialty therapy was discussed with the patient. The Adherence segment of this outreach has been reviewed and updated.   Is there a concern with patient's ability to self administer the medication correctly and without issue?: No  Were any potential barriers to adherence identified during the initial assessment or patient education?: No  Are there any concerns regarding the patient's understanding of the importance of medication adherence?: No    Barriers to Patient Adherence and/or Self-Administration: None   Methods for Supporting Patient Adherence and/or Self-Administration: None-Required    Vaccination Status:   COVID 19: Yes; Primary Series  Influenza: Yes  Pneumococcal: Yes  Hep B: Unsure    Smoker? No; Former    Goals of Therapy  Goals related to the patient's specialty therapy were discussed with the patient. The Patient Goals segment of this outreach has been reviewed and updated.    Goals Addressed Today        HEMOGLOBIN A1C < 7      Specialty Pharmacy  General Goal      Minimize Hypoglycemia              Reassessment Plan & Follow-Up  Patient's diabetes is uncontrolled with A1C of 9.4%.  Medication Therapy Changes:  Per EDMOND Faye:  Start Jardiance 10 mg daily   Related Plans, Therapy Recommendations or Therapy Problems to Be Addressed:    Recommended appropriate vaccinations to patient - declines today   Patient denies issues with affordability or adherence at this time.    Patient does not wish to use [facility] at this time due to: loyalty to another pharmacy    Attestation  I attest the patient was actively involved in and has agreed to the above plan of care. If the prescribed therapy is at any point deemed not appropriate based on the current or future assessments, a consultation will be initiated with the patient's specialty care provider to determine the best course of action. The revised plan of therapy will be documented along with any required assessments and/or additional patient education.    Thank you,    Amanda Campos, PharmD  Community PGY1 Pharmacy Resident  [facility]  04/26/24  10:35 EDT

## 2024-04-26 NOTE — PROGRESS NOTES
Chief Complaint   Patient presents with    Diabetes        Referring Provider  Monday, RAMON Sy*     HPI   Hector Johnston Jr. is a 74 y.o. male had concerns including Diabetes.    Seen as a new patient.  T2DM.    Diabetes was diagnosed 2004.  Complications include heart attack 04/2024.  Last ophtho exam was 2023.  Current medications for diabetes include Lantus 30 units QD, Metformin 1000 mg BID, Glimiperide 4 mg BID.  Previous meds: Glipizide-switched to glimiperide, Jardiance-insurance issue  He checks his blood sugar 1 times per day.   Hypos:none    ACE/ARB:no, Statin: yes  Labs:  Lipid Panel:utd  ABDULAZIZ: utd    The following portions of the patient's history were reviewed and updated as appropriate: allergies, current medications, past family history, past medical history, past social history, past surgical history, and problem list.    Diet: he doesn't try to limit his diet.    Past Medical History:   Diagnosis Date    Bilateral pseudophakia 12/29/2020    Diabetes mellitus     Epiretinal membrane (ERM) of left eye 03/03/2021    Hernia, inguinal, left 04/21/2024    Hypertension     Primary open-angle glaucoma 12/29/2020    Vitreous prolapse, left eye 03/03/2021     Past Surgical History:   Procedure Laterality Date    CARDIAC CATHETERIZATION N/A 5/4/2018    Procedure: Left Heart Cath;  Surgeon: Jose Dickerson MD;  Location:  [facility];  Service: Cardiovascular    CARDIAC CATHETERIZATION N/A 4/23/2024    Procedure: Left Heart Cath;  Surgeon: Gunjan Saleh MD;  Location:  [facility];  Service: Cardiology;  Laterality: N/A;    CHOLECYSTECTOMY      MOUTH SURGERY        Family History   Problem Relation Age of Onset    No Known Problems Father     No Known Problems Mother       Social History     Socioeconomic History    Marital status:    Tobacco Use    Smoking status: Former    Smokeless tobacco: Never   Vaping Use    Vaping status: Never Used   Substance and  Sexual Activity    Alcohol use: No    Drug use: No    Sexual activity: Yes     Partners: Female      No Known Allergies   Current Outpatient Medications on File Prior to Visit   Medication Sig Dispense Refill    aspirin 81 MG EC tablet Take 1 tablet by mouth Every Morning.      atorvastatin (LIPITOR) 40 MG tablet Take 1 tablet by mouth Every Night. 30 tablet 0    brimonidine (ALPHAGAN P) 0.1 % solution ophthalmic solution Administer 1 drop into the left eye 2 (Two) Times a Day.      dorzolamide (TRUSOPT) 2 % ophthalmic solution Administer 1 drop into the left eye 3 (Three) Times a Day.      DULoxetine (CYMBALTA) 60 MG capsule Take 1 capsule by mouth Daily.      fenofibrate micronized (LOFIBRA) 134 MG capsule Take 1 capsule by mouth Every Morning Before Breakfast.      gabapentin (NEURONTIN) 100 MG capsule Take 1 capsule by mouth 2 (Two) Times a Day.      glimepiride (AMARYL) 4 MG tablet Take 1 tablet by mouth 2 (Two) Times a Day. 180 tablet 3    glucose blood test strip Use to test blood sugar 3 times a day & as needed 100 each 5    Lantus SoloStar 100 UNIT/ML injection pen Inject 30 Units under the skin into the appropriate area as directed Daily.      latanoprost (XALATAN) 0.005 % ophthalmic solution Administer 1 drop into the left eye Every Night.      metFORMIN (GLUCOPHAGE) 1000 MG tablet Take 1 tablet by mouth 2 (Two) Times a Day With Meals.      metoprolol tartrate (LOPRESSOR) 25 MG tablet Take 1 tablet by mouth Every 12 Hours. 60 tablet 2    Multiple Vitamins-Minerals (MULTIVITAMIN ADULT PO) Take 1 tablet by mouth Every Morning.      ticagrelor (BRILINTA) 90 MG tablet tablet Take 1 tablet by mouth 2 (Two) Times a Day. 60 tablet 0    timolol (TIMOPTIC) 0.5 % ophthalmic solution Administer 1 drop to both eyes 2 (Two) Times a Day.      TRUEPLUS LANCETS 33G misc Use to test blood sugar 3 times a day & as needed 100 each 5     No current facility-administered medications on file prior to visit.        Review of  "Systems   Constitutional:  Positive for fatigue. Negative for unexpected weight gain and unexpected weight loss.   Eyes:  Positive for blurred vision.   Endocrine: Positive for polydipsia, polyphagia and polyuria.   Psychiatric/Behavioral:  Negative for sleep disturbance.    All other systems reviewed and are negative.    /74 (BP Location: Left arm, Patient Position: Sitting, Cuff Size: Adult)   Pulse 92   Ht 180.3 cm (71\")   Wt 91.2 kg (201 lb)   SpO2 97%   BMI 28.03 kg/m²      Physical Exam  Vitals reviewed.   Constitutional:       Appearance: Normal appearance.   Eyes:      Extraocular Movements: Extraocular movements intact.   Cardiovascular:      Rate and Rhythm: Tachycardia present.      Pulses:           Dorsalis pedis pulses are 2+ on the right side and 2+ on the left side.        Posterior tibial pulses are 2+ on the right side and 2+ on the left side.   Pulmonary:      Effort: Pulmonary effort is normal.   Feet:      Right foot:      Protective Sensation: 10 sites tested.  10 sites sensed.      Skin integrity: Skin integrity normal.      Toenail Condition: Right toenails are abnormally thick and long.      Left foot:      Protective Sensation: 10 sites tested.  10 sites sensed.      Skin integrity: Skin integrity normal.      Toenail Condition: Left toenails are abnormally thick and long.      Comments: Diabetic Foot Exam Performed and Monofilament Test Performed  Neurological:      General: No focal deficit present.      Mental Status: He is alert and oriented to person, place, and time.   Psychiatric:         Mood and Affect: Mood normal.         Behavior: Behavior normal.         Thought Content: Thought content normal.         Judgment: Judgment normal.       CMP:  Lab Results   Component Value Date    BUN 18 04/24/2024    CREATININE 1.37 (H) 04/24/2024    EGFRIFNONA 55 (L) 02/11/2022    BCR 13.1 04/24/2024     04/24/2024    K 4.2 04/24/2024    CO2 22.8 04/24/2024    CALCIUM 9.2 " 04/24/2024    ALBUMIN 3.8 04/23/2024    BILITOT 0.3 04/23/2024    ALKPHOS 53 04/23/2024    AST 23 04/23/2024    ALT 22 04/23/2024     Lipid Panel:  Lab Results   Component Value Date    CHOL 127 04/24/2024    TRIG 203 (H) 04/24/2024    HDL 28 (L) 04/24/2024    VLDL 34 04/24/2024    LDL 65 04/24/2024     HbA1c:  Lab Results   Component Value Date    HGBA1C 9.40 (H) 04/22/2024    HGBA1C 8.40 (H) 05/04/2018     Glucose:  Lab Results   Component Value Date    POCGLU 232 (H) 04/24/2024     Microalbumin:  Lab Results   Component Value Date    MALBCRERATIO  10/15/2022      Comment:      Unable to calculate     TSH:  Lab Results   Component Value Date    TSH 1.800 04/21/2024       Assessment and Plan    Diagnoses and all orders for this visit:    1. Type 2 diabetes mellitus with hyperglycemia, with long-term current use of insulin (Primary)  Assessment & Plan:  -Diabetes is above goal with A1c 9.4.  -Discussed dietary and exercise guidelines with patient and family.  -Discussed the importance of yearly eye exams.  -Discussed the importance of checking BG's regularly.    -Continue Lantus 30 units QD.   -Continue Metformin 1000 mg BID.  -Continue Glimiperide 4 mg BID.  -Start Jardiance 10 mg QD. Discussed the medication's MOA and that it may cause more frequent urination particularly upon starting the medication. Take one tablet in the morning with or without food. Encouraged to drink plenty of water as to not get dehydrated especially in warmer weather or with activity. Also discussed there may be an increased incidence of UTIs or yeast infections and to monitor for signs/symptoms.  -S/S hypoglycemia reviewed with Rule of 15's advised.  -Follow-up in 3 months.    Orders:  -     Ambulatory Referral to Diabetic Education    Other orders  -     empagliflozin (Jardiance) 10 MG tablet tablet; Take 1 tablet by mouth Daily.  Dispense: 30 tablet; Refill: 3         Return in about 3 months (around 7/26/2024) for Follow-up  appointment, A1C. The patient was instructed to contact the clinic with any interval questions or concerns.        This document has been electronically signed by EDMOND Faye  April 26, 2024 10:10 EDT   Endocrinology    Please note that portions of this document were completed with a voice recognition program. Efforts were made to edit the dictations, but occasionally words are mis-transcribed.

## 2024-04-29 ENCOUNTER — OFFICE VISIT (OUTPATIENT)
Dept: CARDIOLOGY | Facility: CLINIC | Age: 74
End: 2024-04-29
Payer: MEDICARE

## 2024-04-29 ENCOUNTER — PATIENT ROUNDING (BHMG ONLY) (OUTPATIENT)
Dept: FAMILY MEDICINE CLINIC | Facility: CLINIC | Age: 74
End: 2024-04-29
Payer: MEDICARE

## 2024-04-29 VITALS
RESPIRATION RATE: 16 BRPM | OXYGEN SATURATION: 98 % | HEART RATE: 69 BPM | BODY MASS INDEX: 27.44 KG/M2 | DIASTOLIC BLOOD PRESSURE: 72 MMHG | SYSTOLIC BLOOD PRESSURE: 116 MMHG | HEIGHT: 71 IN | WEIGHT: 196 LBS

## 2024-04-29 DIAGNOSIS — N17.9 AKI (ACUTE KIDNEY INJURY): Primary | ICD-10-CM

## 2024-04-29 DIAGNOSIS — I25.10 ASCVD (ARTERIOSCLEROTIC CARDIOVASCULAR DISEASE): ICD-10-CM

## 2024-04-29 DIAGNOSIS — E78.5 DYSLIPIDEMIA: ICD-10-CM

## 2024-04-29 PROCEDURE — 99214 OFFICE O/P EST MOD 30 MIN: CPT | Performed by: NURSE PRACTITIONER

## 2024-04-29 PROCEDURE — 1159F MED LIST DOCD IN RCRD: CPT | Performed by: NURSE PRACTITIONER

## 2024-04-29 PROCEDURE — 1160F RVW MEDS BY RX/DR IN RCRD: CPT | Performed by: NURSE PRACTITIONER

## 2024-04-29 RX ORDER — ATORVASTATIN CALCIUM 40 MG/1
40 TABLET, FILM COATED ORAL NIGHTLY
Qty: 90 TABLET | Refills: 1 | Status: SHIPPED | OUTPATIENT
Start: 2024-04-29

## 2024-04-29 NOTE — PROGRESS NOTES
[facility]             YingEDMOND Sorensen Adam, APRN  Hector Johnston JrYajaira  1950 04/29/2024    Patient Active Problem List   Diagnosis    Hypogonadism in male    Corporo-venous occlusive erectile dysfunction    Abnormal cardiovascular stress test    Bilateral pseudophakia    Hernia, inguinal, left    Vitreous prolapse, left eye    Primary open-angle glaucoma    Epiretinal membrane (ERM) of left eye    NSTEMI (non-ST elevated myocardial infarction)    Type 2 diabetes mellitus with hyperglycemia, with long-term current use of insulin    ASCVD (arteriosclerotic cardiovascular disease)    Dyslipidemia       Dear Petr Crowley APRN:    Subjective     Chief Complaint   Patient presents with    Follow-up     Recent stents x2 s/p MI    Med Management     verbal       HPI:     This is a 74 y.o. male with known past medical history of diabetes mellitus type 2, coronary disease status post PCI to the LAD in April 2024, essential hypertension.    Hector Johnston Jr. presents today for hospital follow-up.  [facility].On workup in the ED HS troponin found to be 54 with repeat of 60.  He was given aspirin and admitted to the telemetry unit for further workup and management.  Daily ASA, statin continued.     TTE this admission did show normal LVEF at 61 to 65% with grade 1 diastolic dysfunction.  There was aortic valve sclerosis.  Cardiology was consulted for further assistance and recommendations-patient was further evaluated with CTA of the coronaries which showed a noncalcified high risk plaque in the proximal LAD with occlusion and distal reconstitution of flow.  There was also mild plaque in the left main and left circumflex arteries without significant stenosis.  Total calcium score was 349.  Given these results interventional cardiology was consulted and patient did undergo cardiac catheterization on 4/23/2024 and had  stent x 2 to the LAD.  ASA, high intensity statin continued and Brilinta added to regimen.  Patient has remained stable postprocedure without further reported chest pain.  Labs repeated and noted mild increase in creatinine to 1.37-had been 1.1-1.2 this admission.  Also of note patient's home losartan was held on admission and given BP trend overall fairly well-controlled and creatinine slightly above baseline we will continue to hold this medication on discharge.  Home atorvastatin dose increased to 40 mg daily.     Patient states he has been doing well since hospitalization.  Denies any chest pain or shortness of breath.  Has been taking medications as prescribed.    Diagnostic Testing  Echocardiogram 4/2024: EF 61 to 65%  Left heart catheterization 4/2024: PCI to the LAD x 2     All other systems were reviewed and were negative.    Patient Active Problem List   Diagnosis    Hypogonadism in male    Corporo-venous occlusive erectile dysfunction    Abnormal cardiovascular stress test    Bilateral pseudophakia    Hernia, inguinal, left    Vitreous prolapse, left eye    Primary open-angle glaucoma    Epiretinal membrane (ERM) of left eye    NSTEMI (non-ST elevated myocardial infarction)    Type 2 diabetes mellitus with hyperglycemia, with long-term current use of insulin    ASCVD (arteriosclerotic cardiovascular disease)    Dyslipidemia       family history includes No Known Problems in his father and mother.     reports that he has never smoked. He has never used smokeless tobacco. He reports that he does not drink alcohol and does not use drugs.    No Known Allergies      Current Outpatient Medications:     aspirin 81 MG EC tablet, Take 1 tablet by mouth Every Morning., Disp: , Rfl:     atorvastatin (LIPITOR) 40 MG tablet, Take 1 tablet by mouth Every Night., Disp: 90 tablet, Rfl: 1    brimonidine (ALPHAGAN P) 0.1 % solution ophthalmic solution, Administer 1 drop into the left eye 2 (Two) Times a Day., Disp: , Rfl:      cholecalciferol (VITAMIN D3) 1.25 MG (19352 UT) capsule, Take 1 capsule by mouth Every 7 (Seven) Days., Disp: , Rfl:     dorzolamide (TRUSOPT) 2 % ophthalmic solution, Administer 1 drop into the left eye 3 (Three) Times a Day., Disp: , Rfl:     DULoxetine (CYMBALTA) 60 MG capsule, Take 1 capsule by mouth Daily., Disp: , Rfl:     empagliflozin (Jardiance) 10 MG tablet tablet, Take 1 tablet by mouth Daily., Disp: 30 tablet, Rfl: 3    fenofibrate micronized (LOFIBRA) 134 MG capsule, Take 1 capsule by mouth Every Morning Before Breakfast., Disp: , Rfl:     gabapentin (NEURONTIN) 300 MG capsule, Take 1 capsule by mouth 3 (Three) Times a Day., Disp: 60 capsule, Rfl: 1    glimepiride (AMARYL) 4 MG tablet, Take 1 tablet by mouth 2 (Two) Times a Day., Disp: 180 tablet, Rfl: 3    glucose blood test strip, Use to test blood sugar 3 times a day & as needed, Disp: 100 each, Rfl: 5    Lantus SoloStar 100 UNIT/ML injection pen, Inject 30 Units under the skin into the appropriate area as directed Daily., Disp: , Rfl:     latanoprost (XALATAN) 0.005 % ophthalmic solution, Administer 1 drop into the left eye Every Night., Disp: , Rfl:     Loratadine (CLARITIN PO), Take 10 mg by mouth Daily., Disp: , Rfl:     metFORMIN (GLUCOPHAGE) 1000 MG tablet, Take 1 tablet by mouth 2 (Two) Times a Day With Meals., Disp: , Rfl:     metoprolol tartrate (LOPRESSOR) 25 MG tablet, Take 1 tablet by mouth Every 12 Hours., Disp: 180 tablet, Rfl: 2    Multiple Vitamins-Minerals (MULTIVITAMIN ADULT PO), Take 1 tablet by mouth Every Morning., Disp: , Rfl:     Saw Palmetto 450 MG capsule, Take  by mouth., Disp: , Rfl:     ticagrelor (BRILINTA) 90 MG tablet tablet, Take 1 tablet by mouth 2 (Two) Times a Day., Disp: 180 tablet, Rfl: 3    timolol (TIMOPTIC) 0.5 % ophthalmic solution, Administer 1 drop to both eyes 2 (Two) Times a Day., Disp: , Rfl:     TRUEPLUS LANCETS 33G misc, Use to test blood sugar 3 times a day & as needed, Disp: 100 each, Rfl:  5      Physical Exam:  I have reviewed the patient's current vital signs as documented in the patient's EMR.   Vitals:    04/29/24 1027   BP: 116/72   Pulse: 69   Resp: 16   SpO2: 98%     Body mass index is 27.34 kg/m².       04/29/24  1027   Weight: 88.9 kg (196 lb)      Physical Exam  Constitutional:       General: He is not in acute distress.     Appearance: Normal appearance. He is well-developed.   HENT:      Head: Normocephalic and atraumatic.      Mouth/Throat:      Mouth: Mucous membranes are moist.   Eyes:      Extraocular Movements: Extraocular movements intact.      Pupils: Pupils are equal, round, and reactive to light.   Neck:      Vascular: No JVD.   Cardiovascular:      Rate and Rhythm: Normal rate and regular rhythm.      Heart sounds: Normal heart sounds. No murmur heard.     No S3 or S4 sounds.   Pulmonary:      Effort: Pulmonary effort is normal. No respiratory distress.      Breath sounds: Normal breath sounds. No wheezing.   Abdominal:      General: Bowel sounds are normal. There is no distension.      Palpations: Abdomen is soft. There is no hepatomegaly.      Tenderness: There is no abdominal tenderness.   Musculoskeletal:         General: Normal range of motion.      Cervical back: Normal range of motion and neck supple.   Skin:     General: Skin is warm and dry.      Coloration: Skin is not jaundiced or pale.   Neurological:      General: No focal deficit present.      Mental Status: He is alert and oriented to person, place, and time. Mental status is at baseline.   Psychiatric:         Mood and Affect: Mood normal.         Behavior: Behavior normal.         Thought Content: Thought content normal.         Judgment: Judgment normal.            DATA REVIEWED:     TTE/EMMA:  Results for orders placed during the hospital encounter of 04/21/24    Adult Transthoracic Echo Complete W/ Cont if Necessary Per Protocol    Interpretation Summary    Normal left ventricular cavity size and wall thickness  noted. All left ventricular wall segments contract normally.    Left ventricular ejection fraction appears to be 61 - 65%.    Left ventricular diastolic function is consistent with (grade I) impaired relaxation.    The aortic valve is abnormal in structure. The aortic valve exhibits sclerosis. The aortic valve appears trileaflet. Mild aortic valve regurgitation is present. No aortic valve stenosis is present.    The mitral valve is structurally normal with no regurgitation or significant stenosis present.    There is no evidence of pericardial effusion. .      Laboratory evaluations:    Lab Results   Component Value Date    GLUCOSE 163 (H) 04/24/2024    BUN 18 04/24/2024    CREATININE 1.37 (H) 04/24/2024    EGFRIFNONA 55 (L) 02/11/2022    BCR 13.1 04/24/2024    K 4.2 04/24/2024    CO2 22.8 04/24/2024    CALCIUM 9.2 04/24/2024    ALBUMIN 3.8 04/23/2024    AST 23 04/23/2024    ALT 22 04/23/2024     Lab Results   Component Value Date    WBC 7.67 04/24/2024    HGB 13.2 04/24/2024    HCT 40.7 04/24/2024    MCV 89.8 04/24/2024     04/24/2024     Lab Results   Component Value Date    CHOL 127 04/24/2024    TRIG 203 (H) 04/24/2024    HDL 28 (L) 04/24/2024    LDL 65 04/24/2024     Lab Results   Component Value Date    TSH 1.800 04/21/2024     Lab Results   Component Value Date    HGBA1C 9.40 (H) 04/22/2024     Lab Results   Component Value Date    ALT 22 04/23/2024     Lab Results   Component Value Date    HGBA1C 9.40 (H) 04/22/2024    HGBA1C 8.40 (H) 05/04/2018     Lab Results   Component Value Date    MICROALBUR <1.2 10/15/2022    CREATININE 1.37 (H) 04/24/2024     Lab Results   Component Value Date    FERRITIN 290.40 01/17/2021     Lab Results   Component Value Date    INR 0.86 (L) 04/21/2024    PROTIME 12.2 04/21/2024           --------------------------------------------------------------------------------------------------------------------------    ASSESSMENT/PLAN:      Diagnosis Plan   1. CYRUS (acute kidney  injury)  Basic Metabolic Panel      2. ASCVD (arteriosclerotic cardiovascular disease)        3. Dyslipidemia            ASCVD  CYRUS  Recently underwent PCI x 2 to the LAD.  Denies any chest pain.  We discussed the importance of taking aspirin and Brilinta x 1 year with aspirin indefinitely.  Continue statin, Jardiance and metoprolol.  Had some mild CYRUS on recent labs.  Follow-up BMP in 1 week    Dyslipidemia  Recent lab work showed LDL at goal.  Continue statin.      This document has been @Electronically signed by EDMOND Carlos, 04/29/24, 8:32 AM EDT.       Dictated Utilizing Dragon Dictation: Part of this note may be an electronic transcription/translation of spoken language to printed text using the Dragon Dictation System.    Follow-up appointment and medication changes provided in hand delivered After Visit Summary as well as reviewed in the room.

## 2024-04-30 ENCOUNTER — READMISSION MANAGEMENT (OUTPATIENT)
Dept: CALL CENTER | Facility: HOSPITAL | Age: 74
End: 2024-04-30
Payer: MEDICARE

## 2024-04-30 NOTE — OUTREACH NOTE
AMI Week 1 Survey      Flowsheet Row Responses   [facility] patient discharged from? [facility]   Does the patient have one of the following disease processes/diagnoses(primary or secondary)? Acute MI (STEMI,NSTEMI)   Week 1 attempt successful? Yes   Call start time 1601   Call end time 1612   Discharge diagnosis NSTEMI, heart cath & stent x 2   Is patient permission given to speak with other caregiver? Yes   List who call center can speak with wife   Person spoke with today (if not patient) and relationship wife   Meds reviewed with patient/caregiver? Yes   Is the patient having any side effects they believe may be caused by any medication additions or changes? No   Does the patient have all prescriptions related to this admission filled (includes statins,anticoagulants,HTN meds,anti-arrhythmia meds) Yes   Is the patient taking all medications as directed (includes completed medication regime)? Yes   Does the patient have a primary care provider?  Yes   Does the patient have an appointment with their PCP,cardiologist,or clinic within 7 days of discharge? Yes   Has the patient kept scheduled appointments due by today? Yes   Comments Per wife the pt has been monitoring gluc at home, range has been, 150-200 which is his baseline. Pt does not follow any specific diet, per wife but they want a referal to [facility], contact info to hosp for outpt services provided and RN suggested wife contacting PCP office for referral, wife v/u. Wife denies any c/o chest pain, dizziness or SOA from pt since DC. [facility] site is [facility] per wife's report.   Did the patient receive a copy of their discharge instructions? Yes   Nursing interventions Reviewed instructions with patient   What is the patient's perception of their health status since discharge? Improving   Nursing interventions Nurse provided patient education   Is the patient/caregiver able to teach back signs and symptoms of when to call for help immediately:  Sudden chest discomfort, Sudden discomfort in arms, back, neck or jaw, Shortness of breath at any time, Dizziness or lightheadedness, Nausea or vomiting   Nursing interventions Nurse provided patient education   Is the patient/caregiver able to teach back lifestyle changes to help prevent MIs Regular exercise as approved by provider   Is the patient/caregiver able to teach back ways to prevent a second heart attack: Take medications, Follow up with MD   If the patient is a current smoker, are they able to teach back resources for cessation? Not a smoker   Is the patient/caregiver able to teach back the hierarchy of who to call/visit for symptoms/problems? PCP, Specialist, Home health nurse, Urgent Care, ED, 911 Yes   Week 1 call completed? Yes   Revoked No further contact(revokes)-requires comment   Call end time 1612            Deedee HAMEED - Registered Nurse

## 2024-05-05 ENCOUNTER — LAB (OUTPATIENT)
Dept: LAB | Facility: HOSPITAL | Age: 74
End: 2024-05-05
Payer: MEDICARE

## 2024-05-05 DIAGNOSIS — N17.9 AKI (ACUTE KIDNEY INJURY): ICD-10-CM

## 2024-05-05 LAB
ANION GAP SERPL CALCULATED.3IONS-SCNC: 11 MMOL/L (ref 5–15)
BUN SERPL-MCNC: 23 MG/DL (ref 8–23)
BUN/CREAT SERPL: 16.2 (ref 7–25)
CALCIUM SPEC-SCNC: 9.4 MG/DL (ref 8.6–10.5)
CHLORIDE SERPL-SCNC: 105 MMOL/L (ref 98–107)
CO2 SERPL-SCNC: 22 MMOL/L (ref 22–29)
CREAT SERPL-MCNC: 1.42 MG/DL (ref 0.76–1.27)
EGFRCR SERPLBLD CKD-EPI 2021: 52.2 ML/MIN/1.73
GLUCOSE SERPL-MCNC: 218 MG/DL (ref 65–99)
POTASSIUM SERPL-SCNC: 5.3 MMOL/L (ref 3.5–5.2)
SODIUM SERPL-SCNC: 138 MMOL/L (ref 136–145)

## 2024-05-05 PROCEDURE — 80048 BASIC METABOLIC PNL TOTAL CA: CPT

## 2024-05-05 PROCEDURE — 36415 COLL VENOUS BLD VENIPUNCTURE: CPT

## 2024-05-06 DIAGNOSIS — N17.9 AKI (ACUTE KIDNEY INJURY): Primary | ICD-10-CM

## 2024-05-12 ENCOUNTER — LAB (OUTPATIENT)
Dept: LAB | Facility: HOSPITAL | Age: 74
End: 2024-05-12
Payer: MEDICARE

## 2024-05-12 DIAGNOSIS — N17.9 AKI (ACUTE KIDNEY INJURY): ICD-10-CM

## 2024-05-12 LAB
ANION GAP SERPL CALCULATED.3IONS-SCNC: 8.6 MMOL/L (ref 5–15)
BUN SERPL-MCNC: 21 MG/DL (ref 8–23)
BUN/CREAT SERPL: 15.8 (ref 7–25)
CALCIUM SPEC-SCNC: 9.9 MG/DL (ref 8.6–10.5)
CHLORIDE SERPL-SCNC: 105 MMOL/L (ref 98–107)
CO2 SERPL-SCNC: 24.4 MMOL/L (ref 22–29)
CREAT SERPL-MCNC: 1.33 MG/DL (ref 0.76–1.27)
EGFRCR SERPLBLD CKD-EPI 2021: 56.4 ML/MIN/1.73
GLUCOSE SERPL-MCNC: 120 MG/DL (ref 65–99)
POTASSIUM SERPL-SCNC: 5.3 MMOL/L (ref 3.5–5.2)
SODIUM SERPL-SCNC: 138 MMOL/L (ref 136–145)

## 2024-05-12 PROCEDURE — 36415 COLL VENOUS BLD VENIPUNCTURE: CPT

## 2024-05-12 PROCEDURE — 80048 BASIC METABOLIC PNL TOTAL CA: CPT

## 2024-05-13 ENCOUNTER — TELEPHONE (OUTPATIENT)
Dept: CARDIOLOGY | Facility: CLINIC | Age: 74
End: 2024-05-13
Payer: MEDICARE

## 2024-05-13 NOTE — TELEPHONE ENCOUNTER
Patient sates he has been on a weight restriction due to stent placement ad hospital d/c and wants to know when thus will be lifted

## 2024-05-15 DIAGNOSIS — N17.9 AKI (ACUTE KIDNEY INJURY): Primary | ICD-10-CM

## 2024-05-15 NOTE — TELEPHONE ENCOUNTER
If he is not having any more symptoms he can return back to normal activities.  Although out exercise caution when restarting ADLs.

## 2024-05-22 ENCOUNTER — TRANSCRIBE ORDERS (OUTPATIENT)
Dept: ADMINISTRATIVE | Facility: HOSPITAL | Age: 74
End: 2024-05-22
Payer: MEDICARE

## 2024-05-22 ENCOUNTER — LAB (OUTPATIENT)
Dept: LAB | Facility: HOSPITAL | Age: 74
End: 2024-05-22
Payer: MEDICARE

## 2024-05-22 DIAGNOSIS — N18.9 CHRONIC KIDNEY DISEASE, UNSPECIFIED CKD STAGE: Primary | ICD-10-CM

## 2024-05-22 DIAGNOSIS — N18.9 CHRONIC KIDNEY DISEASE, UNSPECIFIED CKD STAGE: ICD-10-CM

## 2024-05-22 LAB
ALBUMIN SERPL-MCNC: 4.4 G/DL (ref 3.5–5.2)
ALBUMIN UR-MCNC: <1.2 MG/DL
ALBUMIN/GLOB SERPL: 1.7 G/DL
ALP SERPL-CCNC: 52 U/L (ref 39–117)
ALT SERPL W P-5'-P-CCNC: 25 U/L (ref 1–41)
ANION GAP SERPL CALCULATED.3IONS-SCNC: 9.9 MMOL/L (ref 5–15)
AST SERPL-CCNC: 26 U/L (ref 1–40)
BACTERIA UR QL AUTO: NORMAL /HPF
BILIRUB SERPL-MCNC: 0.4 MG/DL (ref 0–1.2)
BILIRUB UR QL STRIP: NEGATIVE
BUN SERPL-MCNC: 22 MG/DL (ref 8–23)
BUN/CREAT SERPL: 15.5 (ref 7–25)
CALCIUM SPEC-SCNC: 9.9 MG/DL (ref 8.6–10.5)
CHLORIDE SERPL-SCNC: 106 MMOL/L (ref 98–107)
CLARITY UR: CLEAR
CO2 SERPL-SCNC: 21.1 MMOL/L (ref 22–29)
COLOR UR: YELLOW
CREAT SERPL-MCNC: 1.42 MG/DL (ref 0.76–1.27)
CREAT UR-MCNC: 57.8 MG/DL
CREAT UR-MCNC: 57.8 MG/DL
EGFRCR SERPLBLD CKD-EPI 2021: 52.2 ML/MIN/1.73
GLOBULIN UR ELPH-MCNC: 2.6 GM/DL
GLUCOSE SERPL-MCNC: 137 MG/DL (ref 65–99)
GLUCOSE UR STRIP-MCNC: ABNORMAL MG/DL
HGB UR QL STRIP.AUTO: NEGATIVE
HYALINE CASTS UR QL AUTO: NORMAL /LPF
KETONES UR QL STRIP: NEGATIVE
LEUKOCYTE ESTERASE UR QL STRIP.AUTO: NEGATIVE
MICROALBUMIN/CREAT UR: NORMAL MG/G{CREAT}
NITRITE UR QL STRIP: NEGATIVE
PH UR STRIP.AUTO: 6 [PH] (ref 5–8)
POTASSIUM SERPL-SCNC: 4.9 MMOL/L (ref 3.5–5.2)
PROT ?TM UR-MCNC: 4.8 MG/DL
PROT SERPL-MCNC: 7 G/DL (ref 6–8.5)
PROT UR QL STRIP: NEGATIVE
PROT/CREAT UR: 83 MG/G CREA (ref 0–200)
RBC # UR STRIP: NORMAL /HPF
REF LAB TEST METHOD: NORMAL
SODIUM SERPL-SCNC: 137 MMOL/L (ref 136–145)
SP GR UR STRIP: 1.02 (ref 1–1.03)
SQUAMOUS #/AREA URNS HPF: NORMAL /HPF
UROBILINOGEN UR QL STRIP: ABNORMAL
WBC # UR STRIP: NORMAL /HPF

## 2024-05-22 PROCEDURE — 80053 COMPREHEN METABOLIC PANEL: CPT

## 2024-05-22 PROCEDURE — 82570 ASSAY OF URINE CREATININE: CPT

## 2024-05-22 PROCEDURE — 82043 UR ALBUMIN QUANTITATIVE: CPT

## 2024-05-22 PROCEDURE — 81001 URINALYSIS AUTO W/SCOPE: CPT

## 2024-05-22 PROCEDURE — 84156 ASSAY OF PROTEIN URINE: CPT

## 2024-05-22 PROCEDURE — 36415 COLL VENOUS BLD VENIPUNCTURE: CPT

## 2024-05-24 ENCOUNTER — OFFICE VISIT (OUTPATIENT)
Dept: FAMILY MEDICINE CLINIC | Facility: CLINIC | Age: 74
End: 2024-05-24
Payer: MEDICARE

## 2024-05-24 VITALS
DIASTOLIC BLOOD PRESSURE: 70 MMHG | BODY MASS INDEX: 27.62 KG/M2 | HEART RATE: 56 BPM | WEIGHT: 198 LBS | SYSTOLIC BLOOD PRESSURE: 100 MMHG | OXYGEN SATURATION: 96 % | TEMPERATURE: 97.9 F

## 2024-05-24 DIAGNOSIS — Z00.00 MEDICARE ANNUAL WELLNESS VISIT, SUBSEQUENT: Primary | ICD-10-CM

## 2024-05-24 NOTE — PROGRESS NOTES
The ABCs of the Annual Wellness Visit  Subsequent Medicare Wellness Visit    Subjective    Hector Johnston Jr. is a 74 y.o. male who presents for a Subsequent Medicare Wellness Visit.    The following portions of the patient's history were reviewed and   updated as appropriate: allergies, current medications, past family history, past medical history, past social history, past surgical history, and problem list.    Compared to one year ago, the patient feels his physical   health is the same.    Compared to one year ago, the patient feels his mental   health is the same.    Recent Hospitalizations:  This patient has had a [facility] admission record on file within the last 365 days.    Current Medical Providers:  Patient Care Team:  Petr Crowley APRN as PCP - General (Nurse Practitioner)    Outpatient Medications Prior to Visit   Medication Sig Dispense Refill    aspirin 81 MG EC tablet Take 1 tablet by mouth Every Morning.      atorvastatin (LIPITOR) 40 MG tablet Take 1 tablet by mouth Every Night. 90 tablet 1    brimonidine (ALPHAGAN P) 0.1 % solution ophthalmic solution Administer 1 drop into the left eye 2 (Two) Times a Day.      cholecalciferol (VITAMIN D3) 1.25 MG (35154 UT) capsule Take 1 capsule by mouth Every 7 (Seven) Days.      dorzolamide (TRUSOPT) 2 % ophthalmic solution Administer 1 drop into the left eye 3 (Three) Times a Day.      DULoxetine (CYMBALTA) 60 MG capsule Take 1 capsule by mouth Daily.      empagliflozin (Jardiance) 10 MG tablet tablet Take 1 tablet by mouth Daily. 30 tablet 3    fenofibrate micronized (LOFIBRA) 134 MG capsule Take 1 capsule by mouth Every Morning Before Breakfast.      gabapentin (NEURONTIN) 300 MG capsule Take 1 capsule by mouth 3 (Three) Times a Day. 60 capsule 1    glimepiride (AMARYL) 4 MG tablet Take 1 tablet by mouth 2 (Two) Times a Day. 180 tablet 3    glucose blood test strip Use to test blood sugar 3 times a day & as needed 100 each 5    Angelika Zhang  100 UNIT/ML injection pen Inject 30 Units under the skin into the appropriate area as directed Daily.      latanoprost (XALATAN) 0.005 % ophthalmic solution Administer 1 drop into the left eye Every Night.      Loratadine (CLARITIN PO) Take 10 mg by mouth Daily.      metFORMIN (GLUCOPHAGE) 1000 MG tablet Take 1 tablet by mouth 2 (Two) Times a Day With Meals.      metoprolol tartrate (LOPRESSOR) 25 MG tablet Take 1 tablet by mouth Every 12 Hours. 180 tablet 2    Multiple Vitamins-Minerals (MULTIVITAMIN ADULT PO) Take 1 tablet by mouth Every Morning.      Saw Palmetto 450 MG capsule Take  by mouth.      ticagrelor (BRILINTA) 90 MG tablet tablet Take 1 tablet by mouth 2 (Two) Times a Day. 180 tablet 3    timolol (TIMOPTIC) 0.5 % ophthalmic solution Administer 1 drop to both eyes 2 (Two) Times a Day.      TRUEPLUS LANCETS 33G misc Use to test blood sugar 3 times a day & as needed 100 each 5     No facility-administered medications prior to visit.       No opioid medication identified on active medication list. I have reviewed chart for other potential  high risk medication/s and harmful drug interactions in the elderly.        Aspirin is on active medication list. Aspirin use is indicated based on review of current medical condition/s. Pros and cons of this therapy have been discussed today. Benefits of this medication outweigh potential harm.  Patient has been encouraged to continue taking this medication.  .      Patient Active Problem List   Diagnosis    Hypogonadism in male    Corporo-venous occlusive erectile dysfunction    Abnormal cardiovascular stress test    Bilateral pseudophakia    Hernia, inguinal, left    Vitreous prolapse, left eye    Primary open-angle glaucoma    Epiretinal membrane (ERM) of left eye    NSTEMI (non-ST elevated myocardial infarction)    Type 2 diabetes mellitus with hyperglycemia, with long-term current use of insulin    ASCVD (arteriosclerotic cardiovascular disease)    Dyslipidemia  "    Advance Care Planning   Advance Care Planning     Advance Directive is not on file.  ACP discussion was held with the patient during this visit. Patient does not have an advance directive, information provided.     Objective    Vitals:    24 0845   BP: 100/70   BP Location: Left arm   Patient Position: Sitting   Cuff Size: Adult   Pulse: 56   Temp: 97.9 °F (36.6 °C)   TempSrc: Temporal   SpO2: 96%   Weight: 89.8 kg (198 lb)   PainSc: 0-No pain     Estimated body mass index is 27.62 kg/m² as calculated from the following:    Height as of 24: 180.3 cm (71\").    Weight as of this encounter: 89.8 kg (198 lb).           Does the patient have evidence of cognitive impairment? No    Lab Results   Component Value Date    TRIG 203 (H) 2024    HDL 28 (L) 2024    LDL 65 2024    VLDL 34 2024    HGBA1C 9.40 (H) 2024        HEALTH RISK ASSESSMENT    Smoking Status:  Social History     Tobacco Use   Smoking Status Never   Smokeless Tobacco Never     Alcohol Consumption:  Social History     Substance and Sexual Activity   Alcohol Use No     Fall Risk Screen:    STEADI Fall Risk Assessment was completed, and patient is at LOW risk for falls.Assessment completed on:2024    Depression Screenin/24/2024     8:52 AM   PHQ-2/PHQ-9 Depression Screening   Little Interest or Pleasure in Doing Things 0-->not at all   Feeling Down, Depressed or Hopeless 0-->not at all   PHQ-9: Brief Depression Severity Measure Score 0       Health Habits and Functional and Cognitive Screenin/24/2024     8:52 AM   Functional & Cognitive Status   Do you have difficulty preparing food and eating? No   Do you have difficulty bathing yourself, getting dressed or grooming yourself? No   Do you have difficulty using the toilet? No   Do you have difficulty moving around from place to place? No   Do you have trouble with steps or getting out of a bed or a chair? No   Current Diet Well Balanced Diet "   Dental Exam Up to date   Eye Exam Up to date   Exercise (times per week) 7 times per week   Current Exercises Include Other   Do you need help using the phone?  No   Are you deaf or do you have serious difficulty hearing?  Yes   Do you need help to go to places out of walking distance? No   Do you need help shopping? No   Do you need help preparing meals?  No   Do you need help with housework?  No   Do you need help with laundry? No   Do you need help taking your medications? No   Do you need help managing money? No   Do you ever drive or ride in a car without wearing a seat belt? No   Have you felt unusual stress, anger or loneliness in the last month? No   Who do you live with? Spouse   If you need help, do you have trouble finding someone available to you? No   Have you been bothered in the last four weeks by sexual problems? No   Do you have difficulty concentrating, remembering or making decisions? No       Age-appropriate Screening Schedule:  Refer to the list below for future screening recommendations based on patient's age, sex and/or medical conditions. Orders for these recommended tests are listed in the plan section. The patient has been provided with a written plan.    Health Maintenance   Topic Date Due    RSV Vaccine - Adults (1 - 1-dose 60+ series) Never done    COLORECTAL CANCER SCREENING  08/30/2021    ANNUAL WELLNESS VISIT  08/04/2023    COVID-19 Vaccine (3 - 2023-24 season) 09/01/2023    DIABETIC EYE EXAM  03/02/2024    INFLUENZA VACCINE  08/01/2024    HEMOGLOBIN A1C  10/22/2024    BMI FOLLOWUP  12/06/2024    DIABETIC FOOT EXAM  04/26/2025    URINE MICROALBUMIN  05/22/2025    TDAP/TD VACCINES (2 - Td or Tdap) 06/03/2029    HEPATITIS C SCREENING  Completed    Pneumococcal Vaccine 65+  Completed    ZOSTER VACCINE  Completed                  CMS Preventative Services Quick Reference  Risk Factors Identified During Encounter  Glaucoma or Family History of Glaucoma:   audiologist seen. Hearing aids  recommended but patient has not gotten them yet.   Hearing Problem:  Patient sees audiologist.   Immunizations Discussed/Encouraged: RSV (Respiratory Syncytial Virus)  Dental Screening Recommended  The above risks/problems have been discussed with the patient.  Pertinent information has been shared with the patient in the After Visit Summary.  An After Visit Summary and PPPS were made available to the patient.    --He presents today for annual wellness exam.  He states all in all he is doing well.  We discussed screenings including colorectal cancer screening as well as immunizations.  He verbalized understanding as he states his Cologuard should be good through next year.  We will repeat that and then.  He is not interested in colonoscopy at this time.        Follow Up:   Next Medicare Wellness visit to be scheduled in 1 year.     He presents today for annual wellness exam.  He states all in all he is doing well.  He presents today for med

## 2024-06-25 ENCOUNTER — EDUCATION (OUTPATIENT)
Dept: DIABETES SERVICES | Facility: HOSPITAL | Age: 74
End: 2024-06-25
Payer: MEDICARE

## 2024-06-25 NOTE — CONSULTS
Diabetes Education  Assessment/Teaching    Patient Name:  Hector Johnston Jr.  YOB: 1950  MRN: [id number]  Admit Date:  (Not on file)      Assessment Date:  6/25/2024      Flowsheet Row Most Recent Value   DM Education Needs    Meter Has own   Frequency of Testing 2 times a day   Medication Insulin, Oral   Problem Solving Hypoglycemia, Hyperglycemia, Sick days, Signs, Symptoms, Treatment   Reducing Risks Retinopathy, Neuropathy, Cardiovascular, Immunizations, Dental exam, Foot care, Eye exam, Blood pressure, Lipids, A1C testing   Healthy Eating Basic meal plan provided   Physical Activity Walking   Physical Activity Frequency Regularly   Healthy Coping Appropriate   Discharge Plan Follow-up with PCP   Motivation Moderate   Teaching Method Explanation, Discussion, Handouts   Patient Response Verbalized understanding              Other Comments:  Patient is a 74 year old male with a history of diabetes, HTN and Glaucoma.   Healthy eating:  Patient states he will decrease carb intake and will try to eat healthier. Patient states he uses the plate method and has been limiting his carb's and sweets better.   Being active:   Patient states he will walk 1 hour a day  minutes five times a week and increase the times in two weeks if he can tolerate.   Monitoring:   Patient states that he is taking his blood glucose two times a day.   Medication:  Patient will take medications as prescribed. Patient was able to list his medications and the dose as prescribed.   Lantus 30 units QD.    Metformin 1000 mg BID.   Glimepiride 4 mg BID.               Jardiance 10 mg QD  Problem solving:  Patient states the signs and symptoms of hypo/hyperglycemia and the treatment for both.   Reducing risk:  Patient states he checks his feet nightly and does not walk without something to protect his feet. Patient states he  goes to his follow up appointments and goes to the eye doctor and dentist when needed and on annual visits.    Healthy coping:  Patient has a good support team at home and can talk to his wife for help when needed.   Patient received a call and was educated on diet, activity, checking blood glucose, taking medication as prescribed, checking feet daily and S/S of hypo/hyperglycemia. Patient was educated on sick rule days. A packet of information will be mailed, that includes ADA handouts, AADE 7 handout, fast food book and grocery shopping book for help when shopping along with office contact number for questions. .  Patient had no questions or concerns. Thank you.        Electronically signed by:  Jodi Catherine RN  06/25/24 13:29 EDT

## 2024-06-29 DIAGNOSIS — M54.16 LUMBAR RADICULOPATHY, CHRONIC: ICD-10-CM

## 2024-07-01 RX ORDER — GABAPENTIN 300 MG/1
300 CAPSULE ORAL 3 TIMES DAILY
Qty: 60 CAPSULE | Refills: 1 | Status: SHIPPED | OUTPATIENT
Start: 2024-07-01

## 2024-07-05 RX ORDER — PEN NEEDLE, DIABETIC 30 GX5/16"
1 NEEDLE, DISPOSABLE MISCELLANEOUS DAILY
COMMUNITY
End: 2024-07-05 | Stop reason: SDUPTHER

## 2024-07-05 RX ORDER — PEN NEEDLE, DIABETIC 30 GX5/16"
1 NEEDLE, DISPOSABLE MISCELLANEOUS DAILY
Qty: 100 EACH | Refills: 3 | Status: SHIPPED | OUTPATIENT
Start: 2024-07-05

## 2024-07-05 NOTE — TELEPHONE ENCOUNTER
Caller: Beba Johnston    Relationship: Emergency Contact    Best call back number: [phone]     Requested Prescriptions:   NEEDLES FOR LANTUS INJECTIONS   (PEN NEEDLES)        Pharmacy where request should be sent:[facility] [id number]  [place], [place] - [address] [facility] [address] - [phone]  - [phone] FX       Last office visit with prescribing clinician: 5/24/2024   Last telemedicine visit with prescribing clinician: Visit date not found   Next office visit with prescribing clinician: 8/23/2024     Additional details provided by patient: PATIENT OUT OF NEEDLES     Does the patient have less than a 3 day supply:  [x] Yes  [] No    Would you like a call back once the refill request has been completed: [x] Yes [] No    If the office needs to give you a call back, can they leave a voicemail: [x] Yes [] No    Brissa Fisher Rep   07/05/24 10:59 EDT

## 2024-07-29 ENCOUNTER — OFFICE VISIT (OUTPATIENT)
Dept: ENDOCRINOLOGY | Facility: CLINIC | Age: 74
End: 2024-07-29
Payer: MEDICARE

## 2024-07-29 VITALS
HEART RATE: 71 BPM | BODY MASS INDEX: 27.64 KG/M2 | DIASTOLIC BLOOD PRESSURE: 74 MMHG | SYSTOLIC BLOOD PRESSURE: 123 MMHG | OXYGEN SATURATION: 95 % | WEIGHT: 197.4 LBS | HEIGHT: 71 IN

## 2024-07-29 DIAGNOSIS — Z79.4 TYPE 2 DIABETES MELLITUS WITH HYPERGLYCEMIA, WITH LONG-TERM CURRENT USE OF INSULIN: Primary | ICD-10-CM

## 2024-07-29 DIAGNOSIS — E11.65 TYPE 2 DIABETES MELLITUS WITH HYPERGLYCEMIA, WITH LONG-TERM CURRENT USE OF INSULIN: Primary | ICD-10-CM

## 2024-07-29 LAB
EXPIRATION DATE: ABNORMAL
GLUCOSE BLDC GLUCOMTR-MCNC: 286 MG/DL (ref 70–130)
HBA1C MFR BLD: 8 % (ref 4.5–5.7)
Lab: ABNORMAL

## 2024-07-29 PROCEDURE — 1160F RVW MEDS BY RX/DR IN RCRD: CPT | Performed by: NURSE PRACTITIONER

## 2024-07-29 PROCEDURE — 99214 OFFICE O/P EST MOD 30 MIN: CPT | Performed by: NURSE PRACTITIONER

## 2024-07-29 PROCEDURE — 3052F HG A1C>EQUAL 8.0%<EQUAL 9.0%: CPT | Performed by: NURSE PRACTITIONER

## 2024-07-29 PROCEDURE — 1159F MED LIST DOCD IN RCRD: CPT | Performed by: NURSE PRACTITIONER

## 2024-07-29 PROCEDURE — 82947 ASSAY GLUCOSE BLOOD QUANT: CPT | Performed by: NURSE PRACTITIONER

## 2024-07-29 PROCEDURE — 83036 HEMOGLOBIN GLYCOSYLATED A1C: CPT | Performed by: NURSE PRACTITIONER

## 2024-07-29 NOTE — ASSESSMENT & PLAN NOTE
-Diabetes is improving with A1c down from 9.4 to 8.0.  -Discussed dietary and exercise guidelines with patient.  -Discussed the importance of yearly eye exams.  -Discussed the importance of checking BG's regularly.    -Continue Lantus 30 units QD.   -Continue Metformin 1000 mg BID.  -Continue Glimiperide 4 mg BID.  -Increase Jardiance 25 mg QD. Discussed the medication's MOA and that it may cause more frequent urination particularly upon starting the medication. Take one tablet in the morning with or without food. Encouraged to drink plenty of water as to not get dehydrated especially in warmer weather or with activity. Also discussed there may be an increased incidence of UTIs or yeast infections and to monitor for signs/symptoms.  -S/S hypoglycemia reviewed with Rule of 15's advised.  -Follow-up in 3 months.

## 2024-07-29 NOTE — PROGRESS NOTES
Chief Complaint   Patient presents with    Type 2 diabetes mellitus with hyperglycemia, with long-term        Referring Provider  Petr Crowley APRN HPI   Hector Johnston Jr. is a 74 y.o. male had concerns including Type 2 diabetes mellitus with hyperglycemia, with long-term.    T2DM.    Diabetes was diagnosed 2004.  Complications include heart attack 04/2024.  Last ophtho exam was 2023.  Current medications for diabetes include Lantus 30 units QD, Metformin 1000 mg BID, Glimiperide 4 mg BID, Jardiance 10 mg QD.  Previous meds: Glipizide-switched to glimiperide, Jardiance-insurance issue  He checks his blood sugar 1 times per day.   Hypos:none    ACE/ARB:no, Statin: yes  Labs:  Lipid Panel:utd  ABDULAZIZ: utd    The following portions of the patient's history were reviewed and updated as appropriate: allergies, current medications, past family history, past medical history, past social history, past surgical history, and problem list.    Diet: he doesn't try to limit his diet.    Past Medical History:   Diagnosis Date    Bilateral pseudophakia 12/29/2020    Diabetes mellitus     Epiretinal membrane (ERM) of left eye 03/03/2021    Hernia, inguinal, left 04/21/2024    UNKNOWN    Hypertension     Myocardial infarction     Primary open-angle glaucoma 12/29/2020    Vitreous prolapse, left eye 03/03/2021     Past Surgical History:   Procedure Laterality Date    CARDIAC CATHETERIZATION N/A 05/04/2018    Procedure: Left Heart Cath;  Surgeon: Jose Dickerson MD;  Location:  [facility];  Service: Cardiovascular    CARDIAC CATHETERIZATION N/A 04/23/2024    Procedure: Left Heart Cath;  Surgeon: Gunjan Saleh MD;  Location:  [facility];  Service: Cardiology;  Laterality: N/A;    CHOLECYSTECTOMY      CORONARY ANGIOPLASTY WITH STENT PLACEMENT      MOUTH SURGERY        Family History   Problem Relation Age of Onset    No Known Problems Father     No Known Problems Mother       Social History  "    Socioeconomic History    Marital status:    Tobacco Use    Smoking status: Never    Smokeless tobacco: Never   Vaping Use    Vaping status: Never Used   Substance and Sexual Activity    Alcohol use: No    Drug use: No    Sexual activity: Yes     Partners: Female      No Known Allergies   Current Outpatient Medications on File Prior to Visit   Medication Sig Dispense Refill    aspirin 81 MG EC tablet Take 1 tablet by mouth Every Morning.      atorvastatin (LIPITOR) 40 MG tablet Take 1 tablet by mouth Every Night. 90 tablet 1    brimonidine (ALPHAGAN P) 0.1 % solution ophthalmic solution Administer 1 drop into the left eye 2 (Two) Times a Day.      cholecalciferol (VITAMIN D3) 1.25 MG (67749 UT) capsule Take 1 capsule by mouth Every 7 (Seven) Days.      dorzolamide (TRUSOPT) 2 % ophthalmic solution Administer 1 drop into the left eye 3 (Three) Times a Day.      DULoxetine (CYMBALTA) 60 MG capsule Take 1 capsule by mouth Daily.      fenofibrate micronized (LOFIBRA) 134 MG capsule Take 1 capsule by mouth Every Morning Before Breakfast.      gabapentin (NEURONTIN) 300 MG capsule TAKE 1 CAPSULE BY MOUTH 3 TIMES A DAY 60 capsule 1    glimepiride (AMARYL) 4 MG tablet Take 1 tablet by mouth 2 (Two) Times a Day. 180 tablet 3    glucose blood test strip Use to test blood sugar 3 times a day & as needed 100 each 5    Insulin Pen Needle (Pen Needles 3/16\") 31G X 5 MM misc Use 1 each Daily. 100 each 3    Lantus SoloStar 100 UNIT/ML injection pen Inject 30 Units under the skin into the appropriate area as directed Daily.      latanoprost (XALATAN) 0.005 % ophthalmic solution Administer 1 drop into the left eye Every Night.      Loratadine (CLARITIN PO) Take 10 mg by mouth Daily.      metFORMIN (GLUCOPHAGE) 1000 MG tablet Take 1 tablet by mouth 2 (Two) Times a Day With Meals.      metoprolol tartrate (LOPRESSOR) 25 MG tablet Take 1 tablet by mouth Every 12 Hours. 180 tablet 2    Multiple Vitamins-Minerals (MULTIVITAMIN " "ADULT PO) Take 1 tablet by mouth Every Morning.      Saw Palmetto 450 MG capsule Take  by mouth.      ticagrelor (BRILINTA) 90 MG tablet tablet Take 1 tablet by mouth 2 (Two) Times a Day. 180 tablet 3    timolol (TIMOPTIC) 0.5 % ophthalmic solution Administer 1 drop to both eyes 2 (Two) Times a Day.      TRUEPLUS LANCETS 33G misc Use to test blood sugar 3 times a day & as needed 100 each 5    [DISCONTINUED] empagliflozin (Jardiance) 10 MG tablet tablet Take 1 tablet by mouth Daily. 30 tablet 3     No current facility-administered medications on file prior to visit.        Review of Systems   Constitutional:  Positive for fatigue. Negative for unexpected weight gain and unexpected weight loss.   Eyes:  Positive for blurred vision.   Endocrine: Positive for polydipsia, polyphagia and polyuria.   Psychiatric/Behavioral:  Negative for sleep disturbance.    All other systems reviewed and are negative.    /74 (BP Location: Right arm, Patient Position: Sitting, Cuff Size: Adult)   Pulse 71   Ht 180.3 cm (71\")   Wt 89.5 kg (197 lb 6.4 oz)   SpO2 95%   BMI 27.53 kg/m²      Physical Exam  Vitals reviewed.   Constitutional:       Appearance: Normal appearance.   Eyes:      Extraocular Movements: Extraocular movements intact.   Cardiovascular:      Rate and Rhythm: Normal rate.   Pulmonary:      Effort: Pulmonary effort is normal.   Neurological:      General: No focal deficit present.      Mental Status: He is alert and oriented to person, place, and time.   Psychiatric:         Mood and Affect: Mood normal.         Behavior: Behavior normal.         Thought Content: Thought content normal.         Judgment: Judgment normal.       CMP:  Lab Results   Component Value Date    BUN 22 05/22/2024    CREATININE 1.42 (H) 05/22/2024    EGFRIFNONA 55 (L) 02/11/2022    BCR 15.5 05/22/2024     05/22/2024    K 4.9 05/22/2024    CO2 21.1 (L) 05/22/2024    CALCIUM 9.9 05/22/2024    ALBUMIN 4.4 05/22/2024    BILITOT 0.4 " 05/22/2024    ALKPHOS 52 05/22/2024    AST 26 05/22/2024    ALT 25 05/22/2024     Lipid Panel:  Lab Results   Component Value Date    CHOL 127 04/24/2024    TRIG 203 (H) 04/24/2024    HDL 28 (L) 04/24/2024    VLDL 34 04/24/2024    LDL 65 04/24/2024     HbA1c:  Lab Results   Component Value Date    HGBA1C 8.0 (A) 07/29/2024    HGBA1C 9.40 (H) 04/22/2024     Glucose:  Lab Results   Component Value Date    POCGLU 286 (A) 07/29/2024     Microalbumin:  Lab Results   Component Value Date    MALBCRERATIO  05/22/2024      Comment:      Unable to calculate     TSH:  Lab Results   Component Value Date    TSH 1.800 04/21/2024       Assessment and Plan    Diagnoses and all orders for this visit:    1. Type 2 diabetes mellitus with hyperglycemia, with long-term current use of insulin (Primary)  Assessment & Plan:  -Diabetes is improving with A1c down from 9.4 to 8.0.  -Discussed dietary and exercise guidelines with patient.  -Discussed the importance of yearly eye exams.  -Discussed the importance of checking BG's regularly.    -Continue Lantus 30 units QD.   -Continue Metformin 1000 mg BID.  -Continue Glimiperide 4 mg BID.  -Increase Jardiance 25 mg QD. Discussed the medication's MOA and that it may cause more frequent urination particularly upon starting the medication. Take one tablet in the morning with or without food. Encouraged to drink plenty of water as to not get dehydrated especially in warmer weather or with activity. Also discussed there may be an increased incidence of UTIs or yeast infections and to monitor for signs/symptoms.  -S/S hypoglycemia reviewed with Rule of 15's advised.  -Follow-up in 3 months.    Orders:  -     POC Glycosylated Hemoglobin (Hb A1C)  -     POC Glucose, Blood    Other orders  -     empagliflozin (Jardiance) 25 MG tablet tablet; Take 1 tablet by mouth Daily.  Dispense: 30 tablet; Refill: 6         Return in about 3 months (around 10/29/2024) for Follow-up appointment, A1C. The patient was  instructed to contact the clinic with any interval questions or concerns.        This document has been electronically signed by EDMOND Faye  July 29, 2024 09:45 EDT   Endocrinology    Please note that portions of this document were completed with a voice recognition program. Efforts were made to edit the dictations, but occasionally words are mis-transcribed.

## 2024-08-23 ENCOUNTER — OFFICE VISIT (OUTPATIENT)
Dept: FAMILY MEDICINE CLINIC | Facility: CLINIC | Age: 74
End: 2024-08-23
Payer: MEDICARE

## 2024-08-23 VITALS
HEART RATE: 76 BPM | HEIGHT: 71 IN | BODY MASS INDEX: 27.35 KG/M2 | TEMPERATURE: 98.2 F | WEIGHT: 195.4 LBS | DIASTOLIC BLOOD PRESSURE: 62 MMHG | OXYGEN SATURATION: 96 % | SYSTOLIC BLOOD PRESSURE: 120 MMHG

## 2024-08-23 DIAGNOSIS — M54.16 LUMBAR RADICULOPATHY, CHRONIC: ICD-10-CM

## 2024-08-23 DIAGNOSIS — E11.40 TYPE 2 DIABETES MELLITUS WITH DIABETIC NEUROPATHY, WITH LONG-TERM CURRENT USE OF INSULIN: Primary | ICD-10-CM

## 2024-08-23 DIAGNOSIS — Z79.4 TYPE 2 DIABETES MELLITUS WITH DIABETIC NEUROPATHY, WITH LONG-TERM CURRENT USE OF INSULIN: Primary | ICD-10-CM

## 2024-08-23 PROCEDURE — 99213 OFFICE O/P EST LOW 20 MIN: CPT | Performed by: NURSE PRACTITIONER

## 2024-08-23 PROCEDURE — 3052F HG A1C>EQUAL 8.0%<EQUAL 9.0%: CPT | Performed by: NURSE PRACTITIONER

## 2024-08-23 PROCEDURE — 1160F RVW MEDS BY RX/DR IN RCRD: CPT | Performed by: NURSE PRACTITIONER

## 2024-08-23 PROCEDURE — 1159F MED LIST DOCD IN RCRD: CPT | Performed by: NURSE PRACTITIONER

## 2024-08-23 PROCEDURE — 1126F AMNT PAIN NOTED NONE PRSNT: CPT | Performed by: NURSE PRACTITIONER

## 2024-08-23 RX ORDER — GABAPENTIN 300 MG/1
300 CAPSULE ORAL 2 TIMES DAILY
Qty: 60 CAPSULE | Refills: 2 | Status: SHIPPED | OUTPATIENT
Start: 2024-08-23

## 2024-08-23 RX ORDER — GLUCOSAM/CHON-MSM1/C/MANG/BOSW 500-416.6
TABLET ORAL
Qty: 100 EACH | Refills: 5 | Status: SHIPPED | OUTPATIENT
Start: 2024-08-23

## 2024-08-23 NOTE — PROGRESS NOTES
Patient Name: Hector Johnston Today's Date: 2024   Patient MRN / CSN: [id number] / [id number] Date of Encounter: 2024   Patient Age / : 74 y.o. / 1950 Encounter Provider: EDMOND Sr   Referring Physician: No ref. provider found          Hector is a 74 y.o. male who is being seen today for Follow-up (3 month follow up. )      History of Present Illness  He presents today for follow-up  evaluation.  He is interested in getting a new pair diabetic shoes.  He would like an order for that today.  He also needs refills on lancets as well as his gabapentin that he uses for diabetic neuropathy.  He also states he would like to see podiatry as he has not seen them in a while and is having some numbness and callus issues on his feet.      Allergies include:Patient has no known allergies.  Current Outpatient Medications   Medication Sig Dispense Refill    aspirin 81 MG EC tablet Take 1 tablet by mouth Every Morning.      atorvastatin (LIPITOR) 40 MG tablet Take 1 tablet by mouth Every Night. 90 tablet 1    brimonidine (ALPHAGAN P) 0.1 % solution ophthalmic solution Administer 1 drop into the left eye 2 (Two) Times a Day.      cholecalciferol (VITAMIN D3) 1.25 MG (14205 UT) capsule Take 1 capsule by mouth Every 7 (Seven) Days.      dorzolamide (TRUSOPT) 2 % ophthalmic solution Administer 1 drop into the left eye 3 (Three) Times a Day.      DULoxetine (CYMBALTA) 60 MG capsule Take 1 capsule by mouth Daily.      empagliflozin (Jardiance) 25 MG tablet tablet Take 1 tablet by mouth Daily. 30 tablet 6    fenofibrate micronized (LOFIBRA) 134 MG capsule Take 1 capsule by mouth Every Morning Before Breakfast.      gabapentin (NEURONTIN) 300 MG capsule Take 1 capsule by mouth 2 (Two) Times a Day. 60 capsule 2    glimepiride (AMARYL) 4 MG tablet Take 1 tablet by mouth 2 (Two) Times a Day. 180 tablet 3    glucose blood test strip Use to test blood sugar 3 times a day & as needed 100 each 5    Insulin Pen  "Needle (Pen Needles 3/16\") 31G X 5 MM misc Use 1 each Daily. 100 each 3    Lantus SoloStar 100 UNIT/ML injection pen Inject 30 Units under the skin into the appropriate area as directed Daily.      latanoprost (XALATAN) 0.005 % ophthalmic solution Administer 1 drop into the left eye Every Night.      Loratadine (CLARITIN PO) Take 10 mg by mouth Daily.      metFORMIN (GLUCOPHAGE) 1000 MG tablet Take 1 tablet by mouth 2 (Two) Times a Day With Meals.      metoprolol tartrate (LOPRESSOR) 25 MG tablet Take 1 tablet by mouth Every 12 Hours. 180 tablet 2    Multiple Vitamins-Minerals (MULTIVITAMIN ADULT PO) Take 1 tablet by mouth Every Morning.      Saw Palmetto 450 MG capsule Take  by mouth.      ticagrelor (BRILINTA) 90 MG tablet tablet Take 1 tablet by mouth 2 (Two) Times a Day. 180 tablet 3    timolol (TIMOPTIC) 0.5 % ophthalmic solution Administer 1 drop to both eyes 2 (Two) Times a Day.      TRUEplus Lancets 33G misc Use to test blood sugar 3 times a day & as needed 100 each 5     No current facility-administered medications for this visit.     Past Medical History:   Diagnosis Date    Bilateral pseudophakia 12/29/2020    Diabetes mellitus     Epiretinal membrane (ERM) of left eye 03/03/2021    Hernia, inguinal, left 04/21/2024    UNKNOWN    Hypertension     Myocardial infarction     Primary open-angle glaucoma 12/29/2020    Vitreous prolapse, left eye 03/03/2021     Family History   Problem Relation Age of Onset    No Known Problems Father     No Known Problems Mother      Past Surgical History:   Procedure Laterality Date    CARDIAC CATHETERIZATION N/A 05/04/2018    Procedure: Left Heart Cath;  Surgeon: Jose Dickerson MD;  Location:  [facility];  Service: Cardiovascular    CARDIAC CATHETERIZATION N/A 04/23/2024    Procedure: Left Heart Cath;  Surgeon: Gunjan Saleh MD;  Location:  [facility];  Service: Cardiology;  Laterality: N/A;    CHOLECYSTECTOMY      CORONARY ANGIOPLASTY " "WITH STENT PLACEMENT      MOUTH SURGERY       Social History     Substance and Sexual Activity   Alcohol Use No     Social History     Tobacco Use   Smoking Status Never   Smokeless Tobacco Never     Social History     Substance and Sexual Activity   Drug Use No     Review of Systems   HENT:  Negative for congestion.    Respiratory:  Negative for shortness of breath.    Cardiovascular:  Negative for chest pain.   Gastrointestinal:  Negative for abdominal pain.   Genitourinary:  Negative for difficulty urinating.   Musculoskeletal:  Negative for arthralgias and myalgias.   Skin:  Negative for color change.   Neurological:  Positive for numbness.        Depression Assessment Review:  PHQ-9 Total Score:    Vital Signs & Measurements Taken This Encounter  /62 (BP Location: Left arm, Patient Position: Sitting, Cuff Size: Adult)   Pulse 76   Temp 98.2 °F (36.8 °C) (Oral)   Ht 180.3 cm (70.98\")   Wt 88.6 kg (195 lb 6.4 oz)   SpO2 96%   BMI 27.27 kg/m²    SpO2 Percentage    08/23/24 1318   SpO2: 96%               Physical Exam  Constitutional:       Appearance: Normal appearance.   HENT:      Right Ear: External ear normal.      Left Ear: External ear normal.      Nose: Nose normal.      Mouth/Throat:      Mouth: Mucous membranes are moist.   Eyes:      Pupils: Pupils are equal, round, and reactive to light.   Cardiovascular:      Rate and Rhythm: Normal rate and regular rhythm.      Pulses: Normal pulses.      Heart sounds: Normal heart sounds.   Pulmonary:      Effort: Pulmonary effort is normal.      Breath sounds: Normal breath sounds.   Abdominal:      Palpations: Abdomen is soft.   Musculoskeletal:         General: Normal range of motion.   Skin:     General: Skin is warm.   Neurological:      General: No focal deficit present.      Mental Status: He is alert.   Psychiatric:         Mood and Affect: Mood normal.         Behavior: Behavior normal.          Fall Risk Assessment:  Fall Risk Assessment was " completed, and patient is at moderate risk for falls.    Assessment & Plan  Patient Active Problem List   Diagnosis    Hypogonadism in male    Corporo-venous occlusive erectile dysfunction    Abnormal cardiovascular stress test    Bilateral pseudophakia    Hernia, inguinal, left    Vitreous prolapse, left eye    Primary open-angle glaucoma    Epiretinal membrane (ERM) of left eye    NSTEMI (non-ST elevated myocardial infarction)    Type 2 diabetes mellitus with hyperglycemia, with long-term current use of insulin    ASCVD (arteriosclerotic cardiovascular disease)    Dyslipidemia       ICD-10-CM ICD-9-CM   1. Type 2 diabetes mellitus with diabetic neuropathy, with long-term current use of insulin  E11.40 250.60    Z79.4 357.2     V58.67   2. Lumbar radiculopathy, chronic  M54.16 724.4     Orders Placed This Encounter   Procedures    Miscellaneous DME     Order Specific Question:   Type of DME     Answer:   diabetic shoes     Order Specific Question:   Length of Need     Answer:   12 Months    Ambulatory Referral to Podiatry     Referral Priority:   Routine     Referral Type:   Consultation     Referral Reason:   Specialty Services Required     Requested Specialty:   Podiatry     Number of Visits Requested:   1       Meds Ordered During Visit:  New Medications Ordered This Visit   Medications    TRUEplus Lancets 33G misc     Sig: Use to test blood sugar 3 times a day & as needed     Dispense:  100 each     Refill:  5    gabapentin (NEURONTIN) 300 MG capsule     Sig: Take 1 capsule by mouth 2 (Two) Times a Day.     Dispense:  60 capsule     Refill:  2     -- I will refill his lancets today.  I will also refer to podiatry for his diabetic neuropathy issues.  I will order a pair diabetic shoes for him he is using in the past with benefit.  I will also refill his gabapentin since he does get benefit from it for neuropathy.The patient has read and signed the Psychiatric Controlled Substance Contract.  I will continue to  see patient for regular follow up appointments.  They are well controlled on their medication.  PDMP reviewed today. The patient is aware of the potential for addiction and dependence.     Return in about 3 months (around 11/23/2024).           This document has been electronically signed by EDMOND Sr  August 23, 2024 15:17 EDT    EDMOND Sr  [address] [place] [address]  [phone] (office)    Part of this note may be an electronic transcription/translation of spoken language to printed text using the Dragon Dictation System.

## 2024-08-27 ENCOUNTER — TELEPHONE (OUTPATIENT)
Age: 74
End: 2024-08-27
Payer: MEDICARE

## 2024-08-27 ENCOUNTER — TELEPHONE (OUTPATIENT)
Dept: FAMILY MEDICINE CLINIC | Facility: CLINIC | Age: 74
End: 2024-08-27
Payer: MEDICARE

## 2024-08-27 NOTE — TELEPHONE ENCOUNTER
Caller: Hector Johnston Jr.    Relationship: Self    Best call back number: [phone]     Which medication are you concerned about: VERA    Who prescribed you this medication: MATT WORTHY    When did you start taking this medication: SINCE 5/24    What are your concerns: PRICE IS TOO HIGH.

## 2024-08-27 NOTE — TELEPHONE ENCOUNTER
Called pt's pharmacy to see what the price was for it there. Spoke with Griffin $450 for a 90 day supply. Pt is in the donut hole.     Called pt and advised him that he can come by the office and sign the application. He stated he does not get off work in-time to come by and wanted to know if it could be switched to something else that wouldn't be so expensive.   This encounter was created in error - please disregard.

## 2024-08-27 NOTE — TELEPHONE ENCOUNTER
Called pt's pharmacy to see what the price was for it there. Spoke with Griffin $450 for a 90 day supply. Pt is in the donut hole.     Called pt and advised him that he can come by the office and sign the application. He stated he does not get off work in-time to come by and wanted to know if it could be switched to something else that wouldn't be so expensive.

## 2024-08-27 NOTE — TELEPHONE ENCOUNTER
It looks like you all prescribe this to him. Is there alternatives or financial assistance for this?

## 2024-08-28 RX ORDER — CLOPIDOGREL 300 MG/1
600 TABLET, FILM COATED ORAL ONCE
Qty: 2 TABLET | Refills: 0 | Status: SHIPPED | OUTPATIENT
Start: 2024-08-29 | End: 2024-08-29

## 2024-08-28 RX ORDER — CLOPIDOGREL BISULFATE 75 MG/1
75 TABLET ORAL DAILY
Qty: 30 TABLET | Refills: 6 | Status: SHIPPED | OUTPATIENT
Start: 2024-08-30

## 2024-09-05 NOTE — TELEPHONE ENCOUNTER
Caller: Hector Johnston Jr.    Relationship: Self    Best call back number: [phone]     Requested Prescriptions:   Requested Prescriptions     Pending Prescriptions Disp Refills    DULoxetine (CYMBALTA) 60 MG capsule       Sig: Take 1 capsule by mouth Daily.        Pharmacy where request should be sent: [facility] [id number] [place] [id number] [facility] [address] -[phone] [facility] [phone] FX     Last office visit with prescribing clinician: 8/23/2024   Last telemedicine visit with prescribing clinician: Visit date not found   Next office visit with prescribing clinician: 11/22/2024     Additional details provided by patient: PATIENT HAS CALLED REQUESTING A REFILL ON ABOVE MEDICATION.    Does the patient have less than a 3 day supply:  [x] Yes  [] No    Would you like a call back once the refill request has been completed: [] Yes [x] No    If the office needs to give you a call back, can they leave a voicemail: [] Yes [x] No    Carol Reeves   09/05/24 14:11 EDT

## 2024-09-06 RX ORDER — DULOXETIN HYDROCHLORIDE 60 MG/1
60 CAPSULE, DELAYED RELEASE ORAL DAILY
Qty: 30 CAPSULE | Refills: 2 | Status: SHIPPED | OUTPATIENT
Start: 2024-09-06

## 2024-09-21 ENCOUNTER — TRANSCRIBE ORDERS (OUTPATIENT)
Dept: LAB | Facility: HOSPITAL | Age: 74
End: 2024-09-21
Payer: MEDICARE

## 2024-09-21 ENCOUNTER — LAB (OUTPATIENT)
Dept: LAB | Facility: HOSPITAL | Age: 74
End: 2024-09-21
Payer: MEDICARE

## 2024-09-21 DIAGNOSIS — N18.9 CHRONIC KIDNEY DISEASE, UNSPECIFIED CKD STAGE: ICD-10-CM

## 2024-09-21 DIAGNOSIS — N18.9 CHRONIC KIDNEY DISEASE, UNSPECIFIED CKD STAGE: Primary | ICD-10-CM

## 2024-09-21 LAB
ALBUMIN SERPL-MCNC: 4.3 G/DL (ref 3.5–5.2)
ALBUMIN UR-MCNC: <1.2 MG/DL
ALBUMIN/GLOB SERPL: 1.6 G/DL
ALP SERPL-CCNC: 60 U/L (ref 39–117)
ALT SERPL W P-5'-P-CCNC: 20 U/L (ref 1–41)
ANION GAP SERPL CALCULATED.3IONS-SCNC: 12 MMOL/L (ref 5–15)
AST SERPL-CCNC: 20 U/L (ref 1–40)
BACTERIA UR QL AUTO: NORMAL /HPF
BILIRUB SERPL-MCNC: 0.4 MG/DL (ref 0–1.2)
BILIRUB UR QL STRIP: NEGATIVE
BUN SERPL-MCNC: 23 MG/DL (ref 8–23)
BUN/CREAT SERPL: 15.5 (ref 7–25)
CALCIUM SPEC-SCNC: 9.6 MG/DL (ref 8.6–10.5)
CHLORIDE SERPL-SCNC: 102 MMOL/L (ref 98–107)
CLARITY UR: CLEAR
CO2 SERPL-SCNC: 23 MMOL/L (ref 22–29)
COLOR UR: YELLOW
CREAT SERPL-MCNC: 1.48 MG/DL (ref 0.76–1.27)
CREAT UR-MCNC: 52.8 MG/DL
EGFRCR SERPLBLD CKD-EPI 2021: 49.6 ML/MIN/1.73
GLOBULIN UR ELPH-MCNC: 2.7 GM/DL
GLUCOSE SERPL-MCNC: 214 MG/DL (ref 65–99)
GLUCOSE UR STRIP-MCNC: ABNORMAL MG/DL
HGB UR QL STRIP.AUTO: NEGATIVE
HOLD SPECIMEN: NORMAL
HYALINE CASTS UR QL AUTO: NORMAL /LPF
KETONES UR QL STRIP: NEGATIVE
LEUKOCYTE ESTERASE UR QL STRIP.AUTO: NEGATIVE
NITRITE UR QL STRIP: NEGATIVE
PH UR STRIP.AUTO: 5.5 [PH] (ref 5–8)
POTASSIUM SERPL-SCNC: 4.7 MMOL/L (ref 3.5–5.2)
PROT ?TM UR-MCNC: 5.5 MG/DL
PROT SERPL-MCNC: 7 G/DL (ref 6–8.5)
PROT UR QL STRIP: NEGATIVE
PROT/CREAT UR: 104.2 MG/G CREA (ref 0–200)
RBC # UR STRIP: NORMAL /HPF
REF LAB TEST METHOD: NORMAL
SODIUM SERPL-SCNC: 137 MMOL/L (ref 136–145)
SP GR UR STRIP: >1.03 (ref 1–1.03)
SQUAMOUS #/AREA URNS HPF: NORMAL /HPF
UROBILINOGEN UR QL STRIP: ABNORMAL
WBC # UR STRIP: NORMAL /HPF

## 2024-09-21 PROCEDURE — 81015 MICROSCOPIC EXAM OF URINE: CPT

## 2024-09-21 PROCEDURE — 84156 ASSAY OF PROTEIN URINE: CPT

## 2024-09-21 PROCEDURE — 36415 COLL VENOUS BLD VENIPUNCTURE: CPT

## 2024-09-21 PROCEDURE — 82043 UR ALBUMIN QUANTITATIVE: CPT

## 2024-09-21 PROCEDURE — 80053 COMPREHEN METABOLIC PANEL: CPT

## 2024-09-21 PROCEDURE — 81003 URINALYSIS AUTO W/O SCOPE: CPT

## 2024-09-21 PROCEDURE — 82570 ASSAY OF URINE CREATININE: CPT

## 2024-09-24 RX ORDER — FENOFIBRATE 134 MG/1
134 CAPSULE ORAL
Qty: 30 CAPSULE | Refills: 5 | Status: SHIPPED | OUTPATIENT
Start: 2024-09-24

## 2024-10-14 ENCOUNTER — TELEPHONE (OUTPATIENT)
Dept: FAMILY MEDICINE CLINIC | Facility: CLINIC | Age: 74
End: 2024-10-14

## 2024-10-14 NOTE — TELEPHONE ENCOUNTER
Please let him know that his last Cologuard was completed 3 years ago.  Those are supposed to be repeated in 3 years to make sure that nothing is changed in your colon.  I would recommend completing this at this time. I didn't order this at your visit. So I'm guessing Shayla automatically mailed this at the 3 year taj.

## 2024-10-14 NOTE — TELEPHONE ENCOUNTER
Caller: Hector Johnston Jr.    Relationship: Self    Best call back number: [phone]     What was the call regarding: PATIENT STATES HE RECEIVED A KIT FROM [employer] IN THE MAIL AND WOULD LIKE A CALL BACK TO DISCUSS THIS AND MAKE SURE IF HE NEEDS TO COMPLETE THIS.

## 2024-11-14 RX ORDER — ATORVASTATIN CALCIUM 40 MG/1
40 TABLET, FILM COATED ORAL NIGHTLY
Qty: 90 TABLET | Refills: 1 | Status: SHIPPED | OUTPATIENT
Start: 2024-11-14

## 2024-11-22 ENCOUNTER — OFFICE VISIT (OUTPATIENT)
Dept: FAMILY MEDICINE CLINIC | Facility: CLINIC | Age: 74
End: 2024-11-22
Payer: MEDICARE

## 2024-11-22 VITALS
OXYGEN SATURATION: 96 % | DIASTOLIC BLOOD PRESSURE: 60 MMHG | SYSTOLIC BLOOD PRESSURE: 112 MMHG | TEMPERATURE: 98.3 F | HEART RATE: 68 BPM | BODY MASS INDEX: 27.97 KG/M2 | WEIGHT: 199.8 LBS | HEIGHT: 71 IN

## 2024-11-22 DIAGNOSIS — Z79.4 TYPE 2 DIABETES MELLITUS WITH DIABETIC NEUROPATHY, WITH LONG-TERM CURRENT USE OF INSULIN: ICD-10-CM

## 2024-11-22 DIAGNOSIS — E11.40 TYPE 2 DIABETES MELLITUS WITH DIABETIC NEUROPATHY, WITH LONG-TERM CURRENT USE OF INSULIN: ICD-10-CM

## 2024-11-22 DIAGNOSIS — I25.10 ASCVD (ARTERIOSCLEROTIC CARDIOVASCULAR DISEASE): ICD-10-CM

## 2024-11-22 DIAGNOSIS — I10 PRIMARY HYPERTENSION: ICD-10-CM

## 2024-11-22 DIAGNOSIS — I21.4 NSTEMI (NON-ST ELEVATED MYOCARDIAL INFARCTION): ICD-10-CM

## 2024-11-22 DIAGNOSIS — Z12.11 COLON CANCER SCREENING: ICD-10-CM

## 2024-11-22 DIAGNOSIS — Z23 NEED FOR IMMUNIZATION AGAINST INFLUENZA: Primary | ICD-10-CM

## 2024-11-22 RX ORDER — METOPROLOL TARTRATE 25 MG/1
25 TABLET, FILM COATED ORAL EVERY 12 HOURS SCHEDULED
Qty: 180 TABLET | Refills: 2 | Status: SHIPPED | OUTPATIENT
Start: 2024-11-22

## 2024-11-22 RX ORDER — GLIMEPIRIDE 4 MG/1
4 TABLET ORAL 2 TIMES DAILY
Qty: 180 TABLET | Refills: 3 | Status: SHIPPED | OUTPATIENT
Start: 2024-11-22

## 2024-11-22 RX ORDER — ATORVASTATIN CALCIUM 40 MG/1
40 TABLET, FILM COATED ORAL NIGHTLY
Qty: 90 TABLET | Refills: 1 | Status: SHIPPED | OUTPATIENT
Start: 2024-11-22

## 2024-11-22 RX ORDER — CLOPIDOGREL BISULFATE 75 MG/1
75 TABLET ORAL DAILY
Qty: 30 TABLET | Refills: 6 | Status: SHIPPED | OUTPATIENT
Start: 2024-11-22

## 2024-11-22 NOTE — PROGRESS NOTES
Immunization  Immunization performed in left deltoid by Jo Ann Alvarez MA. Patient tolerated the procedure well without complications.  11/22/24   Jo Ann Alvarez MA

## 2024-11-22 NOTE — PROGRESS NOTES
Patient Name: Hector Johnston Today's Date: 2024   Patient MRN / CSN: [id number] / [id number] Date of Encounter: 2024   Patient Age / : 74 y.o. / 1950 Encounter Provider: EDMOND Sr   Referring Physician: No ref. provider found          Hector is a 74 y.o. male who is being seen today for Primary Care Follow-Up (3 month follow up. ) and Diabetes (Pt states sugar is doing well. )      History of Present Illness  He presents a follow-up evaluation.  He states he has been doing well.  He does need refills on multiple medications today.  He has a history of ASCVD as well as a MI in the past.  Switch currently takes clopidogrel, metoprolol, atorvastatin, and Jardiance for these issues.  He has no side effects this medication and is doing well with that.  Diabetes  He has type 2 diabetes mellitus. His disease course has been stable. Pertinent negatives for hypoglycemia include no nervousness/anxiousness. Pertinent negatives for diabetes include no chest pain. Symptoms are stable. Diabetic complications include heart disease. Risk factors for coronary artery disease include dyslipidemia, sedentary lifestyle and male sex. Current diabetic treatment includes insulin injections and oral agent (dual therapy). He is compliant with treatment most of the time.       Allergies include:Patient has no known allergies.  Current Outpatient Medications   Medication Sig Dispense Refill    aspirin 81 MG EC tablet Take 1 tablet by mouth Every Morning.      atorvastatin (LIPITOR) 40 MG tablet Take 1 tablet by mouth Every Night. 90 tablet 1    brimonidine (ALPHAGAN P) 0.1 % solution ophthalmic solution Administer 1 drop into the left eye 2 (Two) Times a Day.      cholecalciferol (VITAMIN D3) 1.25 MG (10025 UT) capsule Take 1 capsule by mouth Every 7 (Seven) Days.      clopidogrel (Plavix) 75 MG tablet Take 1 tablet by mouth Daily. 30 tablet 6    dorzolamide (TRUSOPT) 2 % ophthalmic solution Administer 1 drop  "into the left eye 3 (Three) Times a Day.      DULoxetine (CYMBALTA) 60 MG capsule Take 1 capsule by mouth Daily. 30 capsule 2    empagliflozin (Jardiance) 25 MG tablet tablet Take 1 tablet by mouth Daily. 30 tablet 6    fenofibrate micronized (LOFIBRA) 134 MG capsule Take 1 capsule by mouth Every Morning Before Breakfast. 30 capsule 5    gabapentin (NEURONTIN) 300 MG capsule Take 1 capsule by mouth 2 (Two) Times a Day. 60 capsule 2    glimepiride (AMARYL) 4 MG tablet Take 1 tablet by mouth 2 (Two) Times a Day. 180 tablet 3    glucose blood test strip Use to test blood sugar 3 times a day & as needed 100 each 5    Insulin Pen Needle (Pen Needles 3/16\") 31G X 5 MM misc Use 1 each Daily. 100 each 3    Lantus SoloStar 100 UNIT/ML injection pen Inject 30 Units under the skin into the appropriate area as directed Daily.      latanoprost (XALATAN) 0.005 % ophthalmic solution Administer 1 drop into the left eye Every Night.      Loratadine (CLARITIN PO) Take 10 mg by mouth Daily.      metFORMIN (GLUCOPHAGE) 1000 MG tablet Take 1 tablet by mouth 2 (Two) Times a Day With Meals.      metoprolol tartrate (LOPRESSOR) 25 MG tablet Take 1 tablet by mouth Every 12 Hours. 180 tablet 2    Multiple Vitamins-Minerals (MULTIVITAMIN ADULT PO) Take 1 tablet by mouth Every Morning.      Saw Palmetto 450 MG capsule Take  by mouth.      timolol (TIMOPTIC) 0.5 % ophthalmic solution Administer 1 drop to both eyes 2 (Two) Times a Day.      TRUEplus Lancets 33G misc Use to test blood sugar 3 times a day & as needed 100 each 5     No current facility-administered medications for this visit.     Past Medical History:   Diagnosis Date    Bilateral pseudophakia 12/29/2020    Diabetes mellitus     Epiretinal membrane (ERM) of left eye 03/03/2021    Hernia, inguinal, left 04/21/2024    UNKNOWN    Hypertension     Myocardial infarction     Primary open-angle glaucoma 12/29/2020    Vitreous prolapse, left eye 03/03/2021     Family History   Problem " "Relation Age of Onset    No Known Problems Father     No Known Problems Mother      Past Surgical History:   Procedure Laterality Date    CARDIAC CATHETERIZATION N/A 05/04/2018    Procedure: Left Heart Cath;  Surgeon: Jose Dickerson MD;  Location:  [facility];  Service: Cardiovascular    CARDIAC CATHETERIZATION N/A 04/23/2024    Procedure: Left Heart Cath;  Surgeon: Gunjan Saleh MD;  Location:  [facility];  Service: Cardiology;  Laterality: N/A;    CHOLECYSTECTOMY      CORONARY ANGIOPLASTY WITH STENT PLACEMENT      MOUTH SURGERY       Social History     Substance and Sexual Activity   Alcohol Use No     Social History     Tobacco Use   Smoking Status Never   Smokeless Tobacco Never     Social History     Substance and Sexual Activity   Drug Use No     Review of Systems   HENT:  Negative for congestion.    Respiratory:  Negative for chest tightness and wheezing.    Cardiovascular:  Negative for chest pain and palpitations.   Gastrointestinal:  Negative for abdominal pain.   Genitourinary:  Negative for difficulty urinating.   Psychiatric/Behavioral:  The patient is not nervous/anxious.         Depression Assessment Review:  PHQ-9 Total Score:    Vital Signs & Measurements Taken This Encounter  /60 (BP Location: Left arm, Patient Position: Sitting, Cuff Size: Adult)   Pulse 68   Temp 98.3 °F (36.8 °C) (Oral)   Ht 180.3 cm (70.98\")   Wt 90.6 kg (199 lb 12.8 oz)   SpO2 96%   BMI 27.88 kg/m²    SpO2 Percentage    11/22/24 0815   SpO2: 96%               Physical Exam  Constitutional:       Appearance: Normal appearance.   HENT:      Right Ear: External ear normal.      Left Ear: External ear normal.      Nose: Nose normal.      Mouth/Throat:      Mouth: Mucous membranes are moist.   Eyes:      Pupils: Pupils are equal, round, and reactive to light.   Cardiovascular:      Rate and Rhythm: Normal rate and regular rhythm.      Pulses: Normal pulses.   Pulmonary:      " Effort: Pulmonary effort is normal.   Abdominal:      Palpations: Abdomen is soft.   Musculoskeletal:         General: Normal range of motion.   Skin:     General: Skin is warm and dry.   Neurological:      General: No focal deficit present.      Mental Status: He is alert and oriented to person, place, and time.   Psychiatric:         Mood and Affect: Mood normal.         Behavior: Behavior normal.          Fall Risk Assessment:  Fall Risk Assessment was completed, and patient is at low risk for falls.    Assessment & Plan  Patient Active Problem List   Diagnosis    Hypogonadism in male    Corporo-venous occlusive erectile dysfunction    Abnormal cardiovascular stress test    Bilateral pseudophakia    Hernia, inguinal, left    Vitreous prolapse, left eye    Primary open-angle glaucoma    Epiretinal membrane (ERM) of left eye    NSTEMI (non-ST elevated myocardial infarction)    Type 2 diabetes mellitus with hyperglycemia, with long-term current use of insulin    ASCVD (arteriosclerotic cardiovascular disease)    Dyslipidemia       ICD-10-CM ICD-9-CM   1. Need for immunization against influenza  Z23 V04.81   2. Colon cancer screening  Z12.11 V76.51   3. Type 2 diabetes mellitus with diabetic neuropathy, with long-term current use of insulin  E11.40 250.60    Z79.4 357.2     V58.67   4. Primary hypertension  I10 401.9   5. ASCVD (arteriosclerotic cardiovascular disease)  I25.10 429.2     440.9   6. NSTEMI (non-ST elevated myocardial infarction)  I21.4 410.70     Diagnoses and all orders for this visit:    1. Need for immunization against influenza (Primary)  -     Fluzone High-Dose 65+yrs (9386-1976)    2. Colon cancer screening  -     Cologuard - Stool, Per Rectum; Future    3. Type 2 diabetes mellitus with diabetic neuropathy, with long-term current use of insulin  -     glimepiride (AMARYL) 4 MG tablet; Take 1 tablet by mouth 2 (Two) Times a Day.  Dispense: 180 tablet; Refill: 3  -     empagliflozin (Jardiance) 25  MG tablet tablet; Take 1 tablet by mouth Daily.  Dispense: 30 tablet; Refill: 6  -     CBC & Differential; Future  -     Comprehensive Metabolic Panel; Future  -     Hemoglobin A1c; Future  -     Lipid Panel; Future  -     TSH; Future  -     T4, Free; Future    4. Primary hypertension  -     metoprolol tartrate (LOPRESSOR) 25 MG tablet; Take 1 tablet by mouth Every 12 Hours.  Dispense: 180 tablet; Refill: 2    5. ASCVD (arteriosclerotic cardiovascular disease)  -     atorvastatin (LIPITOR) 40 MG tablet; Take 1 tablet by mouth Every Night.  Dispense: 90 tablet; Refill: 1    6. NSTEMI (non-ST elevated myocardial infarction)  -     clopidogrel (Plavix) 75 MG tablet; Take 1 tablet by mouth Daily.  Dispense: 30 tablet; Refill: 6  -     empagliflozin (Jardiance) 25 MG tablet tablet; Take 1 tablet by mouth Daily.  Dispense: 30 tablet; Refill: 6       -- We updated his flu shot today.  I also updated prescriptions on needed medications listed above.  We will update his lab work prior to his next visit.    --He states he received a letter that he needs to renew his Cologuard.  I will place that order today as well.      Return in about 3 months (around 2/22/2025) for Labs Prior.           This document has been electronically signed by EDMOND Sr  November 22, 2024 11:16 EDMOND Maher  [address] [place] [address]  [phone] (office)    Part of this note may be an electronic transcription/translation of spoken language to printed text using the Dragon Dictation System.

## 2024-12-01 DIAGNOSIS — M54.16 LUMBAR RADICULOPATHY, CHRONIC: ICD-10-CM

## 2024-12-01 DIAGNOSIS — Z79.4 TYPE 2 DIABETES MELLITUS WITH DIABETIC NEUROPATHY, WITH LONG-TERM CURRENT USE OF INSULIN: ICD-10-CM

## 2024-12-01 DIAGNOSIS — E11.40 TYPE 2 DIABETES MELLITUS WITH DIABETIC NEUROPATHY, WITH LONG-TERM CURRENT USE OF INSULIN: ICD-10-CM

## 2024-12-02 ENCOUNTER — OFFICE VISIT (OUTPATIENT)
Dept: ENDOCRINOLOGY | Facility: CLINIC | Age: 74
End: 2024-12-02
Payer: MEDICARE

## 2024-12-02 VITALS
DIASTOLIC BLOOD PRESSURE: 61 MMHG | BODY MASS INDEX: 28.19 KG/M2 | HEART RATE: 66 BPM | OXYGEN SATURATION: 96 % | WEIGHT: 202 LBS | SYSTOLIC BLOOD PRESSURE: 115 MMHG

## 2024-12-02 DIAGNOSIS — M54.16 LUMBAR RADICULOPATHY, CHRONIC: ICD-10-CM

## 2024-12-02 DIAGNOSIS — Z79.4 TYPE 2 DIABETES MELLITUS WITH DIABETIC NEUROPATHY, WITH LONG-TERM CURRENT USE OF INSULIN: ICD-10-CM

## 2024-12-02 DIAGNOSIS — E11.40 TYPE 2 DIABETES MELLITUS WITH DIABETIC NEUROPATHY, WITH LONG-TERM CURRENT USE OF INSULIN: ICD-10-CM

## 2024-12-02 DIAGNOSIS — E11.65 TYPE 2 DIABETES MELLITUS WITH HYPERGLYCEMIA, WITH LONG-TERM CURRENT USE OF INSULIN: Primary | ICD-10-CM

## 2024-12-02 DIAGNOSIS — Z79.4 TYPE 2 DIABETES MELLITUS WITH HYPERGLYCEMIA, WITH LONG-TERM CURRENT USE OF INSULIN: Primary | ICD-10-CM

## 2024-12-02 LAB
EXPIRATION DATE: ABNORMAL
EXPIRATION DATE: ABNORMAL
GLUCOSE BLDC GLUCOMTR-MCNC: 238 MG/DL (ref 70–130)
HBA1C MFR BLD: 7.7 % (ref 4.5–5.7)
Lab: ABNORMAL
Lab: ABNORMAL

## 2024-12-02 PROCEDURE — 82947 ASSAY GLUCOSE BLOOD QUANT: CPT | Performed by: NURSE PRACTITIONER

## 2024-12-02 PROCEDURE — 1159F MED LIST DOCD IN RCRD: CPT | Performed by: NURSE PRACTITIONER

## 2024-12-02 PROCEDURE — 83036 HEMOGLOBIN GLYCOSYLATED A1C: CPT | Performed by: NURSE PRACTITIONER

## 2024-12-02 PROCEDURE — 99214 OFFICE O/P EST MOD 30 MIN: CPT | Performed by: NURSE PRACTITIONER

## 2024-12-02 PROCEDURE — 1160F RVW MEDS BY RX/DR IN RCRD: CPT | Performed by: NURSE PRACTITIONER

## 2024-12-02 PROCEDURE — 3051F HG A1C>EQUAL 7.0%<8.0%: CPT | Performed by: NURSE PRACTITIONER

## 2024-12-02 RX ORDER — DULOXETIN HYDROCHLORIDE 60 MG/1
60 CAPSULE, DELAYED RELEASE ORAL DAILY
Qty: 30 CAPSULE | Refills: 2 | OUTPATIENT
Start: 2024-12-02

## 2024-12-02 RX ORDER — GABAPENTIN 300 MG/1
300 CAPSULE ORAL 2 TIMES DAILY
Qty: 60 CAPSULE | OUTPATIENT
Start: 2024-12-02

## 2024-12-02 RX ORDER — DULOXETIN HYDROCHLORIDE 60 MG/1
60 CAPSULE, DELAYED RELEASE ORAL DAILY
Qty: 30 CAPSULE | Refills: 2 | Status: SHIPPED | OUTPATIENT
Start: 2024-12-02

## 2024-12-02 RX ORDER — GABAPENTIN 300 MG/1
300 CAPSULE ORAL 2 TIMES DAILY
Qty: 60 CAPSULE | Refills: 2 | Status: SHIPPED | OUTPATIENT
Start: 2024-12-02

## 2024-12-02 NOTE — PROGRESS NOTES
Chief Complaint   Patient presents with    Diabetes     F/u with dm2,         Referring Provider  No ref. provider found     HPI   Hector Johnston Jr. is a 74 y.o. male had concerns including Diabetes (F/u with dm2, ).    T2DM.    He reports that he is overall doing well.  He denies any changes to his health at this time.  He is taking his meds regularly without missing doses.    Diabetes:  Diabetes was diagnosed 2004.  Complications include heart attack 04/2024.  Last ophtho exam was 2023.  Current medications for diabetes include Lantus 30 units QD, Metformin 1000 mg BID, Glimiperide 4 mg BID, Jardiance 25 mg QD.  Previous meds: Glipizide-switched to glimiperide, Jardiance-insurance issue  He checks his blood sugar 1 times per day.   Hypos:none    ACE/ARB:no, Statin: yes  Labs:  Lipid Panel:utd  ABDULAZIZ: utd    The following portions of the patient's history were reviewed and updated as appropriate: allergies, current medications, past family history, past medical history, past social history, past surgical history, and problem list.    Diet: he doesn't try to limit his diet.    Past Medical History:   Diagnosis Date    Bilateral pseudophakia 12/29/2020    Diabetes mellitus     Epiretinal membrane (ERM) of left eye 03/03/2021    Hernia, inguinal, left 04/21/2024    UNKNOWN    Hypertension     Myocardial infarction     Primary open-angle glaucoma 12/29/2020    Vitreous prolapse, left eye 03/03/2021     Past Surgical History:   Procedure Laterality Date    CARDIAC CATHETERIZATION N/A 05/04/2018    Procedure: Left Heart Cath;  Surgeon: Jose Dickerson MD;  Location:  [facility];  Service: Cardiovascular    CARDIAC CATHETERIZATION N/A 04/23/2024    Procedure: Left Heart Cath;  Surgeon: Gunjan Saleh MD;  Location:  [facility];  Service: Cardiology;  Laterality: N/A;    CHOLECYSTECTOMY      CORONARY ANGIOPLASTY WITH STENT PLACEMENT      MOUTH SURGERY        Family History   Problem  "Relation Age of Onset    No Known Problems Father     No Known Problems Mother       Social History     Socioeconomic History    Marital status:    Tobacco Use    Smoking status: Never    Smokeless tobacco: Never   Vaping Use    Vaping status: Never Used   Substance and Sexual Activity    Alcohol use: No    Drug use: No    Sexual activity: Yes     Partners: Female      No Known Allergies   Current Outpatient Medications on File Prior to Visit   Medication Sig Dispense Refill    aspirin 81 MG EC tablet Take 1 tablet by mouth Every Morning.      atorvastatin (LIPITOR) 40 MG tablet Take 1 tablet by mouth Every Night. 90 tablet 1    brimonidine (ALPHAGAN P) 0.1 % solution ophthalmic solution Administer 1 drop into the left eye 2 (Two) Times a Day.      cholecalciferol (VITAMIN D3) 1.25 MG (22391 UT) capsule Take 1 capsule by mouth Every 7 (Seven) Days.      clopidogrel (Plavix) 75 MG tablet Take 1 tablet by mouth Daily. 30 tablet 6    dorzolamide (TRUSOPT) 2 % ophthalmic solution Administer 1 drop into the left eye 3 (Three) Times a Day.      empagliflozin (Jardiance) 25 MG tablet tablet Take 1 tablet by mouth Daily. 30 tablet 6    fenofibrate micronized (LOFIBRA) 134 MG capsule Take 1 capsule by mouth Every Morning Before Breakfast. 30 capsule 5    glimepiride (AMARYL) 4 MG tablet Take 1 tablet by mouth 2 (Two) Times a Day. 180 tablet 3    Insulin Pen Needle (Pen Needles 3/16\") 31G X 5 MM misc Use 1 each Daily. 100 each 3    Lantus SoloStar 100 UNIT/ML injection pen Inject 30 Units under the skin into the appropriate area as directed Daily.      latanoprost (XALATAN) 0.005 % ophthalmic solution Administer 1 drop into the left eye Every Night.      Loratadine (CLARITIN PO) Take 10 mg by mouth Daily.      metFORMIN (GLUCOPHAGE) 1000 MG tablet Take 1 tablet by mouth 2 (Two) Times a Day With Meals.      metoprolol tartrate (LOPRESSOR) 25 MG tablet Take 1 tablet by mouth Every 12 Hours. 180 tablet 2    Multiple " Vitamins-Minerals (MULTIVITAMIN ADULT PO) Take 1 tablet by mouth Every Morning.      Saw Palmetto 450 MG capsule Take  by mouth.      timolol (TIMOPTIC) 0.5 % ophthalmic solution Administer 1 drop to both eyes 2 (Two) Times a Day.      TRUEplus Lancets 33G misc Use to test blood sugar 3 times a day & as needed 100 each 5    [DISCONTINUED] DULoxetine (CYMBALTA) 60 MG capsule Take 1 capsule by mouth Daily. 30 capsule 2    [DISCONTINUED] gabapentin (NEURONTIN) 300 MG capsule Take 1 capsule by mouth 2 (Two) Times a Day. 60 capsule 2    [DISCONTINUED] glucose blood test strip Use to test blood sugar 3 times a day & as needed 100 each 5     No current facility-administered medications on file prior to visit.        Review of Systems   Constitutional:  Positive for fatigue. Negative for unexpected weight gain and unexpected weight loss.   Eyes:  Positive for blurred vision.   Endocrine: Positive for polydipsia, polyphagia and polyuria.   Psychiatric/Behavioral:  Negative for sleep disturbance.    All other systems reviewed and are negative.    /61 (BP Location: Right arm, Patient Position: Sitting, Cuff Size: Adult)   Pulse 66   Wt 91.6 kg (202 lb)   SpO2 96%   BMI 28.19 kg/m²      Physical Exam  Vitals reviewed.   Constitutional:       Appearance: Normal appearance.   Eyes:      Extraocular Movements: Extraocular movements intact.   Cardiovascular:      Rate and Rhythm: Normal rate.   Pulmonary:      Effort: Pulmonary effort is normal.   Neurological:      General: No focal deficit present.      Mental Status: He is alert and oriented to person, place, and time.   Psychiatric:         Mood and Affect: Mood normal.         Behavior: Behavior normal.         Thought Content: Thought content normal.         Judgment: Judgment normal.       CMP:  Lab Results   Component Value Date    Glucose 238 (A) 12/02/2024    Glucose, UA >=1000 mg/dL (3+) (A) 09/21/2024    BUN 23 09/21/2024    BUN/Creatinine Ratio 15.5  09/21/2024    Creatinine 1.48 (H) 09/21/2024    Creatinine, Urine 52.8 09/21/2024    Ketones, UA Negative 09/21/2024    CO2 23.0 09/21/2024    CO2 Content 23.5 01/17/2021    Calcium 9.6 09/21/2024    Albumin 4.3 09/21/2024    AST (SGOT) 20 09/21/2024    ALT (SGPT) 20 09/21/2024     Lipid Panel:  Lab Results   Component Value Date    CHOL 127 04/24/2024    TRIG 203 (H) 04/24/2024    HDL 28 (L) 04/24/2024    VLDL 34 04/24/2024    LDL 65 04/24/2024     HbA1c:  Lab Results   Component Value Date    HGBA1C 7.7 (A) 12/02/2024     Glucose:  Lab Results   Component Value Date    POCGLU 238 (A) 12/02/2024     Microalbumin:  Lab Results   Component Value Date    MALBCRERATIO  05/22/2024      Comment:      Unable to calculate     TSH:  Lab Results   Component Value Date    TSH 1.800 04/21/2024       Assessment and Plan    Diagnoses and all orders for this visit:    1. Type 2 diabetes mellitus with hyperglycemia, with long-term current use of insulin (Primary)  Assessment & Plan:  -Diabetes is improving with A1c down to 7.7.  -Discussed dietary and exercise guidelines with patient.  -Discussed the importance of yearly eye exams.  -Discussed the importance of checking BG's regularly.    -Continue Lantus 30 units QD.   -Continue Metformin 1000 mg BID.  -Continue Glimiperide 4 mg BID.  -Continue Jardiance 25 mg QD. Discussed the medication's MOA and that it may cause more frequent urination particularly upon starting the medication. Take one tablet in the morning with or without food. Encouraged to drink plenty of water as to not get dehydrated especially in warmer weather or with activity. Also discussed there may be an increased incidence of UTIs or yeast infections and to monitor for signs/symptoms.  -S/S hypoglycemia reviewed with Rule of 15's advised.  -Follow-up in 3 months.    Orders:  -     POC Glycosylated Hemoglobin (Hb A1C)  -     POC Glucose, Blood    Other orders  -     glucose blood test strip; Use to test blood  sugar 3 times a day & as needed  Dispense: 100 each; Refill: 5         Return in about 3 months (around 3/2/2025) for Follow-up appointment, A1C. The patient was instructed to contact the clinic with any interval questions or concerns.        This document has been electronically signed by EDMOND Faye  December 2, 2024 09:12 EST   Endocrinology    Please note that portions of this document were completed with a voice recognition program. Efforts were made to edit the dictations, but occasionally words are mis-transcribed.

## 2024-12-02 NOTE — ASSESSMENT & PLAN NOTE
-Diabetes is improving with A1c down to 7.7.  -Discussed dietary and exercise guidelines with patient.  -Discussed the importance of yearly eye exams.  -Discussed the importance of checking BG's regularly.    -Continue Lantus 30 units QD.   -Continue Metformin 1000 mg BID.  -Continue Glimiperide 4 mg BID.  -Continue Jardiance 25 mg QD. Discussed the medication's MOA and that it may cause more frequent urination particularly upon starting the medication. Take one tablet in the morning with or without food. Encouraged to drink plenty of water as to not get dehydrated especially in warmer weather or with activity. Also discussed there may be an increased incidence of UTIs or yeast infections and to monitor for signs/symptoms.  -S/S hypoglycemia reviewed with Rule of 15's advised.  -Follow-up in 3 months.

## 2025-01-26 ENCOUNTER — LAB (OUTPATIENT)
Dept: LAB | Facility: HOSPITAL | Age: 75
End: 2025-01-26
Payer: MEDICARE

## 2025-01-26 ENCOUNTER — TRANSCRIBE ORDERS (OUTPATIENT)
Dept: LAB | Facility: HOSPITAL | Age: 75
End: 2025-01-26
Payer: MEDICARE

## 2025-01-26 DIAGNOSIS — N18.9 CHRONIC KIDNEY DISEASE, UNSPECIFIED CKD STAGE: ICD-10-CM

## 2025-01-26 DIAGNOSIS — N18.9 CHRONIC KIDNEY DISEASE, UNSPECIFIED CKD STAGE: Primary | ICD-10-CM

## 2025-01-26 LAB
ALBUMIN SERPL-MCNC: 4.2 G/DL (ref 3.5–5.2)
ALBUMIN UR-MCNC: <1.2 MG/DL
ALBUMIN/GLOB SERPL: 1.4 G/DL
ALP SERPL-CCNC: 58 U/L (ref 39–117)
ALT SERPL W P-5'-P-CCNC: 22 U/L (ref 1–41)
ANION GAP SERPL CALCULATED.3IONS-SCNC: 11 MMOL/L (ref 5–15)
AST SERPL-CCNC: 26 U/L (ref 1–40)
BILIRUB SERPL-MCNC: 0.2 MG/DL (ref 0–1.2)
BILIRUB UR QL STRIP: NEGATIVE
BUN SERPL-MCNC: 23 MG/DL (ref 8–23)
BUN/CREAT SERPL: 16.8 (ref 7–25)
CALCIUM SPEC-SCNC: 9.5 MG/DL (ref 8.6–10.5)
CHLORIDE SERPL-SCNC: 104 MMOL/L (ref 98–107)
CLARITY UR: CLEAR
CO2 SERPL-SCNC: 22 MMOL/L (ref 22–29)
COLOR UR: YELLOW
CREAT SERPL-MCNC: 1.37 MG/DL (ref 0.76–1.27)
CREAT UR-MCNC: 62.8 MG/DL
CREAT UR-MCNC: 62.8 MG/DL
EGFRCR SERPLBLD CKD-EPI 2021: 54.1 ML/MIN/1.73
GLOBULIN UR ELPH-MCNC: 2.9 GM/DL
GLUCOSE SERPL-MCNC: 172 MG/DL (ref 65–99)
GLUCOSE UR STRIP-MCNC: ABNORMAL MG/DL
HGB UR QL STRIP.AUTO: NEGATIVE
HOLD SPECIMEN: NORMAL
KETONES UR QL STRIP: NEGATIVE
LEUKOCYTE ESTERASE UR QL STRIP.AUTO: NEGATIVE
MICROALBUMIN/CREAT UR: NORMAL MG/G{CREAT}
NITRITE UR QL STRIP: NEGATIVE
PH UR STRIP.AUTO: 5.5 [PH] (ref 5–8)
POTASSIUM SERPL-SCNC: 4.8 MMOL/L (ref 3.5–5.2)
PROT ?TM UR-MCNC: 5.2 MG/DL
PROT SERPL-MCNC: 7.1 G/DL (ref 6–8.5)
PROT UR QL STRIP: NEGATIVE
PROT/CREAT UR: 82.8 MG/G CREA (ref 0–200)
SODIUM SERPL-SCNC: 137 MMOL/L (ref 136–145)
SP GR UR STRIP: >1.03 (ref 1–1.03)
UROBILINOGEN UR QL STRIP: ABNORMAL

## 2025-01-26 PROCEDURE — 84156 ASSAY OF PROTEIN URINE: CPT

## 2025-01-26 PROCEDURE — 81003 URINALYSIS AUTO W/O SCOPE: CPT

## 2025-01-26 PROCEDURE — 80053 COMPREHEN METABOLIC PANEL: CPT

## 2025-01-26 PROCEDURE — 36415 COLL VENOUS BLD VENIPUNCTURE: CPT

## 2025-01-26 PROCEDURE — 82570 ASSAY OF URINE CREATININE: CPT

## 2025-01-26 PROCEDURE — 82043 UR ALBUMIN QUANTITATIVE: CPT

## 2025-02-19 ENCOUNTER — LAB (OUTPATIENT)
Dept: LAB | Facility: HOSPITAL | Age: 75
End: 2025-02-19
Payer: MEDICARE

## 2025-02-19 DIAGNOSIS — Z79.4 TYPE 2 DIABETES MELLITUS WITH DIABETIC NEUROPATHY, WITH LONG-TERM CURRENT USE OF INSULIN: ICD-10-CM

## 2025-02-19 DIAGNOSIS — E11.40 TYPE 2 DIABETES MELLITUS WITH DIABETIC NEUROPATHY, WITH LONG-TERM CURRENT USE OF INSULIN: ICD-10-CM

## 2025-02-19 LAB
ALBUMIN SERPL-MCNC: 4.1 G/DL (ref 3.5–5.2)
ALBUMIN/GLOB SERPL: 1.4 G/DL
ALP SERPL-CCNC: 60 U/L (ref 39–117)
ALT SERPL W P-5'-P-CCNC: 20 U/L (ref 1–41)
ANION GAP SERPL CALCULATED.3IONS-SCNC: 7.3 MMOL/L (ref 5–15)
AST SERPL-CCNC: 26 U/L (ref 1–40)
BASOPHILS # BLD AUTO: 0.03 10*3/MM3 (ref 0–0.2)
BASOPHILS NFR BLD AUTO: 0.4 % (ref 0–1.5)
BILIRUB SERPL-MCNC: 0.3 MG/DL (ref 0–1.2)
BUN SERPL-MCNC: 19 MG/DL (ref 8–23)
BUN/CREAT SERPL: 11.7 (ref 7–25)
CALCIUM SPEC-SCNC: 9.8 MG/DL (ref 8.6–10.5)
CHLORIDE SERPL-SCNC: 107 MMOL/L (ref 98–107)
CHOLEST SERPL-MCNC: 133 MG/DL (ref 0–200)
CO2 SERPL-SCNC: 25.7 MMOL/L (ref 22–29)
CREAT SERPL-MCNC: 1.62 MG/DL (ref 0.76–1.27)
DEPRECATED RDW RBC AUTO: 41.2 FL (ref 37–54)
EGFRCR SERPLBLD CKD-EPI 2021: 44.3 ML/MIN/1.73
EOSINOPHIL # BLD AUTO: 0.21 10*3/MM3 (ref 0–0.4)
EOSINOPHIL NFR BLD AUTO: 3 % (ref 0.3–6.2)
ERYTHROCYTE [DISTWIDTH] IN BLOOD BY AUTOMATED COUNT: 12.7 % (ref 12.3–15.4)
GLOBULIN UR ELPH-MCNC: 3 GM/DL
GLUCOSE SERPL-MCNC: 206 MG/DL (ref 65–99)
HBA1C MFR BLD: 8.3 % (ref 4.8–5.6)
HCT VFR BLD AUTO: 43.7 % (ref 37.5–51)
HDLC SERPL-MCNC: 27 MG/DL (ref 40–60)
HGB BLD-MCNC: 14.6 G/DL (ref 13–17.7)
IMM GRANULOCYTES # BLD AUTO: 0.06 10*3/MM3 (ref 0–0.05)
IMM GRANULOCYTES NFR BLD AUTO: 0.9 % (ref 0–0.5)
LDLC SERPL CALC-MCNC: 68 MG/DL (ref 0–100)
LDLC/HDLC SERPL: 2.24 {RATIO}
LYMPHOCYTES # BLD AUTO: 2.04 10*3/MM3 (ref 0.7–3.1)
LYMPHOCYTES NFR BLD AUTO: 29.6 % (ref 19.6–45.3)
MCH RBC QN AUTO: 29.7 PG (ref 26.6–33)
MCHC RBC AUTO-ENTMCNC: 33.4 G/DL (ref 31.5–35.7)
MCV RBC AUTO: 88.8 FL (ref 79–97)
MONOCYTES # BLD AUTO: 0.7 10*3/MM3 (ref 0.1–0.9)
MONOCYTES NFR BLD AUTO: 10.2 % (ref 5–12)
NEUTROPHILS NFR BLD AUTO: 3.85 10*3/MM3 (ref 1.7–7)
NEUTROPHILS NFR BLD AUTO: 55.9 % (ref 42.7–76)
NRBC BLD AUTO-RTO: 0 /100 WBC (ref 0–0.2)
PLATELET # BLD AUTO: 210 10*3/MM3 (ref 140–450)
PMV BLD AUTO: 10.4 FL (ref 6–12)
POTASSIUM SERPL-SCNC: 5.1 MMOL/L (ref 3.5–5.2)
PROT SERPL-MCNC: 7.1 G/DL (ref 6–8.5)
RBC # BLD AUTO: 4.92 10*6/MM3 (ref 4.14–5.8)
SODIUM SERPL-SCNC: 140 MMOL/L (ref 136–145)
T4 FREE SERPL-MCNC: 0.95 NG/DL (ref 0.92–1.68)
TRIGL SERPL-MCNC: 228 MG/DL (ref 0–150)
TSH SERPL DL<=0.05 MIU/L-ACNC: 1.76 UIU/ML (ref 0.27–4.2)
VLDLC SERPL-MCNC: 38 MG/DL (ref 5–40)
WBC NRBC COR # BLD AUTO: 6.89 10*3/MM3 (ref 3.4–10.8)

## 2025-02-19 PROCEDURE — 80053 COMPREHEN METABOLIC PANEL: CPT

## 2025-02-19 PROCEDURE — 84443 ASSAY THYROID STIM HORMONE: CPT

## 2025-02-19 PROCEDURE — 83036 HEMOGLOBIN GLYCOSYLATED A1C: CPT

## 2025-02-19 PROCEDURE — 85025 COMPLETE CBC W/AUTO DIFF WBC: CPT

## 2025-02-19 PROCEDURE — 80061 LIPID PANEL: CPT

## 2025-02-19 PROCEDURE — 84439 ASSAY OF FREE THYROXINE: CPT

## 2025-02-20 DIAGNOSIS — R79.89 ELEVATED SERUM CREATININE: Primary | ICD-10-CM

## 2025-02-24 ENCOUNTER — OFFICE VISIT (OUTPATIENT)
Dept: FAMILY MEDICINE CLINIC | Facility: CLINIC | Age: 75
End: 2025-02-24
Payer: MEDICARE

## 2025-02-24 VITALS
TEMPERATURE: 98.2 F | SYSTOLIC BLOOD PRESSURE: 112 MMHG | OXYGEN SATURATION: 95 % | WEIGHT: 200.4 LBS | HEART RATE: 63 BPM | BODY MASS INDEX: 28.06 KG/M2 | HEIGHT: 71 IN | DIASTOLIC BLOOD PRESSURE: 64 MMHG

## 2025-02-24 DIAGNOSIS — R79.89 ELEVATED SERUM CREATININE: ICD-10-CM

## 2025-02-24 DIAGNOSIS — Z79.4 TYPE 2 DIABETES MELLITUS WITH DIABETIC NEUROPATHY, WITH LONG-TERM CURRENT USE OF INSULIN: Primary | ICD-10-CM

## 2025-02-24 DIAGNOSIS — Z79.899 MEDICATION MANAGEMENT: ICD-10-CM

## 2025-02-24 DIAGNOSIS — M54.16 LUMBAR RADICULOPATHY, CHRONIC: ICD-10-CM

## 2025-02-24 DIAGNOSIS — E11.40 TYPE 2 DIABETES MELLITUS WITH DIABETIC NEUROPATHY, WITH LONG-TERM CURRENT USE OF INSULIN: Primary | ICD-10-CM

## 2025-02-24 LAB
AMPHET+METHAMPHET UR QL: NEGATIVE
AMPHETAMINES UR QL: NEGATIVE
ANION GAP SERPL CALCULATED.3IONS-SCNC: 12.1 MMOL/L (ref 5–15)
BARBITURATES UR QL SCN: NEGATIVE
BENZODIAZ UR QL SCN: NEGATIVE
BUN SERPL-MCNC: 23 MG/DL (ref 8–23)
BUN/CREAT SERPL: 17.6 (ref 7–25)
BUPRENORPHINE SERPL-MCNC: NEGATIVE NG/ML
CALCIUM SPEC-SCNC: 9.4 MG/DL (ref 8.6–10.5)
CANNABINOIDS SERPL QL: NEGATIVE
CHLORIDE SERPL-SCNC: 105 MMOL/L (ref 98–107)
CO2 SERPL-SCNC: 22.9 MMOL/L (ref 22–29)
COCAINE UR QL: NEGATIVE
CREAT SERPL-MCNC: 1.31 MG/DL (ref 0.76–1.27)
EGFRCR SERPLBLD CKD-EPI 2021: 57.1 ML/MIN/1.73
FENTANYL UR-MCNC: NEGATIVE NG/ML
GLUCOSE SERPL-MCNC: 235 MG/DL (ref 65–99)
METHADONE UR QL SCN: NEGATIVE
OPIATES UR QL: NEGATIVE
OXYCODONE UR QL SCN: NEGATIVE
PCP UR QL SCN: NEGATIVE
POTASSIUM SERPL-SCNC: 4.6 MMOL/L (ref 3.5–5.2)
SODIUM SERPL-SCNC: 140 MMOL/L (ref 136–145)
TRICYCLICS UR QL SCN: NEGATIVE

## 2025-02-24 PROCEDURE — 82570 ASSAY OF URINE CREATININE: CPT | Performed by: NURSE PRACTITIONER

## 2025-02-24 PROCEDURE — 3052F HG A1C>EQUAL 8.0%<EQUAL 9.0%: CPT | Performed by: NURSE PRACTITIONER

## 2025-02-24 PROCEDURE — 36415 COLL VENOUS BLD VENIPUNCTURE: CPT | Performed by: NURSE PRACTITIONER

## 2025-02-24 PROCEDURE — 82043 UR ALBUMIN QUANTITATIVE: CPT | Performed by: NURSE PRACTITIONER

## 2025-02-24 PROCEDURE — 80048 BASIC METABOLIC PNL TOTAL CA: CPT | Performed by: NURSE PRACTITIONER

## 2025-02-24 PROCEDURE — 1126F AMNT PAIN NOTED NONE PRSNT: CPT | Performed by: NURSE PRACTITIONER

## 2025-02-24 PROCEDURE — 99214 OFFICE O/P EST MOD 30 MIN: CPT | Performed by: NURSE PRACTITIONER

## 2025-02-24 PROCEDURE — 80307 DRUG TEST PRSMV CHEM ANLYZR: CPT | Performed by: NURSE PRACTITIONER

## 2025-02-24 RX ORDER — DULOXETIN HYDROCHLORIDE 60 MG/1
60 CAPSULE, DELAYED RELEASE ORAL DAILY
Qty: 30 CAPSULE | Refills: 2 | Status: SHIPPED | OUTPATIENT
Start: 2025-02-24

## 2025-02-24 RX ORDER — GABAPENTIN 300 MG/1
300 CAPSULE ORAL 2 TIMES DAILY
Qty: 60 CAPSULE | Refills: 2 | Status: SHIPPED | OUTPATIENT
Start: 2025-02-24

## 2025-02-24 NOTE — PROGRESS NOTES
Venipuncture Blood Specimen Collection  Venipuncture performed in left arm by Jo Ann Alvarez MA with good hemostasis. Patient tolerated the procedure well without complications.   02/24/25   Jo Ann Alvarez MA

## 2025-02-24 NOTE — PROGRESS NOTES
Patient Name: Hector Johnston Today's Date: 2025   Patient MRN / CSN: [id number] / [id number] Date of Encounter: 2025   Patient Age / : 75 y.o. / 1950 Encounter Provider: EDMOND Sr   Referring Physician: No ref. provider found          Hector is a 75 y.o. male who is being seen today for Primary Care Follow-Up (3 month follow up. ) and Diabetes (Pt states that his sugar is doing good. )      Diabetes  He presents for his follow-up diabetic visit. He has type 2 diabetes mellitus. Associated symptoms include weakness. Symptoms are improving. Diabetic complications include nephropathy and peripheral neuropathy. Risk factors for coronary artery disease include dyslipidemia, diabetes mellitus, male sex and sedentary lifestyle.   Neurologic Problem  The patient's primary symptoms include weakness. This is a chronic problem. The current episode started more than 1 year ago. The neurological problem developed gradually. The problem has been gradually improving since onset. Associated symptoms include back pain. Treatments tried: gabapentin and duloxetine. The treatment provided significant relief.       Allergies include:Patient has no known allergies.  Current Outpatient Medications   Medication Sig Dispense Refill    aspirin 81 MG EC tablet Take 1 tablet by mouth Every Morning.      atorvastatin (LIPITOR) 40 MG tablet Take 1 tablet by mouth Every Night. 90 tablet 1    brimonidine (ALPHAGAN P) 0.1 % solution ophthalmic solution Administer 1 drop into the left eye 2 (Two) Times a Day.      cholecalciferol (VITAMIN D3) 1.25 MG (94285 UT) capsule Take 1 capsule by mouth Every 7 (Seven) Days.      clopidogrel (Plavix) 75 MG tablet Take 1 tablet by mouth Daily. 30 tablet 6    dorzolamide (TRUSOPT) 2 % ophthalmic solution Administer 1 drop into the left eye 3 (Three) Times a Day.      DULoxetine (CYMBALTA) 60 MG capsule Take 1 capsule by mouth Daily. 30 capsule 2    empagliflozin (Jardiance) 25 MG  "tablet tablet Take 1 tablet by mouth Daily. 30 tablet 6    fenofibrate micronized (LOFIBRA) 134 MG capsule Take 1 capsule by mouth Every Morning Before Breakfast. 30 capsule 5    gabapentin (NEURONTIN) 300 MG capsule Take 1 capsule by mouth 2 (Two) Times a Day. 60 capsule 2    glimepiride (AMARYL) 4 MG tablet Take 1 tablet by mouth 2 (Two) Times a Day. 180 tablet 3    glucose blood test strip Use to test blood sugar 3 times a day & as needed 100 each 5    Insulin Pen Needle (Pen Needles 3/16\") 31G X 5 MM misc Use 1 each Daily. 100 each 3    Lantus SoloStar 100 UNIT/ML injection pen Inject 30 Units under the skin into the appropriate area as directed Daily.      latanoprost (XALATAN) 0.005 % ophthalmic solution Administer 1 drop into the left eye Every Night.      Loratadine (CLARITIN PO) Take 10 mg by mouth Daily.      metFORMIN (GLUCOPHAGE) 1000 MG tablet Take 1 tablet by mouth 2 (Two) Times a Day With Meals.      metoprolol tartrate (LOPRESSOR) 25 MG tablet Take 1 tablet by mouth Every 12 Hours. 180 tablet 2    Multiple Vitamins-Minerals (MULTIVITAMIN ADULT PO) Take 1 tablet by mouth Every Morning.      Saw Palmetto 450 MG capsule Take  by mouth.      timolol (TIMOPTIC) 0.5 % ophthalmic solution Administer 1 drop to both eyes 2 (Two) Times a Day.      TRUEplus Lancets 33G misc Use to test blood sugar 3 times a day & as needed 100 each 5     No current facility-administered medications for this visit.     Past Medical History:   Diagnosis Date    Bilateral pseudophakia 12/29/2020    Diabetes mellitus     Epiretinal membrane (ERM) of left eye 03/03/2021    Hernia, inguinal, left 04/21/2024    UNKNOWN    Hypertension     Myocardial infarction     Primary open-angle glaucoma 12/29/2020    Vitreous prolapse, left eye 03/03/2021     Family History   Problem Relation Age of Onset    No Known Problems Father     No Known Problems Mother      Past Surgical History:   Procedure Laterality Date    CARDIAC CATHETERIZATION N/A " "05/04/2018    Procedure: Left Heart Cath;  Surgeon: Jose Dickerson MD;  Location:  [facility];  Service: Cardiovascular    CARDIAC CATHETERIZATION N/A 04/23/2024    Procedure: Left Heart Cath;  Surgeon: Gunjan Saleh MD;  Location:  [facility];  Service: Cardiology;  Laterality: N/A;    CHOLECYSTECTOMY      CORONARY ANGIOPLASTY WITH STENT PLACEMENT      MOUTH SURGERY       Social History     Substance and Sexual Activity   Alcohol Use No     Social History     Tobacco Use   Smoking Status Never   Smokeless Tobacco Never     Social History     Substance and Sexual Activity   Drug Use No     Review of Systems   Musculoskeletal:  Positive for back pain.   Neurological:  Positive for weakness.        Depression Assessment Review:  PHQ-9 Total Score:    Vital Signs & Measurements Taken This Encounter  /64 (BP Location: Left arm, Patient Position: Sitting, Cuff Size: Adult)   Pulse 63   Temp 98.2 °F (36.8 °C) (Oral)   Ht 180.3 cm (70.98\")   Wt 90.9 kg (200 lb 6.4 oz)   SpO2 95%   BMI 27.96 kg/m²    SpO2 Percentage    02/24/25 0804   SpO2: 95%        BMI is >= 25 and <30. (Overweight) The following options were offered after discussion;: exercise counseling/recommendations and nutrition counseling/recommendations      Physical Exam  Constitutional:       Appearance: Normal appearance.   HENT:      Right Ear: External ear normal.      Left Ear: External ear normal.      Nose: Nose normal.      Mouth/Throat:      Mouth: Mucous membranes are moist.   Eyes:      Pupils: Pupils are equal, round, and reactive to light.   Cardiovascular:      Rate and Rhythm: Normal rate and regular rhythm.      Pulses: Normal pulses.   Pulmonary:      Effort: Pulmonary effort is normal.   Abdominal:      Palpations: Abdomen is soft.   Musculoskeletal:         General: Normal range of motion.   Skin:     General: Skin is warm.   Neurological:      General: No focal deficit present.      Mental " Status: He is alert and oriented to person, place, and time.   Psychiatric:         Mood and Affect: Mood normal.         Behavior: Behavior normal.          Fall Risk Assessment:  Fall Risk Assessment was completed, and patient is at low risk for falls.    Assessment & Plan  Patient Active Problem List   Diagnosis    Hypogonadism in male    Corporo-venous occlusive erectile dysfunction    Abnormal cardiovascular stress test    Bilateral pseudophakia    Hernia, inguinal, left    Vitreous prolapse, left eye    Primary open-angle glaucoma    Epiretinal membrane (ERM) of left eye    NSTEMI (non-ST elevated myocardial infarction)    Type 2 diabetes mellitus with hyperglycemia, with long-term current use of insulin    ASCVD (arteriosclerotic cardiovascular disease)    Dyslipidemia       ICD-10-CM ICD-9-CM   1. Type 2 diabetes mellitus with diabetic neuropathy, with long-term current use of insulin  E11.40 250.60    Z79.4 357.2     V58.67   2. Medication management  Z79.899 V58.69   3. Lumbar radiculopathy, chronic  M54.16 724.4   4. Elevated serum creatinine  R79.89 790.99     Diagnoses and all orders for this visit:    1. Type 2 diabetes mellitus with diabetic neuropathy, with long-term current use of insulin (Primary)  -     Microalbumin / Creatinine Urine Ratio - Urine, Clean Catch  -     gabapentin (NEURONTIN) 300 MG capsule; Take 1 capsule by mouth 2 (Two) Times a Day.  Dispense: 60 capsule; Refill: 2    2. Medication management  -     Urine Drug Screen - Urine, Clean Catch    3. Lumbar radiculopathy, chronic  -     DULoxetine (CYMBALTA) 60 MG capsule; Take 1 capsule by mouth Daily.  Dispense: 30 capsule; Refill: 2  -     gabapentin (NEURONTIN) 300 MG capsule; Take 1 capsule by mouth 2 (Two) Times a Day.  Dispense: 60 capsule; Refill: 2    4. Elevated serum creatinine  -     Basic Metabolic Panel       -- He presents for follow-up valuation.  He did have labs performed last week and his kidney function had  decreased.  We will recheck a BMP today to continue to monitor this.  I will also order a UACR to monitor this well.  We will continue his duloxetine as well as his gabapentin prescriptions today.The patient has read and signed the [facility] Controlled Substance Contract.  I will continue to see patient for regular follow up appointments.  They are well controlled on their medication.  PDMP reviewed today. The patient is aware of the potential for addiction and dependence.  UDS updated today.      Return in about 3 months (around 5/24/2025).           This document has been electronically signed by EDMOND Sr  February 24, 2025 09:52 EST    EDMOND Sr  [address] [place] [address] [phone] (office)    Part of this note may be an electronic transcription/translation of spoken language to printed text using the Dragon Dictation System.

## 2025-02-25 LAB
ALBUMIN UR-MCNC: <1.2 MG/DL
CREAT UR-MCNC: 49 MG/DL
MICROALBUMIN/CREAT UR: NORMAL MG/G{CREAT}

## 2025-02-25 NOTE — PROGRESS NOTES
Please let him know that his kidney function has improved some.  We will continue to monitor this and redraw at his next office visit.

## 2025-02-28 ENCOUNTER — TELEPHONE (OUTPATIENT)
Dept: FAMILY MEDICINE CLINIC | Facility: CLINIC | Age: 75
End: 2025-02-28
Payer: MEDICARE

## 2025-02-28 DIAGNOSIS — E11.40 TYPE 2 DIABETES MELLITUS WITH DIABETIC NEUROPATHY, WITH LONG-TERM CURRENT USE OF INSULIN: ICD-10-CM

## 2025-02-28 DIAGNOSIS — Z79.4 TYPE 2 DIABETES MELLITUS WITH DIABETIC NEUROPATHY, WITH LONG-TERM CURRENT USE OF INSULIN: ICD-10-CM

## 2025-02-28 DIAGNOSIS — M54.16 LUMBAR RADICULOPATHY, CHRONIC: ICD-10-CM

## 2025-04-01 NOTE — TELEPHONE ENCOUNTER
Caller: Beba Johnston    Relationship: Emergency Contact    Best call back number: [phone]    Requested Prescriptions:   Requested Prescriptions     Pending Prescriptions Disp Refills    glucose blood test strip 100 each 5     Sig: Use to test blood sugar 3 times a day & as needed        Pharmacy where request should be sent:    [facility] [id number] [place], [place] - [address] [facility] - [phone]  - [phone] FX       Last office visit with prescribing clinician: 12/2/2024   Last telemedicine visit with prescribing clinician: Visit date not found   Next office visit with prescribing clinician: 4/18/2025     Additional details provided by patient: PHARMACY IS NEEDING THE DIAGNOSTIC CODE FOR THIS REFILL    Does the patient have less than a 3 day supply:  [] Yes  [] No    Would you like a call back once the refill request has been completed: [] Yes [] No    If the office needs to give you a call back, can they leave a voicemail: [] Yes [] No    Brissa Cox Rep   04/01/25 12:10 EDT

## 2025-04-08 RX ORDER — FENOFIBRATE 134 MG/1
134 CAPSULE ORAL
Qty: 90 CAPSULE | Refills: 1 | Status: SHIPPED | OUTPATIENT
Start: 2025-04-08

## 2025-04-18 ENCOUNTER — OFFICE VISIT (OUTPATIENT)
Dept: ENDOCRINOLOGY | Facility: CLINIC | Age: 75
End: 2025-04-18
Payer: MEDICARE

## 2025-04-18 VITALS
WEIGHT: 199.6 LBS | SYSTOLIC BLOOD PRESSURE: 133 MMHG | DIASTOLIC BLOOD PRESSURE: 67 MMHG | OXYGEN SATURATION: 98 % | BODY MASS INDEX: 27.85 KG/M2 | HEART RATE: 57 BPM

## 2025-04-18 DIAGNOSIS — E11.65 TYPE 2 DIABETES MELLITUS WITH HYPERGLYCEMIA, WITH LONG-TERM CURRENT USE OF INSULIN: Primary | ICD-10-CM

## 2025-04-18 DIAGNOSIS — Z79.4 TYPE 2 DIABETES MELLITUS WITH HYPERGLYCEMIA, WITH LONG-TERM CURRENT USE OF INSULIN: Primary | ICD-10-CM

## 2025-04-18 LAB — GLUCOSE BLDC GLUCOMTR-MCNC: 139 MG/DL (ref 70–130)

## 2025-04-18 RX ORDER — INSULIN GLARGINE 100 [IU]/ML
35 INJECTION, SOLUTION SUBCUTANEOUS DAILY
Qty: 15 ML | Refills: 1 | Status: SHIPPED | OUTPATIENT
Start: 2025-04-18

## 2025-04-18 NOTE — PROGRESS NOTES
Chief Complaint   Patient presents with    Type 2 diabetes mellitus with hyperglycemia, with long-term        Referring Provider  No ref. provider found     HPI   Hector Johnston Jr. is a 75 y.o. male had concerns including Type 2 diabetes mellitus with hyperglycemia, with long-term.    T2DM.    He reports that he is overall doing well.  He denies any changes to his health at this time.  He is taking his meds regularly without missing doses. He is tolerating his medication without any adverse reactions.  He does report that his fasting blood sugars are ranging greater than 120.    Diabetes:  Diabetes was diagnosed 2004.  Complications include heart attack 04/2024.  Last ophtho exam was 2023.  Current medications for diabetes include Lantus 30 units QD, Metformin 1000 mg BID, Glimiperide 4 mg BID, Jardiance 25 mg QD.  Previous meds: Glipizide-switched to glimiperide, Jardiance-insurance issue  He checks his blood sugar 1 times per day.   Hypos:none    ACE/ARB:no, Statin: yes  Labs:  Lipid Panel:utd  ABDULAZIZ: utd    The following portions of the patient's history were reviewed and updated as appropriate: allergies, current medications, past family history, past medical history, past social history, past surgical history, and problem list.    Diet: he doesn't try to limit his diet.    Past Medical History:   Diagnosis Date    Bilateral pseudophakia 12/29/2020    Diabetes mellitus     Epiretinal membrane (ERM) of left eye 03/03/2021    Hernia, inguinal, left 04/21/2024    UNKNOWN    Hypertension     Myocardial infarction     Primary open-angle glaucoma 12/29/2020    Vitreous prolapse, left eye 03/03/2021     Past Surgical History:   Procedure Laterality Date    CARDIAC CATHETERIZATION N/A 05/04/2018    Procedure: Left Heart Cath;  Surgeon: Jose Dickerson MD;  Location: [facility] CATH INVASIVE LOCATION;  Service: Cardiovascular    CARDIAC CATHETERIZATION N/A 04/23/2024    Procedure: Left Heart Cath;  Surgeon: Gunjan Saleh,  "MD;  Location:  [facility];  Service: Cardiology;  Laterality: N/A;    CHOLECYSTECTOMY      CORONARY ANGIOPLASTY WITH STENT PLACEMENT      MOUTH SURGERY        Family History   Problem Relation Age of Onset    No Known Problems Father     No Known Problems Mother       Social History     Socioeconomic History    Marital status:    Tobacco Use    Smoking status: Never    Smokeless tobacco: Never   Vaping Use    Vaping status: Never Used   Substance and Sexual Activity    Alcohol use: No    Drug use: No    Sexual activity: Yes     Partners: Female      No Known Allergies   Current Outpatient Medications on File Prior to Visit   Medication Sig Dispense Refill    aspirin 81 MG EC tablet Take 1 tablet by mouth Every Morning.      atorvastatin (LIPITOR) 40 MG tablet Take 1 tablet by mouth Every Night. 90 tablet 1    brimonidine (ALPHAGAN P) 0.1 % solution ophthalmic solution Administer 1 drop into the left eye 2 (Two) Times a Day.      cholecalciferol (VITAMIN D3) 1.25 MG (29074 UT) capsule Take 1 capsule by mouth Every 7 (Seven) Days.      clopidogrel (Plavix) 75 MG tablet Take 1 tablet by mouth Daily. 30 tablet 6    dorzolamide (TRUSOPT) 2 % ophthalmic solution Administer 1 drop into the left eye 3 (Three) Times a Day.      DULoxetine (CYMBALTA) 60 MG capsule Take 1 capsule by mouth Daily. 30 capsule 2    empagliflozin (Jardiance) 25 MG tablet tablet Take 1 tablet by mouth Daily. 30 tablet 6    fenofibrate micronized (LOFIBRA) 134 MG capsule TAKE ONE CAPSULE BY MOUTH EVERY MORNING BEFORE BREAKFAST 90 capsule 1    gabapentin (NEURONTIN) 300 MG capsule Take 1 capsule by mouth 2 (Two) Times a Day. 60 capsule 2    glimepiride (AMARYL) 4 MG tablet Take 1 tablet by mouth 2 (Two) Times a Day. 180 tablet 3    Insulin Pen Needle (Pen Needles 3/16\") 31G X 5 MM misc Use 1 each Daily. 100 each 3    latanoprost (XALATAN) 0.005 % ophthalmic solution Administer 1 drop into the left eye Every Night.      " Loratadine (CLARITIN PO) Take 10 mg by mouth Daily.      metFORMIN (GLUCOPHAGE) 1000 MG tablet Take 1 tablet by mouth 2 (Two) Times a Day With Meals.      metoprolol tartrate (LOPRESSOR) 25 MG tablet Take 1 tablet by mouth Every 12 Hours. 180 tablet 2    Multiple Vitamins-Minerals (MULTIVITAMIN ADULT PO) Take 1 tablet by mouth Every Morning.      Saw Palmetto 450 MG capsule Take  by mouth.      timolol (TIMOPTIC) 0.5 % ophthalmic solution Administer 1 drop to both eyes 2 (Two) Times a Day.      TRUEplus Lancets 33G misc Use to test blood sugar 3 times a day & as needed 100 each 5    [DISCONTINUED] glucose blood test strip Use to test blood sugar 3 times a day & as needed 100 each 5    [DISCONTINUED] Lantus SoloStar 100 UNIT/ML injection pen Inject 30 Units under the skin into the appropriate area as directed Daily.       No current facility-administered medications on file prior to visit.        Review of Systems   Constitutional:  Positive for fatigue. Negative for unexpected weight gain and unexpected weight loss.   Eyes:  Positive for blurred vision.   Endocrine: Positive for polydipsia, polyphagia and polyuria.   Psychiatric/Behavioral:  Negative for sleep disturbance.    All other systems reviewed and are negative.    /67 (BP Location: Right arm, Patient Position: Sitting, Cuff Size: Small Adult)   Pulse 57   Wt 90.5 kg (199 lb 9.6 oz)   SpO2 98%   BMI 27.85 kg/m²      Physical Exam  Vitals reviewed.   Constitutional:       Appearance: Normal appearance.   Eyes:      Extraocular Movements: Extraocular movements intact.   Cardiovascular:      Rate and Rhythm: Normal rate.   Pulmonary:      Effort: Pulmonary effort is normal.   Neurological:      General: No focal deficit present.      Mental Status: He is alert and oriented to person, place, and time.   Psychiatric:         Mood and Affect: Mood normal.         Behavior: Behavior normal.         Thought Content: Thought content normal.          Judgment: Judgment normal.       CMP:  Lab Results   Component Value Date    Glucose 139 (A) 04/18/2025    Glucose, UA >=1000 mg/dL (3+) (A) 01/26/2025    BUN 23 02/24/2025    BUN/Creatinine Ratio 17.6 02/24/2025    Creatinine 1.31 (H) 02/24/2025    Creatinine, Urine 49.0 02/24/2025    Ketones, UA Negative 01/26/2025    CO2 22.9 02/24/2025    CO2 Content 23.5 01/17/2021    Calcium 9.4 02/24/2025    Albumin 4.1 02/19/2025    AST (SGOT) 26 02/19/2025    ALT (SGPT) 20 02/19/2025     Lipid Panel:  Lab Results   Component Value Date    CHOL 133 02/19/2025    TRIG 228 (H) 02/19/2025    HDL 27 (L) 02/19/2025    VLDL 38 02/19/2025    LDL 68 02/19/2025     HbA1c:  Lab Results   Component Value Date    HGBA1C 8.30 (H) 02/19/2025     Glucose:  Lab Results   Component Value Date    POCGLU 139 (A) 04/18/2025     Microalbumin:  Lab Results   Component Value Date    MALBCRERATIO  02/24/2025      Comment:      Unable to calculate     TSH:  Lab Results   Component Value Date    TSH 1.760 02/19/2025       Assessment and Plan    Diagnoses and all orders for this visit:    1. Type 2 diabetes mellitus with hyperglycemia, with long-term current use of insulin (Primary)  Assessment & Plan:  -Diabetes is above goal with A1c 8.3.  -Discussed dietary and exercise guidelines with patient.  -Discussed the importance of yearly eye exams.  -Discussed the importance of checking BG's regularly.    - Increase Lantus 35 units QD.   -Continue Metformin 1000 mg BID.  -Continue Glimiperide 4 mg BID.  -Continue Jardiance 25 mg QD. Discussed the medication's MOA and that it may cause more frequent urination particularly upon starting the medication. Take one tablet in the morning with or without food. Encouraged to drink plenty of water as to not get dehydrated especially in warmer weather or with activity. Also discussed there may be an increased incidence of UTIs or yeast infections and to monitor for signs/symptoms.  -S/S hypoglycemia reviewed with  Rule of 15's advised.  -Follow-up in 1 month with A1c due.    Orders:  -     POC Glucose, Blood    Other orders  -     Lantus SoloStar 100 UNIT/ML injection pen; Inject 35 Units under the skin into the appropriate area as directed Daily.  Dispense: 15 mL; Refill: 1  -     glucose blood test strip; Use to test blood sugar 3 times a day & as needed  Dispense: 100 each; Refill: 5         Return in about 4 weeks (around 5/16/2025) for Follow-up appointment, A1C. The patient was instructed to contact the clinic with any interval questions or concerns.        This document has been electronically signed by EDMOND Faye  April 18, 2025 09:00 EDT   Endocrinology    Please note that portions of this document were completed with a voice recognition program. Efforts were made to edit the dictations, but occasionally words are mis-transcribed.

## 2025-04-18 NOTE — ASSESSMENT & PLAN NOTE
-Diabetes is above goal with A1c 8.3.  -Discussed dietary and exercise guidelines with patient.  -Discussed the importance of yearly eye exams.  -Discussed the importance of checking BG's regularly.    - Increase Lantus 35 units QD.   -Continue Metformin 1000 mg BID.  -Continue Glimiperide 4 mg BID.  -Continue Jardiance 25 mg QD. Discussed the medication's MOA and that it may cause more frequent urination particularly upon starting the medication. Take one tablet in the morning with or without food. Encouraged to drink plenty of water as to not get dehydrated especially in warmer weather or with activity. Also discussed there may be an increased incidence of UTIs or yeast infections and to monitor for signs/symptoms.  -S/S hypoglycemia reviewed with Rule of 15's advised.  -Follow-up in 1 month with A1c due.

## 2025-04-22 ENCOUNTER — TELEPHONE (OUTPATIENT)
Dept: FAMILY MEDICINE CLINIC | Facility: CLINIC | Age: 75
End: 2025-04-22

## 2025-04-22 NOTE — TELEPHONE ENCOUNTER
Caller: SHAMEKA CHAVARRIA    Relationship: Child    Best call back number: [phone]     What is the best time to reach you: ANY    Who are you requesting to speak with (clinical staff, provider,  specific staff member): CLINICAL    What was the call regarding: DAUGHTER IS WORKING ON PATIENT'S MEDICATIONS AND WANTS TO GET A COMPLETE LIST OF THE MEDICATIONS HE SHOULD BE TAKING AND THE DOSE AND TIMES/FREQUENCY THAT HE SHOULD BE TAKING THEM.     PLEASE CALL WITH THE DETAILS.

## 2025-05-19 ENCOUNTER — OFFICE VISIT (OUTPATIENT)
Dept: ENDOCRINOLOGY | Facility: CLINIC | Age: 75
End: 2025-05-19
Payer: MEDICARE

## 2025-05-19 VITALS
DIASTOLIC BLOOD PRESSURE: 66 MMHG | BODY MASS INDEX: 27.68 KG/M2 | SYSTOLIC BLOOD PRESSURE: 122 MMHG | WEIGHT: 198.4 LBS | OXYGEN SATURATION: 98 % | HEART RATE: 56 BPM

## 2025-05-19 DIAGNOSIS — I21.4 NSTEMI (NON-ST ELEVATED MYOCARDIAL INFARCTION): ICD-10-CM

## 2025-05-19 DIAGNOSIS — Z79.4 TYPE 2 DIABETES MELLITUS WITH DIABETIC NEUROPATHY, WITH LONG-TERM CURRENT USE OF INSULIN: ICD-10-CM

## 2025-05-19 DIAGNOSIS — Z79.4 TYPE 2 DIABETES MELLITUS WITH HYPERGLYCEMIA, WITH LONG-TERM CURRENT USE OF INSULIN: Primary | ICD-10-CM

## 2025-05-19 DIAGNOSIS — E11.40 TYPE 2 DIABETES MELLITUS WITH DIABETIC NEUROPATHY, WITH LONG-TERM CURRENT USE OF INSULIN: ICD-10-CM

## 2025-05-19 DIAGNOSIS — E11.65 TYPE 2 DIABETES MELLITUS WITH HYPERGLYCEMIA, WITH LONG-TERM CURRENT USE OF INSULIN: Primary | ICD-10-CM

## 2025-05-19 LAB
EXPIRATION DATE: ABNORMAL
EXPIRATION DATE: NORMAL
GLUCOSE BLDC GLUCOMTR-MCNC: 101 MG/DL (ref 70–130)
HBA1C MFR BLD: 8 % (ref 4.5–5.7)
Lab: ABNORMAL
Lab: NORMAL

## 2025-05-19 RX ORDER — PEN NEEDLE, DIABETIC 30 GX3/16"
1 NEEDLE, DISPOSABLE MISCELLANEOUS DAILY
Qty: 100 EACH | Refills: 1 | Status: SHIPPED | OUTPATIENT
Start: 2025-05-19

## 2025-05-19 RX ORDER — INSULIN GLARGINE 100 [IU]/ML
35 INJECTION, SOLUTION SUBCUTANEOUS DAILY
Qty: 15 ML | Refills: 1 | Status: SHIPPED | OUTPATIENT
Start: 2025-05-19

## 2025-05-19 RX ORDER — SEMAGLUTIDE 0.68 MG/ML
0.25 INJECTION, SOLUTION SUBCUTANEOUS WEEKLY
Qty: 3 ML | Refills: 2 | Status: SHIPPED | OUTPATIENT
Start: 2025-05-19

## 2025-05-19 RX ORDER — GLIMEPIRIDE 4 MG/1
4 TABLET ORAL 2 TIMES DAILY
Qty: 180 TABLET | Refills: 3 | Status: SHIPPED | OUTPATIENT
Start: 2025-05-19

## 2025-05-19 NOTE — ASSESSMENT & PLAN NOTE
-Diabetes is improving with A1c 8.0.  -Discussed dietary and exercise guidelines with patient.  -Discussed the importance of yearly eye exams.  -Discussed the importance of checking BG's regularly.    - Continue Lantus 35 units QD.   -Continue Metformin 1000 mg BID.  -Continue Glimiperide 4 mg BID.  -Continue Jardiance 25 mg QD. Discussed the medication's MOA and that it may cause more frequent urination particularly upon starting the medication. Take one tablet in the morning with or without food. Encouraged to drink plenty of water as to not get dehydrated especially in warmer weather or with activity. Also discussed there may be an increased incidence of UTIs or yeast infections and to monitor for signs/symptoms.  -Start Ozempic 0.25 mg weekly.  Patient has no personal history of pancreatitis, no family history of MEN syndrome or medullary thyroid cancer. Possible side effects including nausea, bloating, other GI upset and rarely pancreatitis were discussed. He was advised to call the office with any symptoms or concerns.   -S/S hypoglycemia reviewed with Rule of 15's advised.  -Follow-up in 3 months.

## 2025-05-19 NOTE — PROGRESS NOTES
Chief Complaint   Patient presents with    Follow-up     Type 2 diabetes mellitus with hyperglycemia, with long-term current use of insulin            Referring Provider  No ref. provider found     HPI   Hector Johnston Jr. is a 75 y.o. male had concerns including Follow-up (Type 2 diabetes mellitus with hyperglycemia, with long-term current use of insulin//).    T2DM.    He reports that he is overall doing well.  He denies any changes to his health at this time.  He is taking his meds regularly without missing doses. He is tolerating his medication without any adverse reactions.  He does report that his fasting blood sugars are improving and are in the 100's.    Diabetes:  Diabetes was diagnosed 2004.  Complications include heart attack 04/2024.  Last ophtho exam was 2023.  Current medications for diabetes include Lantus 35 units QD, Metformin 1000 mg BID, Glimiperide 4 mg BID, Jardiance 25 mg QD.  Previous meds: Glipizide-switched to glimiperide, Jardiance-insurance issue  He checks his blood sugar 1 times per day.   Hypos:none    ACE/ARB:no, Statin: yes  Labs:  Lipid Panel:utd  ABDULAZIZ: utd    The following portions of the patient's history were reviewed and updated as appropriate: allergies, current medications, past family history, past medical history, past social history, past surgical history, and problem list.    Diet: he doesn't try to limit his diet.    Past Medical History:   Diagnosis Date    Bilateral pseudophakia 12/29/2020    Diabetes mellitus     Epiretinal membrane (ERM) of left eye 03/03/2021    Hernia, inguinal, left 04/21/2024    UNKNOWN    Hypertension     Myocardial infarction     Primary open-angle glaucoma 12/29/2020    Vitreous prolapse, left eye 03/03/2021     Past Surgical History:   Procedure Laterality Date    CARDIAC CATHETERIZATION N/A 05/04/2018    Procedure: Left Heart Cath;  Surgeon: Jose Dickerson MD;  Location: [facility] CATH INVASIVE LOCATION;  Service: Cardiovascular    CARDIAC  CATHETERIZATION N/A 04/23/2024    Procedure: Left Heart Cath;  Surgeon: Gunjan Saleh MD;  Location: [facility] CATH INVASIVE LOCATION;  Service: Cardiology;  Laterality: N/A;    CHOLECYSTECTOMY      CORONARY ANGIOPLASTY WITH STENT PLACEMENT      MOUTH SURGERY        Family History   Problem Relation Age of Onset    No Known Problems Father     No Known Problems Mother       Social History     Socioeconomic History    Marital status:    Tobacco Use    Smoking status: Never     Passive exposure: Never    Smokeless tobacco: Never   Vaping Use    Vaping status: Never Used   Substance and Sexual Activity    Alcohol use: No    Drug use: No    Sexual activity: Yes     Partners: Female      No Known Allergies   Current Outpatient Medications on File Prior to Visit   Medication Sig Dispense Refill    aspirin 81 MG EC tablet Take 1 tablet by mouth Every Morning.      atorvastatin (LIPITOR) 40 MG tablet Take 1 tablet by mouth Every Night. 90 tablet 1    brimonidine (ALPHAGAN P) 0.1 % solution ophthalmic solution Administer 1 drop into the left eye 2 (Two) Times a Day.      cholecalciferol (VITAMIN D3) 1.25 MG (10749 UT) capsule Take 1 capsule by mouth Every 7 (Seven) Days.      clopidogrel (Plavix) 75 MG tablet Take 1 tablet by mouth Daily. 30 tablet 6    dorzolamide (TRUSOPT) 2 % ophthalmic solution Administer 1 drop into the left eye 3 (Three) Times a Day.      DULoxetine (CYMBALTA) 60 MG capsule Take 1 capsule by mouth Daily. 30 capsule 2    empagliflozin (Jardiance) 25 MG tablet tablet Take 1 tablet by mouth Daily. 30 tablet 6    fenofibrate micronized (LOFIBRA) 134 MG capsule TAKE ONE CAPSULE BY MOUTH EVERY MORNING BEFORE BREAKFAST 90 capsule 1    gabapentin (NEURONTIN) 300 MG capsule Take 1 capsule by mouth 2 (Two) Times a Day. 60 capsule 2    glimepiride (AMARYL) 4 MG tablet Take 1 tablet by mouth 2 (Two) Times a Day. 180 tablet 3    glucose blood test strip Use to test blood sugar 3 times a day & as needed 100  "each 5    Insulin Pen Needle (Pen Needles 3/16\") 31G X 5 MM misc Use 1 each Daily. 100 each 3    Lantus SoloStar 100 UNIT/ML injection pen Inject 35 Units under the skin into the appropriate area as directed Daily. 15 mL 1    latanoprost (XALATAN) 0.005 % ophthalmic solution Administer 1 drop into the left eye Every Night.      Loratadine (CLARITIN PO) Take 10 mg by mouth Daily.      metFORMIN (GLUCOPHAGE) 1000 MG tablet Take 1 tablet by mouth 2 (Two) Times a Day With Meals.      metoprolol tartrate (LOPRESSOR) 25 MG tablet Take 1 tablet by mouth Every 12 Hours. 180 tablet 2    Multiple Vitamins-Minerals (MULTIVITAMIN ADULT PO) Take 1 tablet by mouth Every Morning.      Saw Palmetto 450 MG capsule Take  by mouth.      timolol (TIMOPTIC) 0.5 % ophthalmic solution Administer 1 drop to both eyes 2 (Two) Times a Day.      TRUEplus Lancets 33G misc Use to test blood sugar 3 times a day & as needed 100 each 5     No current facility-administered medications on file prior to visit.        Review of Systems   Constitutional:  Positive for fatigue. Negative for unexpected weight gain and unexpected weight loss.   Eyes:  Positive for blurred vision.   Endocrine: Positive for polydipsia, polyphagia and polyuria.   Psychiatric/Behavioral:  Negative for sleep disturbance.    All other systems reviewed and are negative.    /66 (BP Location: Right arm, Patient Position: Sitting, Cuff Size: Adult)   Pulse 56   Wt 90 kg (198 lb 6.4 oz)   SpO2 98%   BMI 27.68 kg/m²      Physical Exam  Vitals reviewed.   Constitutional:       Appearance: Normal appearance.   Eyes:      Extraocular Movements: Extraocular movements intact.   Cardiovascular:      Rate and Rhythm: Normal rate.   Pulmonary:      Effort: Pulmonary effort is normal.   Neurological:      General: No focal deficit present.      Mental Status: He is alert and oriented to person, place, and time.   Psychiatric:         Mood and Affect: Mood normal.         Behavior: " Behavior normal.         Thought Content: Thought content normal.         Judgment: Judgment normal.       CMP:  Lab Results   Component Value Date    Glucose 101 05/19/2025    Glucose, UA >=1000 mg/dL (3+) (A) 01/26/2025    BUN 23 02/24/2025    BUN/Creatinine Ratio 17.6 02/24/2025    Creatinine 1.31 (H) 02/24/2025    Creatinine, Urine 49.0 02/24/2025    Ketones, UA Negative 01/26/2025    CO2 22.9 02/24/2025    CO2 Content 23.5 01/17/2021    Calcium 9.4 02/24/2025    Albumin 4.1 02/19/2025    AST (SGOT) 26 02/19/2025    ALT (SGPT) 20 02/19/2025     Lipid Panel:  Lab Results   Component Value Date    CHOL 133 02/19/2025    TRIG 228 (H) 02/19/2025    HDL 27 (L) 02/19/2025    VLDL 38 02/19/2025    LDL 68 02/19/2025     HbA1c:  Lab Results   Component Value Date    HGBA1C 8.0 (A) 05/19/2025     Glucose:  Lab Results   Component Value Date    POCGLU 101 05/19/2025     Microalbumin:  Lab Results   Component Value Date    MALBCRERATIO  02/24/2025      Comment:      Unable to calculate     TSH:  Lab Results   Component Value Date    TSH 1.760 02/19/2025       Assessment and Plan    Diagnoses and all orders for this visit:    1. Type 2 diabetes mellitus with hyperglycemia, with long-term current use of insulin (Primary)  Assessment & Plan:  -Diabetes is improving with A1c 8.0.  -Discussed dietary and exercise guidelines with patient.  -Discussed the importance of yearly eye exams.  -Discussed the importance of checking BG's regularly.    - Continue Lantus 35 units QD.   -Continue Metformin 1000 mg BID.  -Continue Glimiperide 4 mg BID.  -Continue Jardiance 25 mg QD. Discussed the medication's MOA and that it may cause more frequent urination particularly upon starting the medication. Take one tablet in the morning with or without food. Encouraged to drink plenty of water as to not get dehydrated especially in warmer weather or with activity. Also discussed there may be an increased incidence of UTIs or yeast infections and to  monitor for signs/symptoms.  -Start Ozempic 0.25 mg weekly.  Patient has no personal history of pancreatitis, no family history of MEN syndrome or medullary thyroid cancer. Possible side effects including nausea, bloating, other GI upset and rarely pancreatitis were discussed. He was advised to call the office with any symptoms or concerns.   -S/S hypoglycemia reviewed with Rule of 15's advised.  -Follow-up in 3 months.    Orders:  -     POC Glycosylated Hemoglobin (Hb A1C)  -     POC Glucose, Blood    Other orders  -     Semaglutide,0.25 or 0.5MG/DOS, (Ozempic, 0.25 or 0.5 MG/DOSE,) 2 MG/3ML solution pen-injector; Inject 0.25 mg under the skin into the appropriate area as directed 1 (One) Time Per Week.  Dispense: 3 mL; Refill: 2         Return in about 3 months (around 8/19/2025) for Follow-up appointment, A1C. The patient was instructed to contact the clinic with any interval questions or concerns.        This document has been electronically signed by EDMOND Faye  May 19, 2025 10:25 EDT   Endocrinology    Please note that portions of this document were completed with a voice recognition program. Efforts were made to edit the dictations, but occasionally words are mis-transcribed.

## 2025-05-21 DIAGNOSIS — I25.10 ASCVD (ARTERIOSCLEROTIC CARDIOVASCULAR DISEASE): ICD-10-CM

## 2025-05-21 RX ORDER — ATORVASTATIN CALCIUM 40 MG/1
40 TABLET, FILM COATED ORAL NIGHTLY
Qty: 90 TABLET | Refills: 1 | OUTPATIENT
Start: 2025-05-21

## 2025-05-21 NOTE — TELEPHONE ENCOUNTER
Pt has to been seen in office before we can refill any medications. Pt was LS 4/2024. He may contact his PCP

## 2025-05-30 DIAGNOSIS — M54.16 LUMBAR RADICULOPATHY, CHRONIC: ICD-10-CM

## 2025-05-30 RX ORDER — DULOXETIN HYDROCHLORIDE 60 MG/1
60 CAPSULE, DELAYED RELEASE ORAL DAILY
Qty: 30 CAPSULE | Refills: 2 | Status: SHIPPED | OUTPATIENT
Start: 2025-05-30

## 2025-06-09 ENCOUNTER — TELEPHONE (OUTPATIENT)
Dept: FAMILY MEDICINE CLINIC | Facility: CLINIC | Age: 75
End: 2025-06-09

## 2025-06-09 DIAGNOSIS — Z79.4 TYPE 2 DIABETES MELLITUS WITH DIABETIC NEUROPATHY, WITH LONG-TERM CURRENT USE OF INSULIN: ICD-10-CM

## 2025-06-09 DIAGNOSIS — E11.40 TYPE 2 DIABETES MELLITUS WITH DIABETIC NEUROPATHY, WITH LONG-TERM CURRENT USE OF INSULIN: ICD-10-CM

## 2025-06-09 DIAGNOSIS — M54.16 LUMBAR RADICULOPATHY, CHRONIC: ICD-10-CM

## 2025-06-09 RX ORDER — GABAPENTIN 300 MG/1
300 CAPSULE ORAL 2 TIMES DAILY
Qty: 60 CAPSULE | Refills: 2 | Status: SHIPPED | OUTPATIENT
Start: 2025-06-09

## 2025-06-09 NOTE — TELEPHONE ENCOUNTER
Caller: JennaHector lawrence Jr.    Relationship: Self    Best call back number: [phone]    Requested Prescriptions:   Requested Prescriptions      No prescriptions requested or ordered in this encounter      gabapentin (NEURONTIN) 300 MG capsule     Pharmacy where request should be sent: [facility] [id number]  [place] - [address] AT [address] - [phone]  - [phone] FX     Last office visit with prescribing clinician: 2/24/2025   Last telemedicine visit with prescribing clinician: Visit date not found   Next office visit with prescribing clinician: 6/23/2025     Additional details provided by patient:     COMPLETELY OUT OF MEDICATION    Does the patient have less than a 3 day supply:  [x] Yes  [] No    Would you like a call back once the refill request has been completed: [] Yes [x] No    If the office needs to give you a call back, can they leave a voicemail: [] Yes [x] No    Mary Arrington, PCT   06/09/25 11:49 EDT

## 2025-06-23 ENCOUNTER — OFFICE VISIT (OUTPATIENT)
Dept: FAMILY MEDICINE CLINIC | Facility: CLINIC | Age: 75
End: 2025-06-23
Payer: MEDICARE

## 2025-06-23 ENCOUNTER — TELEPHONE (OUTPATIENT)
Dept: CARDIOLOGY | Facility: CLINIC | Age: 75
End: 2025-06-23
Payer: MEDICARE

## 2025-06-23 VITALS
HEART RATE: 61 BPM | BODY MASS INDEX: 28.03 KG/M2 | WEIGHT: 200.2 LBS | OXYGEN SATURATION: 98 % | DIASTOLIC BLOOD PRESSURE: 56 MMHG | TEMPERATURE: 98.2 F | HEIGHT: 71 IN | SYSTOLIC BLOOD PRESSURE: 102 MMHG

## 2025-06-23 DIAGNOSIS — E11.40 TYPE 2 DIABETES MELLITUS WITH DIABETIC NEUROPATHY, WITH LONG-TERM CURRENT USE OF INSULIN: ICD-10-CM

## 2025-06-23 DIAGNOSIS — R07.9 CHEST PAIN, UNSPECIFIED TYPE: ICD-10-CM

## 2025-06-23 DIAGNOSIS — Z00.00 MEDICARE ANNUAL WELLNESS VISIT, SUBSEQUENT: Primary | ICD-10-CM

## 2025-06-23 DIAGNOSIS — Z79.4 TYPE 2 DIABETES MELLITUS WITH DIABETIC NEUROPATHY, WITH LONG-TERM CURRENT USE OF INSULIN: ICD-10-CM

## 2025-06-23 DIAGNOSIS — M54.16 LUMBAR RADICULOPATHY, CHRONIC: ICD-10-CM

## 2025-06-23 DIAGNOSIS — E78.1 PURE HYPERTRIGLYCERIDEMIA: ICD-10-CM

## 2025-06-23 PROCEDURE — 1125F AMNT PAIN NOTED PAIN PRSNT: CPT | Performed by: NURSE PRACTITIONER

## 2025-06-23 PROCEDURE — 1170F FXNL STATUS ASSESSED: CPT | Performed by: NURSE PRACTITIONER

## 2025-06-23 PROCEDURE — 93005 ELECTROCARDIOGRAM TRACING: CPT | Performed by: NURSE PRACTITIONER

## 2025-06-23 PROCEDURE — G0439 PPPS, SUBSEQ VISIT: HCPCS | Performed by: NURSE PRACTITIONER

## 2025-06-23 PROCEDURE — 3052F HG A1C>EQUAL 8.0%<EQUAL 9.0%: CPT | Performed by: NURSE PRACTITIONER

## 2025-06-23 RX ORDER — FENOFIBRATE 134 MG/1
134 CAPSULE ORAL
Qty: 90 CAPSULE | Refills: 1 | Status: SHIPPED | OUTPATIENT
Start: 2025-06-23

## 2025-06-23 RX ORDER — GABAPENTIN 300 MG/1
300 CAPSULE ORAL 2 TIMES DAILY
Qty: 60 CAPSULE | Refills: 2 | Status: SHIPPED | OUTPATIENT
Start: 2025-06-23

## 2025-06-23 RX ORDER — DULOXETIN HYDROCHLORIDE 60 MG/1
60 CAPSULE, DELAYED RELEASE ORAL DAILY
Qty: 30 CAPSULE | Refills: 2 | Status: SHIPPED | OUTPATIENT
Start: 2025-06-23

## 2025-06-23 RX ORDER — SODIUM BICARBONATE 325 MG/1
325 TABLET ORAL DAILY
Qty: 90 TABLET | Refills: 1 | Status: SHIPPED | OUTPATIENT
Start: 2025-06-23

## 2025-06-23 NOTE — PROGRESS NOTES
Subjective   The ABCs of the Annual Wellness Visit  Medicare Wellness Visit      Hector Johnston Jr. is a 75 y.o. patient who presents for a Medicare Wellness Visit.    The following portions of the patient's history were reviewed and   updated as appropriate: allergies, current medications, past family history, past medical history, past social history, past surgical history, and problem list.    Compared to one year ago, the patient's physical   health is the same.  Compared to one year ago, the patient's mental   health is the same.    Recent Hospitalizations:  He was not admitted to the hospital during the last year.     Current Medical Providers:  Patient Care Team:  Petr Crowley APRN as PCP - General (Nurse Practitioner)  Chyna Crowley APRN as Nurse Practitioner (Nurse Practitioner)    Outpatient Medications Prior to Visit   Medication Sig Dispense Refill    aspirin 81 MG EC tablet Take 1 tablet by mouth Every Morning.      atorvastatin (LIPITOR) 40 MG tablet Take 1 tablet by mouth Every Night. 90 tablet 1    brimonidine (ALPHAGAN P) 0.1 % solution ophthalmic solution Administer 1 drop into the left eye 2 (Two) Times a Day.      cholecalciferol (VITAMIN D3) 1.25 MG (62186 UT) capsule Take 1 capsule by mouth Every 7 (Seven) Days.      clopidogrel (Plavix) 75 MG tablet Take 1 tablet by mouth Daily. 30 tablet 6    dorzolamide (TRUSOPT) 2 % ophthalmic solution Administer 1 drop into the left eye 3 (Three) Times a Day.      empagliflozin (Jardiance) 25 MG tablet tablet Take 1 tablet by mouth Daily. 30 tablet 6    glimepiride (AMARYL) 4 MG tablet Take 1 tablet by mouth 2 (Two) Times a Day. 180 tablet 3    glucose blood test strip Use to test blood sugar 3 times a day & as needed 100 each 5    Insulin Pen Needle (Pen Needles) 31G X 5 MM misc Use 1 each Daily. 100 each 1    Lantus SoloStar 100 UNIT/ML injection pen Inject 35 Units under the skin into the appropriate area as directed Daily. 15 mL 1     Voicemail message left for patient with office address, arrival time, instructed to bring insurance card and photo id.    latanoprost (XALATAN) 0.005 % ophthalmic solution Administer 1 drop into the left eye Every Night.      Loratadine (CLARITIN PO) Take 10 mg by mouth Daily.      metFORMIN (GLUCOPHAGE) 1000 MG tablet Take 1 tablet by mouth 2 (Two) Times a Day With Meals. 180 tablet 1    metoprolol tartrate (LOPRESSOR) 25 MG tablet Take 1 tablet by mouth Every 12 Hours. 180 tablet 2    Multiple Vitamins-Minerals (MULTIVITAMIN ADULT PO) Take 1 tablet by mouth Every Morning.      Saw Palmetto 450 MG capsule Take  by mouth.      Semaglutide,0.25 or 0.5MG/DOS, (Ozempic, 0.25 or 0.5 MG/DOSE,) 2 MG/3ML solution pen-injector Inject 0.25 mg under the skin into the appropriate area as directed 1 (One) Time Per Week. 3 mL 2    timolol (TIMOPTIC) 0.5 % ophthalmic solution Administer 1 drop to both eyes 2 (Two) Times a Day.      TRUEplus Lancets 33G misc Use to test blood sugar 3 times a day & as needed 100 each 5    DULoxetine (CYMBALTA) 60 MG capsule TAKE 1 CAPSULE BY MOUTH DAILY 30 capsule 2    fenofibrate micronized (LOFIBRA) 134 MG capsule TAKE ONE CAPSULE BY MOUTH EVERY MORNING BEFORE BREAKFAST 90 capsule 1    gabapentin (NEURONTIN) 300 MG capsule Take 1 capsule by mouth 2 (Two) Times a Day. 60 capsule 2     No facility-administered medications prior to visit.     No opioid medication identified on active medication list. I have reviewed chart for other potential  high risk medication/s and harmful drug interactions in the elderly.      Aspirin is on active medication list. Aspirin use is indicated based on review of current medical condition/s. Pros and cons of this therapy have been discussed today. Benefits of this medication outweigh potential harm.  Patient has been encouraged to continue taking this medication.  .      Patient Active Problem List   Diagnosis    Hypogonadism in male    Corporo-venous occlusive erectile dysfunction    Abnormal cardiovascular stress test    Bilateral pseudophakia    Hernia, inguinal, left    Vitreous  "prolapse, left eye    Primary open-angle glaucoma    Epiretinal membrane (ERM) of left eye    NSTEMI (non-ST elevated myocardial infarction)    Type 2 diabetes mellitus with hyperglycemia, with long-term current use of insulin    ASCVD (arteriosclerotic cardiovascular disease)    Dyslipidemia     Advance Care Planning Advance Directive is not on file.  ACP discussion was declined by the patient. Patient does not have an advance directive, declines further assistance.            Objective   Vitals:    25 0846   BP: 102/56   BP Location: Left arm   Patient Position: Sitting   Cuff Size: Adult   Pulse: 61   Temp: 98.2 °F (36.8 °C)   TempSrc: Oral   SpO2: 98%   Weight: 90.8 kg (200 lb 3.2 oz)   Height: 180.3 cm (70.98\")   PainSc: 4    PainLoc: Leg       Estimated body mass index is 27.93 kg/m² as calculated from the following:    Height as of this encounter: 180.3 cm (70.98\").    Weight as of this encounter: 90.8 kg (200 lb 3.2 oz).                Does the patient have evidence of cognitive impairment? No  Lab Results   Component Value Date    HGBA1C 8.0 (A) 2025                                                                                                Health  Risk Assessment    Smoking Status:  Social History     Tobacco Use   Smoking Status Never    Passive exposure: Never   Smokeless Tobacco Never     Alcohol Consumption:  Social History     Substance and Sexual Activity   Alcohol Use No       Fall Risk Screen  STEADI Fall Risk Assessment was completed, and patient is at LOW risk for falls.Assessment completed on:2025    Depression Screening   Little interest or pleasure in doing things? Not at all   Feeling down, depressed, or hopeless? Not at all   PHQ-2 Total Score 0      Health Habits and Functional and Cognitive Screenin/23/2025     8:49 AM   Functional & Cognitive Status   Do you have difficulty preparing food and eating? No   Do you have difficulty bathing yourself, getting " dressed or grooming yourself? No   Do you have difficulty using the toilet? No   Do you have difficulty moving around from place to place? No   Do you have trouble with steps or getting out of a bed or a chair? No   Current Diet Limited Junk Food   Dental Exam Up to date   Eye Exam Up to date   Exercise (times per week) 5 times per week   Current Exercises Include Other   Do you need help using the phone?  No   Are you deaf or do you have serious difficulty hearing?  Yes   Do you need help to go to places out of walking distance? No   Do you need help shopping? No   Do you need help preparing meals?  No   Do you need help with housework?  No   Do you need help with laundry? No   Do you need help taking your medications? No   Do you need help managing money? No   Do you ever drive or ride in a car without wearing a seat belt? No   Have you felt unusual stress, anger or loneliness in the last month? No   Who do you live with? Spouse   If you need help, do you have trouble finding someone available to you? No   Have you been bothered in the last four weeks by sexual problems? No   Do you have difficulty concentrating, remembering or making decisions? No           Age-appropriate Screening Schedule:  Refer to the list below for future screening recommendations based on patient's age, sex and/or medical conditions. Orders for these recommended tests are listed in the plan section. The patient has been provided with a written plan.    Health Maintenance List  Health Maintenance   Topic Date Due    COVID-19 Vaccine (3 - 2024-25 season) 09/01/2024    INFLUENZA VACCINE  07/01/2025    DIABETIC FOOT EXAM  09/19/2025    HEMOGLOBIN A1C  11/19/2025    DIABETIC EYE EXAM  12/16/2025    LIPID PANEL  02/19/2026    URINE MICROALBUMIN-CREATININE RATIO (uACR)  02/25/2026    ANNUAL WELLNESS VISIT  06/23/2026    COLORECTAL CANCER SCREENING  12/03/2027    TDAP/TD VACCINES (2 - Td or Tdap) 06/03/2029    HEPATITIS C SCREENING  Completed     Pneumococcal Vaccine 50+  Completed    ZOSTER VACCINE  Completed                                                                                                                                                CMS Preventative Services Quick Reference  Risk Factors Identified During Encounter  Glaucoma or Family History of Glaucoma:  Ophthalmology Appointment Recommended  Hearing Problem: he currently sees audiology.   Immunizations Discussed/Encouraged: COVID19  Inactivity/Sedentary: Patient was advised to exercise at least 150 minutes a week per CDC recommendations.  Dental Screening Recommended    The above risks/problems have been discussed with the patient.  Pertinent information has been shared with the patient in the After Visit Summary.  An After Visit Summary and PPPS were made available to the patient.    Follow Up:   Next Medicare Wellness visit to be scheduled in 1 year.         Additional E&M Note during same encounter follows:  Patient has additional, significant, and separately identifiable condition(s)/problem(s) that require work above and beyond the Medicare Wellness Visit     Chief Complaint  Medicare Wellness-subsequent and Chest Pain (Pt complains of having chest pain on the left side of the chest that radiates down the left side of the arm over the weekend. )    Subjective   He presents today for annual wellness visit.  He has also been having some intermittent chest pain.  He states over the weekend it happened multiple times.  It seems to be improving.  He does not have any chest pain while sitting in office today.    He also states he needs updated prescriptions of his gabapentin, fenofibrate, and duloxetine.  He is been utilizing these medications for some time with benefit without side effect.      Hector is also being seen today for additional medical problem/s.    Review of Systems   Cardiovascular:  Positive for chest pain.              Objective   Vital Signs:  /56 (BP Location:  "Left arm, Patient Position: Sitting, Cuff Size: Adult)   Pulse 61   Temp 98.2 °F (36.8 °C) (Oral)   Ht 180.3 cm (70.98\")   Wt 90.8 kg (200 lb 3.2 oz)   SpO2 98%   BMI 27.93 kg/m²   Physical Exam  Constitutional:       Appearance: Normal appearance.   HENT:      Right Ear: External ear normal.      Left Ear: External ear normal.      Nose: Nose normal.      Mouth/Throat:      Mouth: Mucous membranes are moist.   Eyes:      Pupils: Pupils are equal, round, and reactive to light.   Cardiovascular:      Rate and Rhythm: Normal rate and regular rhythm.      Pulses: Normal pulses.   Pulmonary:      Effort: Pulmonary effort is normal.   Abdominal:      Palpations: Abdomen is soft.   Musculoskeletal:         General: Normal range of motion.   Skin:     General: Skin is warm.   Neurological:      General: No focal deficit present.      Mental Status: He is alert and oriented to person, place, and time.   Psychiatric:         Mood and Affect: Mood normal.         Behavior: Behavior normal.         The following data was reviewed by: EDMOND Sr on 06/23/2025:  Data reviewed : Cardiology studies EKG performed in office today.   Common labs          2/19/2025    11:00 2/24/2025    09:08 2/24/2025    09:09 5/19/2025    08:14   Common Labs   Glucose 206  235      BUN 19  23      Creatinine 1.62  1.31      Sodium 140  140      Potassium 5.1  4.6      Chloride 107  105      Calcium 9.8  9.4      Albumin 4.1       Total Bilirubin 0.3       Alkaline Phosphatase 60       AST (SGOT) 26       ALT (SGPT) 20       WBC 6.89       Hemoglobin 14.6       Hematocrit 43.7       Platelets 210       Total Cholesterol 133       Triglycerides 228       HDL Cholesterol 27       LDL Cholesterol  68       Hemoglobin A1C 8.30    8.0    Microalbumin, Urine   <1.2            Assessment and Plan      Medicare annual wellness visit, subsequent         Chest pain, unspecified type    Orders:    ECG 12 Lead    Lumbar radiculopathy, " chronic    Orders:    DULoxetine (CYMBALTA) 60 MG capsule; Take 1 capsule by mouth Daily.    gabapentin (NEURONTIN) 300 MG capsule; Take 1 capsule by mouth 2 (Two) Times a Day.    Type 2 diabetes mellitus with diabetic neuropathy, with long-term current use of insulin  Diabetes is stable.   Continue current treatment regimen.  Diabetes will be reassessed in 6 months    Orders:    gabapentin (NEURONTIN) 300 MG capsule; Take 1 capsule by mouth 2 (Two) Times a Day.    Pure hypertriglyceridemia   Lipid abnormalities are stable    Plan:  Continue same medication/s without change.      Discussed medication dosage, use, side effects, and goals of treatment in detail.    Counseled patient on lifestyle modifications to help control hyperlipidemia.     Patient Treatment Goals:   LDL goal is less than 70    Followup in 6 months.    Orders:    fenofibrate micronized (LOFIBRA) 134 MG capsule; Take 1 capsule by mouth Every Morning Before Breakfast.     --He presents today with annual wellness visit.  He also states he needs refills on his current medications.  I will send them in updated prescriptions for his duloxetine, fenofibrate, and gabapentin.The patient has read and signed the [facility] Controlled Substance Contract.  I will continue to see patient for regular follow up appointments.  They are well controlled on their medication.  PDMP reviewed today. The patient is aware of the potential for addiction and dependence.      -- He is also been having intermittent chest pain.  EKG in office showed sinus bradycardia.  He previously followed up with cardiology at [facility].  I have encouraged him to make an appointment to get back in with them.  He verbalized understanding.  I gave him the phone number to their clinic he states he will call them today.    Follow Up   Return in about 3 months (around 9/23/2025).  Patient was given instructions and counseling regarding his condition or for health maintenance  advice. Please see specific information pulled into the AVS if appropriate.

## 2025-06-23 NOTE — TELEPHONE ENCOUNTER
Spoke with patient. Advised him to go to ER if he is having chest pains. Made him a follow up appt.

## 2025-06-23 NOTE — TELEPHONE ENCOUNTER
Caller: Hector Johnston Jr.    Relationship to patient: Self    Best call back number: [phone]    Chief complaint: PT JUST CAME FROM HIS PCP, HAVING CHEST PAINS, PCP INSTRUCTED HIM TO COME TO US.     Type of visit: FU    Requested date: ASAP, NEEDS EARLIEST APPT.

## 2025-07-16 ENCOUNTER — OFFICE VISIT (OUTPATIENT)
Dept: CARDIOLOGY | Facility: CLINIC | Age: 75
End: 2025-07-16
Payer: MEDICARE

## 2025-07-16 VITALS
WEIGHT: 201.6 LBS | DIASTOLIC BLOOD PRESSURE: 62 MMHG | HEART RATE: 58 BPM | SYSTOLIC BLOOD PRESSURE: 106 MMHG | HEIGHT: 71 IN | BODY MASS INDEX: 28.22 KG/M2 | OXYGEN SATURATION: 99 %

## 2025-07-16 DIAGNOSIS — E78.5 DYSLIPIDEMIA: ICD-10-CM

## 2025-07-16 DIAGNOSIS — I25.10 ASCVD (ARTERIOSCLEROTIC CARDIOVASCULAR DISEASE): Primary | ICD-10-CM

## 2025-07-16 NOTE — PROGRESS NOTES
[facility]             YingEDMOND Sorensen Adam, APRN  Hecotr Johnston JrYajaira  1950 07/16/2025    Patient Active Problem List   Diagnosis    Hypogonadism in male    Corporo-venous occlusive erectile dysfunction    Abnormal cardiovascular stress test    Bilateral pseudophakia    Hernia, inguinal, left    Vitreous prolapse, left eye    Primary open-angle glaucoma    Epiretinal membrane (ERM) of left eye    NSTEMI (non-ST elevated myocardial infarction)    Type 2 diabetes mellitus with hyperglycemia, with long-term current use of insulin    ASCVD (arteriosclerotic cardiovascular disease)    Dyslipidemia       Dear Petr Crowley APRN:    Subjective     Chief Complaint   Patient presents with    Follow-up     HPI:     This is a 75 y.o. male with known past medical history of diabetes mellitus type 2, coronary disease status post PCI to the LAD in April 2024, essential hypertension.     Hector Johnston Jr. presents today for routine cardiology follow up.   History of Present Illness  He reports no new symptoms or concerns.. He has been managing well with his current medications and has not required any hospitalizations since his stent placement in 04/2024. A few weeks ago, he experienced a single episode of sharp chest pain that lasted intermittently for a day. This occurred while he was at rest and did not necessitate an ER visit or medication intervention. He has not had any further episodes since then.     He is currently on metoprolol, atorvastatin, aspirin, and Plavix. He recalls being switched from Brilinta to another medication. He has an upcoming appointment with a physician on 09/22/2025. No recent lab work on file.     Diagnostic Testing  Echocardiogram 4/2024: EF 61 to 65%  Left heart catheterization 4/2024: PCI to the LAD x        All other systems were reviewed and were negative.    Patient Active Problem List   Diagnosis    Hypogonadism in male     Corporo-venous occlusive erectile dysfunction    Abnormal cardiovascular stress test    Bilateral pseudophakia    Hernia, inguinal, left    Vitreous prolapse, left eye    Primary open-angle glaucoma    Epiretinal membrane (ERM) of left eye    NSTEMI (non-ST elevated myocardial infarction)    Type 2 diabetes mellitus with hyperglycemia, with long-term current use of insulin    ASCVD (arteriosclerotic cardiovascular disease)    Dyslipidemia       family history includes No Known Problems in his father and mother.     reports that he has never smoked. He has never been exposed to tobacco smoke. He has never used smokeless tobacco. He reports that he does not drink alcohol and does not use drugs.    No Known Allergies      Current Outpatient Medications:     aspirin 81 MG EC tablet, Take 1 tablet by mouth Every Morning., Disp: , Rfl:     atorvastatin (LIPITOR) 40 MG tablet, Take 1 tablet by mouth Every Night., Disp: 90 tablet, Rfl: 1    brimonidine (ALPHAGAN P) 0.1 % solution ophthalmic solution, Administer 1 drop into the left eye 2 (Two) Times a Day., Disp: , Rfl:     cholecalciferol (VITAMIN D3) 1.25 MG (17117 UT) capsule, Take 1 capsule by mouth Every 7 (Seven) Days., Disp: , Rfl:     dorzolamide (TRUSOPT) 2 % ophthalmic solution, Administer 1 drop into the left eye 3 (Three) Times a Day., Disp: , Rfl:     DULoxetine (CYMBALTA) 60 MG capsule, Take 1 capsule by mouth Daily., Disp: 30 capsule, Rfl: 2    empagliflozin (Jardiance) 25 MG tablet tablet, Take 1 tablet by mouth Daily., Disp: 30 tablet, Rfl: 6    fenofibrate micronized (LOFIBRA) 134 MG capsule, Take 1 capsule by mouth Every Morning Before Breakfast., Disp: 90 capsule, Rfl: 1    gabapentin (NEURONTIN) 300 MG capsule, Take 1 capsule by mouth 2 (Two) Times a Day., Disp: 60 capsule, Rfl: 2    glimepiride (AMARYL) 4 MG tablet, Take 1 tablet by mouth 2 (Two) Times a Day., Disp: 180 tablet, Rfl: 3    glucose blood test strip, Use to test blood sugar 3 times a day &  as needed, Disp: 100 each, Rfl: 5    Insulin Pen Needle (Pen Needles) 31G X 5 MM misc, Use 1 each Daily., Disp: 100 each, Rfl: 1    Lantus SoloStar 100 UNIT/ML injection pen, Inject 35 Units under the skin into the appropriate area as directed Daily., Disp: 15 mL, Rfl: 1    latanoprost (XALATAN) 0.005 % ophthalmic solution, Administer 1 drop into the left eye Every Night., Disp: , Rfl:     Loratadine (CLARITIN PO), Take 10 mg by mouth Daily., Disp: , Rfl:     metFORMIN (GLUCOPHAGE) 1000 MG tablet, Take 1 tablet by mouth 2 (Two) Times a Day With Meals., Disp: 180 tablet, Rfl: 1    metoprolol tartrate (LOPRESSOR) 25 MG tablet, Take 1 tablet by mouth Every 12 Hours., Disp: 180 tablet, Rfl: 2    Multiple Vitamins-Minerals (MULTIVITAMIN ADULT PO), Take 1 tablet by mouth Every Morning., Disp: , Rfl:     Saw Palmetto 450 MG capsule, Take  by mouth., Disp: , Rfl:     Semaglutide,0.25 or 0.5MG/DOS, (Ozempic, 0.25 or 0.5 MG/DOSE,) 2 MG/3ML solution pen-injector, Inject 0.25 mg under the skin into the appropriate area as directed 1 (One) Time Per Week., Disp: 3 mL, Rfl: 2    sodium bicarbonate 325 MG tablet, Take 1 tablet by mouth Daily., Disp: 90 tablet, Rfl: 1    timolol (TIMOPTIC) 0.5 % ophthalmic solution, Administer 1 drop to both eyes 2 (Two) Times a Day., Disp: , Rfl:     TRUEplus Lancets 33G misc, Use to test blood sugar 3 times a day & as needed, Disp: 100 each, Rfl: 5      Physical Exam:  I have reviewed the patient's current vital signs as documented in the patient's EMR.   Vitals:    07/16/25 0829   BP: 106/62   Pulse: 58   SpO2: 99%     Body mass index is 28.13 kg/m².       07/16/25 0829   Weight: 91.4 kg (201 lb 9.6 oz)      Physical Exam  Constitutional:       General: He is not in acute distress.     Appearance: Normal appearance. He is well-developed.   HENT:      Head: Normocephalic and atraumatic.      Mouth/Throat:      Mouth: Mucous membranes are moist.   Eyes:      Extraocular Movements: Extraocular  movements intact.      Pupils: Pupils are equal, round, and reactive to light.   Neck:      Vascular: No JVD.   Cardiovascular:      Rate and Rhythm: Normal rate and regular rhythm.      Heart sounds: Normal heart sounds. No murmur heard.     No S3 or S4 sounds.   Pulmonary:      Effort: Pulmonary effort is normal. No respiratory distress.      Breath sounds: Normal breath sounds. No wheezing.   Abdominal:      General: Bowel sounds are normal. There is no distension.      Palpations: Abdomen is soft. There is no hepatomegaly.      Tenderness: There is no abdominal tenderness.   Musculoskeletal:         General: Normal range of motion.      Cervical back: Normal range of motion and neck supple.   Skin:     General: Skin is warm and dry.      Coloration: Skin is not jaundiced or pale.   Neurological:      General: No focal deficit present.      Mental Status: He is alert and oriented to person, place, and time. Mental status is at baseline.   Psychiatric:         Mood and Affect: Mood normal.         Behavior: Behavior normal.         Thought Content: Thought content normal.         Judgment: Judgment normal.            DATA REVIEWED:     TTE/EMMA:  Results for orders placed during the hospital encounter of 04/21/24    Adult Transthoracic Echo Complete W/ Cont if Necessary Per Protocol    Interpretation Summary    Normal left ventricular cavity size and wall thickness noted. All left ventricular wall segments contract normally.    Left ventricular ejection fraction appears to be 61 - 65%.    Left ventricular diastolic function is consistent with (grade I) impaired relaxation.    The aortic valve is abnormal in structure. The aortic valve exhibits sclerosis. The aortic valve appears trileaflet. Mild aortic valve regurgitation is present. No aortic valve stenosis is present.    The mitral valve is structurally normal with no regurgitation or significant stenosis present.    There is no evidence of pericardial effusion.  ".      Laboratory evaluations:    Lab Results   Component Value Date    GLUCOSE 235 (H) 02/24/2025    BUN 23 02/24/2025    CREATININE 1.31 (H) 02/24/2025    EGFRIFNONA 55 (L) 02/11/2022    BCR 17.6 02/24/2025    K 4.6 02/24/2025    CO2 22.9 02/24/2025    CALCIUM 9.4 02/24/2025    ALBUMIN 4.1 02/19/2025    AST 26 02/19/2025    ALT 20 02/19/2025     Lab Results   Component Value Date    WBC 6.89 02/19/2025    HGB 14.6 02/19/2025    HCT 43.7 02/19/2025    MCV 88.8 02/19/2025     02/19/2025     Lab Results   Component Value Date    CHOL 133 02/19/2025    TRIG 228 (H) 02/19/2025    HDL 27 (L) 02/19/2025    LDL 68 02/19/2025     Lab Results   Component Value Date    TSH 1.760 02/19/2025     Lab Results   Component Value Date    HGBA1C 8.0 (A) 05/19/2025     Lab Results   Component Value Date    ALT 20 02/19/2025     Lab Results   Component Value Date    HGBA1C 8.0 (A) 05/19/2025    HGBA1C 8.30 (H) 02/19/2025    HGBA1C 7.7 (A) 12/02/2024     Lab Results   Component Value Date    MICROALBUR <1.2 02/24/2025    CREATININE 1.31 (H) 02/24/2025     Lab Results   Component Value Date    FERRITIN 290.40 01/17/2021     Lab Results   Component Value Date    INR 0.86 (L) 04/21/2024    PROTIME 12.2 04/21/2024      No components found for: \"ABSOLUTELUNG\"    --------------------------------------------------------------------------------------------------------------------------    ASSESSMENT/PLAN:      Diagnosis Plan   1. ASCVD (arteriosclerotic cardiovascular disease)        2. Dyslipidemia          Assessment & Plan  1. Chest pain  2. ASCVD  - Reported experiencing sharp chest pain a couple of weeks ago, lasting intermittently for a day. No further chest pain. Patient would like to continue to monitor. Advised him to seek care in the ED if chest pain worsens.   - Patient had PCI x 2 to the LAD in April 2024.  Given it has been a year we will stop Plavix and continue with aspirin indefinitely.    3. Elevated blood glucose " levels  - Blood glucose level was 8.3 in 02/2025.  - Advised to see primary care physician to manage blood glucose levels effectively.  - Has an appointment scheduled with primary care physician on 09/22/2025.  - Emphasized the importance of controlling blood sugar     4.  Dyslipidemia  - -Lipid panel in February 2025 showed LDL at goal.  Patient follow-up with PCP in September for repeat lab work.  LDL goal less than 70      This document has been @Electronically signed by EDMOND Carlos, 07/16/25, 8:26 AM EDT.     Advance Care Planning   ACP discussion was declined by the patient. Patient does not have an advance directive, declines further assistance.      Dictated Utilizing Dragon Dictation: Part of this note may be an electronic transcription/translation of spoken language to printed text using the Dragon Dictation System.Patient or patient representative verbalized consent for the use of Ambient Listening during the visit with  EDMOND Carlos for chart documentation. 7/16/2025  08:51 EDT    Follow-up appointment and medication changes provided in hand delivered After Visit Summary as well as reviewed in the room.

## 2025-08-18 ENCOUNTER — TELEPHONE (OUTPATIENT)
Dept: FAMILY MEDICINE CLINIC | Facility: CLINIC | Age: 75
End: 2025-08-18
Payer: MEDICARE

## 2025-08-19 ENCOUNTER — OFFICE VISIT (OUTPATIENT)
Dept: ENDOCRINOLOGY | Facility: CLINIC | Age: 75
End: 2025-08-19
Payer: MEDICARE

## 2025-08-19 VITALS
SYSTOLIC BLOOD PRESSURE: 113 MMHG | HEART RATE: 60 BPM | DIASTOLIC BLOOD PRESSURE: 64 MMHG | OXYGEN SATURATION: 96 % | WEIGHT: 197.4 LBS | BODY MASS INDEX: 27.54 KG/M2

## 2025-08-19 DIAGNOSIS — E11.65 TYPE 2 DIABETES MELLITUS WITH HYPERGLYCEMIA, WITH LONG-TERM CURRENT USE OF INSULIN: Primary | ICD-10-CM

## 2025-08-19 DIAGNOSIS — Z79.4 TYPE 2 DIABETES MELLITUS WITH HYPERGLYCEMIA, WITH LONG-TERM CURRENT USE OF INSULIN: Primary | ICD-10-CM

## 2025-08-19 LAB
EXPIRATION DATE: ABNORMAL
GLUCOSE BLDC GLUCOMTR-MCNC: 221 MG/DL (ref 70–130)
HBA1C MFR BLD: 8.1 % (ref 4.5–5.7)
Lab: ABNORMAL

## 2025-08-22 RX ORDER — DASIGLUCAGON 0.6 MG/.6ML
0.6 INJECTION, SOLUTION SUBCUTANEOUS AS NEEDED
Qty: 1.2 ML | Refills: 2 | Status: SHIPPED | OUTPATIENT
Start: 2025-08-22

## 2025-08-23 ENCOUNTER — TRANSCRIBE ORDERS (OUTPATIENT)
Dept: LAB | Facility: HOSPITAL | Age: 75
End: 2025-08-23
Payer: MEDICARE

## 2025-08-23 ENCOUNTER — LAB (OUTPATIENT)
Dept: LAB | Facility: HOSPITAL | Age: 75
End: 2025-08-23
Payer: MEDICARE

## 2025-08-23 DIAGNOSIS — N18.32 CHRONIC KIDNEY DISEASE (CKD) STAGE G3B/A1, MODERATELY DECREASED GLOMERULAR FILTRATION RATE (GFR) BETWEEN 30-44 ML/MIN/1.73 SQUARE METER AND ALBUMINURIA CREATININE RATIO LESS THAN 30 MG/G (CMS/H*: ICD-10-CM

## 2025-08-23 DIAGNOSIS — N18.32 CHRONIC KIDNEY DISEASE (CKD) STAGE G3B/A1, MODERATELY DECREASED GLOMERULAR FILTRATION RATE (GFR) BETWEEN 30-44 ML/MIN/1.73 SQUARE METER AND ALBUMINURIA CREATININE RATIO LESS THAN 30 MG/G (CMS/H*: Primary | ICD-10-CM

## 2025-08-23 LAB
ALBUMIN SERPL-MCNC: 4.2 G/DL (ref 3.5–5.2)
ALBUMIN UR-MCNC: <1.2 MG/DL
ALBUMIN/GLOB SERPL: 1.4 G/DL
ALP SERPL-CCNC: 54 U/L (ref 39–117)
ALT SERPL W P-5'-P-CCNC: 16 U/L (ref 1–41)
ANION GAP SERPL CALCULATED.3IONS-SCNC: 13.8 MMOL/L (ref 5–15)
AST SERPL-CCNC: 22 U/L (ref 1–40)
BILIRUB SERPL-MCNC: 0.4 MG/DL (ref 0–1.2)
BILIRUB UR QL STRIP: NEGATIVE
BUN SERPL-MCNC: 21 MG/DL (ref 8–23)
BUN/CREAT SERPL: 16.7 (ref 7–25)
CALCIUM SPEC-SCNC: 9.5 MG/DL (ref 8.6–10.5)
CHLORIDE SERPL-SCNC: 105 MMOL/L (ref 98–107)
CLARITY UR: CLEAR
CO2 SERPL-SCNC: 21.2 MMOL/L (ref 22–29)
COLOR UR: YELLOW
CREAT SERPL-MCNC: 1.26 MG/DL (ref 0.76–1.27)
CREAT UR-MCNC: 41.6 MG/DL
EGFRCR SERPLBLD CKD-EPI 2021: 59.8 ML/MIN/1.73
GLOBULIN UR ELPH-MCNC: 3 GM/DL
GLUCOSE SERPL-MCNC: 168 MG/DL (ref 65–99)
GLUCOSE UR STRIP-MCNC: ABNORMAL MG/DL
HGB UR QL STRIP.AUTO: NEGATIVE
HOLD SPECIMEN: NORMAL
KETONES UR QL STRIP: NEGATIVE
LEUKOCYTE ESTERASE UR QL STRIP.AUTO: NEGATIVE
NITRITE UR QL STRIP: NEGATIVE
PH UR STRIP.AUTO: 6.5 [PH] (ref 5–8)
POTASSIUM SERPL-SCNC: 5.4 MMOL/L (ref 3.5–5.2)
PROT ?TM UR-MCNC: 4.8 MG/DL
PROT SERPL-MCNC: 7.2 G/DL (ref 6–8.5)
PROT UR QL STRIP: NEGATIVE
PROT/CREAT UR: 115.4 MG/G CREA (ref 0–200)
SODIUM SERPL-SCNC: 140 MMOL/L (ref 136–145)
SP GR UR STRIP: 1.03 (ref 1–1.03)
UROBILINOGEN UR QL STRIP: ABNORMAL

## 2025-08-23 PROCEDURE — 80053 COMPREHEN METABOLIC PANEL: CPT

## 2025-08-23 PROCEDURE — 36415 COLL VENOUS BLD VENIPUNCTURE: CPT

## 2025-08-23 PROCEDURE — 82570 ASSAY OF URINE CREATININE: CPT

## 2025-08-23 PROCEDURE — 84156 ASSAY OF PROTEIN URINE: CPT

## 2025-08-23 PROCEDURE — 82043 UR ALBUMIN QUANTITATIVE: CPT

## 2025-08-23 PROCEDURE — 81003 URINALYSIS AUTO W/O SCOPE: CPT

## (undated) DEVICE — NC TREK CORONARY DILATATION CATHETER 2.75 MM X 12 MM / RAPID-EXCHANGE: Brand: NC TREK

## (undated) DEVICE — GW J TP FIX CORE .035 150

## (undated) DEVICE — SHEATH INTRO SUPERSHEATH SHRT .035 4F 11CM

## (undated) DEVICE — ANGIO-SEAL VIP VASCULAR CLOSURE DEVICE: Brand: ANGIO-SEAL

## (undated) DEVICE — KT MANIFOLD CATHLAB CUST

## (undated) DEVICE — DEV INFL MONARCH 25W

## (undated) DEVICE — 6F .070 XB LAD 3.5 100CM: Brand: VISTA BRITE TIP

## (undated) DEVICE — ST EXT IV SMRTSTE 2VLV FIX M LL 6ML 41

## (undated) DEVICE — CATH F5 INF 3DRC 100CM: Brand: INFINITI

## (undated) DEVICE — ADULT DISPOSABLE SINGLE-PATIENT USE PULSE OXIMETER SENSOR: Brand: NONIN

## (undated) DEVICE — DRSNG SURESITE WNDW 4X4.5

## (undated) DEVICE — INTRO SHEATH ART/FEM ENGAGE .038 6F12CM

## (undated) DEVICE — ASMBL SPK CONTRST CONTRL

## (undated) DEVICE — PK CATH CARD 10

## (undated) DEVICE — PINNACLE INTRODUCER SHEATH: Brand: PINNACLE

## (undated) DEVICE — MANIFLD NAMIC PRECEPTOR INTEGR/TRANSD RT/HND 1/PRT 500PSI

## (undated) DEVICE — Device: Brand: ASAHI SION BLUE

## (undated) DEVICE — PK CATH CARD 70

## (undated) DEVICE — ST INF PRI SMRTSTE 20DRP 2VLV 24ML 117

## (undated) DEVICE — CATH DIAG EXPO M/ PK 6FR FL4/FR4 PIG 3PK

## (undated) DEVICE — PAD, DEFIB, ADULT, RADIOTRANS, ZOLL: Brand: MEDLINE

## (undated) DEVICE — CATH F5 INF JL 4 100CM: Brand: INFINITI

## (undated) DEVICE — 360 - 360I 10"/10" CMSQ 8RA: Brand: MEDLINE

## (undated) DEVICE — GW J/TP MOVE/CORE 0.035 3MM/TP 145CM

## (undated) DEVICE — CATH F5 INF PIG145 110CM 6SH: Brand: INFINITI

## (undated) DEVICE — TREK CORONARY DILATATION CATHETER 2.50 MM X 8 MM / RAPID-EXCHANGE: Brand: TREK

## (undated) DEVICE — LN FLTR ORL/NASL MICROSTREAM NONINTUB A/LNG